# Patient Record
Sex: FEMALE | Race: WHITE | NOT HISPANIC OR LATINO | URBAN - METROPOLITAN AREA
[De-identification: names, ages, dates, MRNs, and addresses within clinical notes are randomized per-mention and may not be internally consistent; named-entity substitution may affect disease eponyms.]

---

## 2022-10-07 ENCOUNTER — INPATIENT (INPATIENT)
Facility: HOSPITAL | Age: 77
LOS: 6 days | Discharge: HOME | DRG: 181 | End: 2022-10-14
Attending: INTERNAL MEDICINE | Admitting: INTERNAL MEDICINE
Payer: MEDICARE

## 2022-10-07 ENCOUNTER — RESULT REVIEW (OUTPATIENT)
Age: 77
End: 2022-10-07

## 2022-10-07 VITALS
TEMPERATURE: 98 F | HEIGHT: 63 IN | RESPIRATION RATE: 22 BRPM | OXYGEN SATURATION: 95 % | HEART RATE: 107 BPM | DIASTOLIC BLOOD PRESSURE: 84 MMHG | SYSTOLIC BLOOD PRESSURE: 162 MMHG | WEIGHT: 123.9 LBS

## 2022-10-07 DIAGNOSIS — J90 PLEURAL EFFUSION, NOT ELSEWHERE CLASSIFIED: ICD-10-CM

## 2022-10-07 LAB
ALBUMIN FLD-MCNC: 3 G/DL — SIGNIFICANT CHANGE UP
ALBUMIN SERPL ELPH-MCNC: 4.5 G/DL — SIGNIFICANT CHANGE UP (ref 3.3–5)
ALP SERPL-CCNC: 67 U/L — SIGNIFICANT CHANGE UP (ref 40–120)
ALT FLD-CCNC: 8 U/L — LOW (ref 10–45)
ANION GAP SERPL CALC-SCNC: 12 MMOL/L — SIGNIFICANT CHANGE UP (ref 5–17)
ANISOCYTOSIS BLD QL: SLIGHT — SIGNIFICANT CHANGE UP
APTT BLD: 31.2 SEC — SIGNIFICANT CHANGE UP (ref 27.5–35.5)
AST SERPL-CCNC: 11 U/L — SIGNIFICANT CHANGE UP (ref 10–40)
B PERT IGG+IGM PNL SER: ABNORMAL
BASE EXCESS BLDV CALC-SCNC: 1.5 MMOL/L — SIGNIFICANT CHANGE UP (ref -2–3)
BASOPHILS # BLD AUTO: 0 K/UL — SIGNIFICANT CHANGE UP (ref 0–0.2)
BASOPHILS NFR BLD AUTO: 0 % — SIGNIFICANT CHANGE UP (ref 0–2)
BILIRUB SERPL-MCNC: 0.7 MG/DL — SIGNIFICANT CHANGE UP (ref 0.2–1.2)
BUN SERPL-MCNC: 18 MG/DL — SIGNIFICANT CHANGE UP (ref 7–23)
BURR CELLS BLD QL SMEAR: SLIGHT — SIGNIFICANT CHANGE UP
CA-I SERPL-SCNC: 1.22 MMOL/L — SIGNIFICANT CHANGE UP (ref 1.15–1.33)
CALCIUM SERPL-MCNC: 9.6 MG/DL — SIGNIFICANT CHANGE UP (ref 8.4–10.5)
CHLORIDE BLDV-SCNC: 105 MMOL/L — SIGNIFICANT CHANGE UP (ref 96–108)
CHLORIDE SERPL-SCNC: 106 MMOL/L — SIGNIFICANT CHANGE UP (ref 96–108)
CO2 BLDV-SCNC: 29 MMOL/L — HIGH (ref 22–26)
CO2 SERPL-SCNC: 25 MMOL/L — SIGNIFICANT CHANGE UP (ref 22–31)
COLOR FLD: YELLOW — SIGNIFICANT CHANGE UP
CREAT SERPL-MCNC: 0.52 MG/DL — SIGNIFICANT CHANGE UP (ref 0.5–1.3)
DACRYOCYTES BLD QL SMEAR: SLIGHT — SIGNIFICANT CHANGE UP
EGFR: 95 ML/MIN/1.73M2 — SIGNIFICANT CHANGE UP
ELLIPTOCYTES BLD QL SMEAR: SLIGHT — SIGNIFICANT CHANGE UP
EOSINOPHIL # BLD AUTO: 0 K/UL — SIGNIFICANT CHANGE UP (ref 0–0.5)
EOSINOPHIL NFR BLD AUTO: 0 % — SIGNIFICANT CHANGE UP (ref 0–6)
FLUID INTAKE SUBSTANCE CLASS: SIGNIFICANT CHANGE UP
GAS PNL BLDV: 139 MMOL/L — SIGNIFICANT CHANGE UP (ref 136–145)
GAS PNL BLDV: SIGNIFICANT CHANGE UP
GAS PNL BLDV: SIGNIFICANT CHANGE UP
GLUCOSE BLDV-MCNC: 101 MG/DL — HIGH (ref 70–99)
GLUCOSE FLD-MCNC: 125 MG/DL — SIGNIFICANT CHANGE UP
GLUCOSE SERPL-MCNC: 104 MG/DL — HIGH (ref 70–99)
HCO3 BLDV-SCNC: 28 MMOL/L — SIGNIFICANT CHANGE UP (ref 22–29)
HCT VFR BLD CALC: 35.8 % — SIGNIFICANT CHANGE UP (ref 34.5–45)
HCT VFR BLDA CALC: 39 % — SIGNIFICANT CHANGE UP (ref 34.5–46.5)
HGB BLD CALC-MCNC: 13 G/DL — SIGNIFICANT CHANGE UP (ref 11.7–16.1)
HGB BLD-MCNC: 10.7 G/DL — LOW (ref 11.5–15.5)
HYPOCHROMIA BLD QL: SLIGHT — SIGNIFICANT CHANGE UP
INR BLD: 1.2 RATIO — HIGH (ref 0.88–1.16)
LACTATE BLDV-MCNC: 2.8 MMOL/L — HIGH (ref 0.5–2)
LDH SERPL L TO P-CCNC: 266 U/L — SIGNIFICANT CHANGE UP
LYMPHOCYTES # BLD AUTO: 1.11 K/UL — SIGNIFICANT CHANGE UP (ref 1–3.3)
LYMPHOCYTES # BLD AUTO: 13.9 % — SIGNIFICANT CHANGE UP (ref 13–44)
LYMPHOCYTES # FLD: 86 % — SIGNIFICANT CHANGE UP
MANUAL SMEAR VERIFICATION: SIGNIFICANT CHANGE UP
MCHC RBC-ENTMCNC: 18.1 PG — LOW (ref 27–34)
MCHC RBC-ENTMCNC: 29.9 GM/DL — LOW (ref 32–36)
MCV RBC AUTO: 60.6 FL — LOW (ref 80–100)
MICROCYTES BLD QL: SLIGHT — SIGNIFICANT CHANGE UP
MONOCYTES # BLD AUTO: 0.28 K/UL — SIGNIFICANT CHANGE UP (ref 0–0.9)
MONOCYTES NFR BLD AUTO: 3.5 % — SIGNIFICANT CHANGE UP (ref 2–14)
MONOS+MACROS # FLD: 12 % — SIGNIFICANT CHANGE UP
NEUTROPHILS # BLD AUTO: 6.51 K/UL — SIGNIFICANT CHANGE UP (ref 1.8–7.4)
NEUTROPHILS NFR BLD AUTO: 81.7 % — HIGH (ref 43–77)
NEUTROPHILS-BODY FLUID: 2 % — SIGNIFICANT CHANGE UP
PCO2 BLDV: 49 MMHG — HIGH (ref 39–42)
PH BLDV: 7.36 — SIGNIFICANT CHANGE UP (ref 7.32–7.43)
PH FLD: 7.69 — SIGNIFICANT CHANGE UP
PLAT MORPH BLD: NORMAL — SIGNIFICANT CHANGE UP
PLATELET # BLD AUTO: 195 K/UL — SIGNIFICANT CHANGE UP (ref 150–400)
PO2 BLDV: 37 MMHG — SIGNIFICANT CHANGE UP (ref 25–45)
POIKILOCYTOSIS BLD QL AUTO: SIGNIFICANT CHANGE UP
POLYCHROMASIA BLD QL SMEAR: SLIGHT — SIGNIFICANT CHANGE UP
POTASSIUM BLDV-SCNC: 3.7 MMOL/L — SIGNIFICANT CHANGE UP (ref 3.5–5.1)
POTASSIUM SERPL-MCNC: 3.9 MMOL/L — SIGNIFICANT CHANGE UP (ref 3.5–5.3)
POTASSIUM SERPL-SCNC: 3.9 MMOL/L — SIGNIFICANT CHANGE UP (ref 3.5–5.3)
PROT FLD-MCNC: 4.5 G/DL — SIGNIFICANT CHANGE UP
PROT SERPL-MCNC: 7.5 G/DL — SIGNIFICANT CHANGE UP (ref 6–8.3)
PROTHROM AB SERPL-ACNC: 14 SEC — HIGH (ref 10.5–13.4)
RBC # BLD: 5.91 M/UL — HIGH (ref 3.8–5.2)
RBC # FLD: 17.4 % — HIGH (ref 10.3–14.5)
RBC BLD AUTO: ABNORMAL
RCV VOL RI: 4000 /UL — HIGH (ref 0–0)
SAO2 % BLDV: 59.2 % — LOW (ref 67–88)
SCHISTOCYTES BLD QL AUTO: SLIGHT — SIGNIFICANT CHANGE UP
SODIUM SERPL-SCNC: 143 MMOL/L — SIGNIFICANT CHANGE UP (ref 135–145)
TARGETS BLD QL SMEAR: SLIGHT — SIGNIFICANT CHANGE UP
TOTAL NUCLEATED CELL COUNT, BODY FLUID: 1156 /UL — SIGNIFICANT CHANGE UP
TUBE TYPE: SIGNIFICANT CHANGE UP
VARIANT LYMPHS # BLD: 0.9 % — SIGNIFICANT CHANGE UP (ref 0–6)
WBC # BLD: 7.97 K/UL — SIGNIFICANT CHANGE UP (ref 3.8–10.5)
WBC # FLD AUTO: 7.97 K/UL — SIGNIFICANT CHANGE UP (ref 3.8–10.5)

## 2022-10-07 PROCEDURE — 88305 TISSUE EXAM BY PATHOLOGIST: CPT | Mod: 26

## 2022-10-07 PROCEDURE — 71260 CT THORAX DX C+: CPT | Mod: 26

## 2022-10-07 PROCEDURE — 88112 CYTOPATH CELL ENHANCE TECH: CPT | Mod: 26

## 2022-10-07 PROCEDURE — 99222 1ST HOSP IP/OBS MODERATE 55: CPT

## 2022-10-07 PROCEDURE — 99285 EMERGENCY DEPT VISIT HI MDM: CPT

## 2022-10-07 PROCEDURE — 71046 X-RAY EXAM CHEST 2 VIEWS: CPT | Mod: 26

## 2022-10-07 PROCEDURE — 71045 X-RAY EXAM CHEST 1 VIEW: CPT | Mod: 26,59

## 2022-10-07 PROCEDURE — 74177 CT ABD & PELVIS W/CONTRAST: CPT | Mod: 26

## 2022-10-07 PROCEDURE — 93010 ELECTROCARDIOGRAM REPORT: CPT

## 2022-10-07 RX ORDER — HYDROMORPHONE HYDROCHLORIDE 2 MG/ML
0.5 INJECTION INTRAMUSCULAR; INTRAVENOUS; SUBCUTANEOUS EVERY 6 HOURS
Refills: 0 | Status: DISCONTINUED | OUTPATIENT
Start: 2022-10-07 | End: 2022-10-09

## 2022-10-07 RX ORDER — SENNA PLUS 8.6 MG/1
2 TABLET ORAL AT BEDTIME
Refills: 0 | Status: DISCONTINUED | OUTPATIENT
Start: 2022-10-07 | End: 2022-10-14

## 2022-10-07 RX ORDER — HEPARIN SODIUM 5000 [USP'U]/ML
5000 INJECTION INTRAVENOUS; SUBCUTANEOUS EVERY 12 HOURS
Refills: 0 | Status: DISCONTINUED | OUTPATIENT
Start: 2022-10-07 | End: 2022-10-12

## 2022-10-07 RX ADMIN — HYDROMORPHONE HYDROCHLORIDE 0.5 MILLIGRAM(S): 2 INJECTION INTRAMUSCULAR; INTRAVENOUS; SUBCUTANEOUS at 21:36

## 2022-10-07 RX ADMIN — HYDROMORPHONE HYDROCHLORIDE 0.5 MILLIGRAM(S): 2 INJECTION INTRAMUSCULAR; INTRAVENOUS; SUBCUTANEOUS at 21:45

## 2022-10-07 NOTE — ED PROVIDER NOTE - CARE PLAN
1 Principal Discharge DX:	Pleural effusion, left   Principal Discharge DX:	Pleural effusion, left  Secondary Diagnosis:	Hydropneumothorax

## 2022-10-07 NOTE — CONSULT NOTE ADULT - SUBJECTIVE AND OBJECTIVE BOX
CHIEF COMPLAINT:Patient is a 77y old  Female who presents with a chief complaint of sob (07 Oct 2022 19:24)      HPI:  77 F no significant PMH presenting to ED with 1 month of SOB and cough. cough was productive now dry. Pt saw PCP 1 week ago for sx and had a CXR ordered which showed increased haziness throughout most of the left mid and lower thorax with small areas of residual aeration upper thorax, clear right lung. Pt has appointment with pulmonologist in 3 weeks but did not want to wait that long so came to ED today. Denies fever, HA, CP, abdominal pain, dysuria, N/V/D. Has not taken any medications for sx.      PAST MEDICAL & SURGICAL HISTORY:  No pertinent past medical history      No significant past surgical history          MEDICATIONS  (STANDING):  heparin   Injectable 5000 Unit(s) SubCutaneous every 12 hours  senna 2 Tablet(s) Oral at bedtime    MEDICATIONS  (PRN):  HYDROmorphone  Injectable 0.5 milliGRAM(s) IV Push every 6 hours PRN Moderate Pain (4 - 6)      FAMILY HISTORY:      SOCIAL HISTORY:    [x ] Non-smoker  [ ] Smoker  [ ] Alcohol    Allergies    No Known Allergies    Intolerances    	    REVIEW OF SYSTEMS:  CONSTITUTIONAL: No fever, weight loss, or fatigue  EYES: No eye pain, visual disturbances, or discharge  ENT:  No difficulty hearing, tinnitus, vertigo; No sinus or throat pain  NECK: No pain or stiffness  RESPIRATORY: No cough, wheezing, chills or hemoptysis; + Shortness of Breath  CARDIOVASCULAR: No chest pain, palpitations, passing out, dizziness, or leg swelling  GASTROINTESTINAL: No abdominal or epigastric pain. No nausea, vomiting, or hematemesis; No diarrhea or constipation. No melena or hematochezia.  GENITOURINARY: No dysuria, frequency, hematuria, or incontinence  NEUROLOGICAL: No headaches, memory loss, loss of strength, numbness, or tremors  SKIN: No itching, burning, rashes, or lesions   LYMPH Nodes: No enlarged glands  ENDOCRINE: No heat or cold intolerance; No hair loss  MUSCULOSKELETAL: No joint pain or swelling; No muscle, back, or extremity pain  PSYCHIATRIC: No depression, anxiety, mood swings, or difficulty sleeping  HEME/LYMPH: No easy bruising, or bleeding gums  ALLERGY AND IMMUNOLOGIC: No hives or eczema	    [ ] All others negative	  [ x] Unable to obtain    PHYSICAL EXAM:  T(C): 36.6 (10-07-22 @ 17:07), Max: 36.7 (10-07-22 @ 10:18)  HR: 88 (10-07-22 @ 17:07) (88 - 107)  BP: 152/76 (10-07-22 @ 17:07) (152/76 - 171/82)  RR: 22 (10-07-22 @ 17:07) (22 - 22)  SpO2: 96% (10-07-22 @ 17:07) (95% - 96%)  Wt(kg): --  I&O's Summary      Appearance: Normal	  HEENT:   Normal oral mucosa, PERRL, EOMI	  Lymphatic: No lymphadenopathy  Cardiovascular: Normal S1 S2, No JVD, + murmurs, No edema  Respiratory: decrease bs R side  Gastrointestinal:  Soft, Non-tender, + BS	  Skin: No rashes, No ecchymoses, No cyanosis	  Neurologic: Non-focal  Extremities: Normal range of motion, No clubbing, cyanosis or edema  Vascular: Peripheral pulses palpable 2+ bilaterally    TELEMETRY: 	    ECG:  	  RADIOLOGY:  OTHER: 	  	  LABS:	 	    CARDIAC MARKERS:                              10.7   7.97  )-----------( 195      ( 07 Oct 2022 14:12 )             35.8     10-07    143  |  106  |  18  ----------------------------<  104<H>  3.9   |  25  |  0.52    Ca    9.6      07 Oct 2022 14:12    TPro  7.5  /  Alb  4.5  /  TBili  0.7  /  DBili  x   /  AST  11  /  ALT  8<L>  /  AlkPhos  67  10-07    proBNP: Serum Pro-Brain Natriuretic Peptide: 194 pg/mL (10-07 @ 14:12)    Lipid Profile:   HgA1c:   TSH:   PT/INR - ( 07 Oct 2022 14:12 )   PT: 14.0 sec;   INR: 1.20 ratio         PTT - ( 07 Oct 2022 14:12 )  PTT:31.2 sec    PREVIOUS DIAGNOSTIC TESTING:    < from: CT Chest w/ IV Cont (10.07.22 @ 16:41) >  Large left hilar and left upper lobe tumor as described with confluent   multistation mediastinal involvement versus lymphadenopathy and moderate   to large loculated left pleural effusion is highly suspicious for small   cell lung carcinoma. Recommend biopsy to confirm diagnosis    Small pericardial effusion.    Heterogeneous mid to distal pancreatic body 4.7 cm mass, presumably   metastatic    Plan/Recommendations:  - Will place L pigtail catheter at bedside  - Will send cytology and cultures from pleural fluid

## 2022-10-07 NOTE — ED PROVIDER NOTE - OBJECTIVE STATEMENT
77 F no significant PMH presenting to ED with 1 month of SOB and cough. cough was productive now dry. Pt saw PCP 1 week ago for sx and had a CXR ordered which showed increased haziness throughout most of the left mid and lower thorax with small areas of residual aeration upper thorax, clear right lung. Pt has appointment with pulmonologist in 3 weeks but did not want to wait that long so came to ED today. Denies fever, HA, CP, abdominal pain, dysuria, N/V/D. Has not taken any medications for sx.

## 2022-10-07 NOTE — CONSULT NOTE ADULT - SUBJECTIVE AND OBJECTIVE BOX
THORACIC SURGERY CONSULT  ========================    HPI:  77F with h/o appendectomy, ex-smoker presenting with cough x1 month. Patient reports a dry cough for one month, as well as some discomfort in her throat. Denies fevers, chill, night sweats. Denies SOB. Unsure about weight loss. Endorses poor appetite. Went to her PCP a week ago who did a CXR and saw a L pleural effusion. Given patient's cough has persisted, she presented to ED for evaluation. Reports she used to smoke about half a pack per day x 20 years but quit a long time ago.    PAST MEDICAL & SURGICAL HISTORY:  No pertinent past medical history    Appendectomy      MEDICATIONS:  None    ALLERGIES:  NKDA  ___________________________________________  REVIEW OF SYSTEMS:  All ROS negative except as per HPI.    ___________________________________________  VITALS:  Vital Signs Last 24 Hrs  T(C): 36.4 (07 Oct 2022 13:02), Max: 36.7 (07 Oct 2022 10:18)  T(F): 97.6 (07 Oct 2022 13:02), Max: 98.1 (07 Oct 2022 10:18)  HR: 92 (07 Oct 2022 13:02) (92 - 107)  BP: 171/82 (07 Oct 2022 13:02) (162/84 - 171/82)  BP(mean): --  RR: 22 (07 Oct 2022 13:02) (22 - 22)  SpO2: 96% (07 Oct 2022 13:02) (95% - 96%)    Parameters below as of 07 Oct 2022 13:02  Patient On (Oxygen Delivery Method): room air      PHYSICAL EXAM:  General: AAOx3, no acute distress.  Respiratory: breathing comfortably, no increased WOB, on room air  Lungs: no chest wall tenderness   Abdomen: soft, nontender, nondistended, no rebound, no guarding  Extremities: Moves all four.   Lymph: no cervical or supraclavicular lymphadenopathy  ____________________________________________  LABS:  CBC Full  -  ( 07 Oct 2022 14:12 )  WBC Count : 7.97 K/uL  RBC Count : 5.91 M/uL  Hemoglobin : 10.7 g/dL  Hematocrit : 35.8 %  Platelet Count - Automated : 195 K/uL  Mean Cell Volume : 60.6 fl  Mean Cell Hemoglobin : 18.1 pg  Mean Cell Hemoglobin Concentration : 29.9 gm/dL  Auto Neutrophil # : 6.51 K/uL  Auto Lymphocyte # : 1.11 K/uL  Auto Monocyte # : 0.28 K/uL  Auto Eosinophil # : 0.00 K/uL  Auto Basophil # : 0.00 K/uL  Auto Neutrophil % : 81.7 %  Auto Lymphocyte % : 13.9 %  Auto Monocyte % : 3.5 %  Auto Eosinophil % : 0.0 %  Auto Basophil % : 0.0 %    10-07    143  |  106  |  18  ----------------------------<  104<H>  3.9   |  25  |  0.52    Ca    9.6      07 Oct 2022 14:12    TPro  7.5  /  Alb  4.5  /  TBili  0.7  /  DBili  x   /  AST  11  /  ALT  8<L>  /  AlkPhos  67  10-07    LIVER FUNCTIONS - ( 07 Oct 2022 14:12 )  Alb: 4.5 g/dL / Pro: 7.5 g/dL / ALK PHOS: 67 U/L / ALT: 8 U/L / AST: 11 U/L / GGT: x           PT/INR - ( 07 Oct 2022 14:12 )   PT: 14.0 sec;   INR: 1.20 ratio      PTT - ( 07 Oct 2022 14:12 )  PTT:31.2 sec    ____________________________________________  RADIOLOGY & ADDITIONAL STUDIES:  < from: CT Chest w/ IV Cont (10.07.22 @ 16:41) >  ACC: [id number] EXAM:  CT CHEST IC                        ACC: [id number] EXAM:  CT ABDOMEN AND PELVIS IC                          PROCEDURE DATE:  10/07/2022      INTERPRETATION:  CLINICAL INFORMATION: Left pleural effusion. Oncologic   workup    COMPARISON: No prior examinations are available for comparison at the   time of this interpretation.    CONTRAST/COMPLICATIONS:  IV Contrast: Omnipaque 350  90 cc administered  Oral Contrast: NONE  Complications: None reported at time of study completion    PROCEDURE:  CT of the Chest, Abdomen and Pelvis was performed.  Sagittal and coronal reformats were performed.    FINDINGS: Some images are degraded by artifacts from the patient's arms   within the scanning field of view. Motion artifacts degrade some of the   imaging.    CHEST:  LUNGS AND LARGE AIRWAYS: Air-fluid level within left mainstem bronchus.  Complete opacification of the left upper lobe and lingular bronchi.  Large approximately 9 cm left hilar tumor extends into the left upper   lobe and is also confluent with the left mediastinum, AP window,   precarinal and subcarinal catrachita bulky lymphadenopathy versus direct   extension of tumor.  Tumor encases and narrows the left pulmonary arteries with encasement of   the left mainstembronchus and is inseparable from the aortic arch   through middescending aorta as well as the lower esophagus.  Postobstructive lingular atelectasis  Mild right basilar subsegmental atelectasis. Calcified granulomata.  PLEURA: Moderate to large loculated left pleural effusion with adjacent   atelectasis. Tumor abuts pleura anteriorly  VESSELS: Atherosclerotic changes of the aorta and coronary arteries.   Dilated ascending aorta measures 3.8 cm. Tumor vascular encasement as   described above  HEART: Heart size is normal. Small pericardial effusion  MEDIASTINUM AND ADE: Bulky multistation mediastinal lymphadenopathy   confluent with the left hilar and left upper lobe tumor. Additional   enlarged 1.7 x 1.2 cm lymph node interposed between the left common   carotid and left subclavian arteries.  CHEST WALL AND LOWER NECK: No significant acute abnormality.    ABDOMEN AND PELVIS:  LIVER: Within normal limits.  BILE DUCTS: Normal caliber.  GALLBLADDER: Nondistended  SPLEEN: Top normal splenic length  PANCREAS: Mid to distal body heterogeneous approximately 4.7 cm mass  ADRENALS: Within normal limits.  KIDNEYS/URETERS: Symmetric renal enhancement without hydronephrosis.   Subcentimeter right upper pole hypodensity too small for definitive   characterization    BLADDER: Minimally distended.  REPRODUCTIVE ORGANS: Atrophic    BOWEL: No bowel obstruction. Appendix is not visualized. No evidence of   inflammation in the pericecal region. Mild colonic diverticulosis  PERITONEUM: No ascites.  VESSELS: Atherosclerotic changes.  RETROPERITONEUM/LYMPH NODES: No lymphadenopathy.  ABDOMINAL WALL: Tiny fat-containing umbilical hernia.  BONES: Multilevel degenerative changes throughout the spine including   age-indeterminate mild L5 compression deformity.    IMPRESSION:    Large left hilar and left upper lobe tumor as described with confluent   multistation mediastinal involvement versus lymphadenopathy and moderate   to large loculated left pleural effusion is highly suspicious for small   cell lung carcinoma. Recommend biopsy to confirm diagnosis    Small pericardial effusion.    Heterogeneous mid to distal pancreatic body 4.7 cm mass, presumably   metastatic    < end of copied text >

## 2022-10-07 NOTE — ED PROVIDER NOTE - NS ED ROS FT
Gen: Denies fevers  CV: Denies chest pain  Skin: denies color changes  Resp: +cough, SOB  Endo: Denies increased urination  GI: Denies nausea, vomiting  Msk: Denies extremity pain  : Denies dysuria  Neuro: Denies LOC  Psych: Denies mood changes  all other ROS negative unless indicated in HPI

## 2022-10-07 NOTE — ED PROVIDER NOTE - CLINICAL SUMMARY MEDICAL DECISION MAKING FREE TEXT BOX
77 F no significant PMH presenting to ED with 1 month of SOB and cough. cough was productive now dry. Pt saw PCP 1 week ago for sx and had a CXR ordered which showed increased haziness throughout most of the left mid and lower thorax with small areas of residual aeration upper thorax, clear right lung. Decreased breath sounds Lt lung on exam. Vitals stable. Sx likely related to pleural effusion. Will repeat CXR, get labs, coags, EKG. likely need drain. Will reassess.

## 2022-10-07 NOTE — H&P ADULT - NSHPREVIEWOFSYSTEMS_GEN_ALL_CORE
REVIEW OF SYSTEMS:  GEN: no fever,    no chills  RESP: SOB, /cough  CVS: no chest pain,   no palpitations  GI: no abdominal pain,   no nausea,   no vomiting,   no constipation,   no diarrhea  : no dysuria,   no frequency  NEURO: no headache,   no dizziness  PSYCH: no depression,   not anxious  Derm : no rash

## 2022-10-07 NOTE — H&P ADULT - HISTORY OF PRESENT ILLNESS
77 F     no significant PMH     presenting to ED with 1 month of SOB and cough.     Pt saw PCP 1 week ago for sx and had a CXR ordered which showed increased haziness throughout most of the left mid and lower thorax with small areas of residual aeration upper thorax, clear right lung. Pt has appointment with pulmonologist in 3 weeks but did not want to wait that long so came to ED today.     Denies fever, cp/ , abdominal pain, dysuria, N/V/D.

## 2022-10-07 NOTE — PROCEDURE NOTE - ADDITIONAL PROCEDURE DETAILS
1000ml serous pleural fluid obtained, chest tube clamped 1000ml serous pleural fluid obtained, chest tube clamped  Cytology and fluid studies sent

## 2022-10-07 NOTE — H&P ADULT - ASSESSMENT
77F     with h/o appendectomy, ex-smoker      presenting with cough x1 month   and mild  sob        found to have a large ELYSE and hilar lung tumor with associated large left pleural effusion.    ct c/a/p, pending   seen by thoracic team    L pigtail catheter at bedside  HTN,  pn toprol,  card/  echo    dvt  ppx   - 77F     with h/o appendectomy, ex-smoker      presenting with cough x1 month   and mild  sob        found to have a large ELYSE and hilar lung tumor with associated large left pleural effusion.    ct c/a/p,  left hilar/ lung mas. .  L  pl  effusion,  mediastinal l adenopathy,  pancreatic  mass small pericardial  effusion,    suspect metastatic lung cancer.  oncology d r ohri   seen by thoracic team    L pigtail catheter at bedside  HTN,  pn toprol,  card/  echo    dvt  ppx   -     ra< from: CT Abdomen and Pelvis w/ IV Cont (10.07.22 @ 16:41) >  IMPRESSION:  Large left hilar and left upper lobe tumor as described with confluent   multistation mediastinal involvement versus lymphadenopathy and moderate   to large loculated left pleural effusion is highly suspicious for small   cell lung carcinoma. Recommend biopsy to confirm diagnosis  Small pericardial effusion.  Heterogeneous mid to distal pancreatic body 4.7 cm mass, presumably   metastati  --- End of Report ---

## 2022-10-07 NOTE — H&P ADULT - NSHPPHYSICALEXAM_GEN_ALL_CORE
PHYSICAL EXAMINATION:  Vital Signs Last 24 Hrs  T(C): 36.4 (07 Oct 2022 13:02), Max: 36.7 (07 Oct 2022 10:18)  T(F): 97.6 (07 Oct 2022 13:02), Max: 98.1 (07 Oct 2022 10:18)  HR: 92 (07 Oct 2022 13:02) (92 - 107)  BP: 171/82 (07 Oct 2022 13:02) (162/84 - 171/82)  BP(mean): --  RR: 22 (07 Oct 2022 13:02) (22 - 22)  SpO2: 96% (07 Oct 2022 13:02) (95% - 96%)    Parameters below as of 07 Oct 2022 13:02  Patient On (Oxygen Delivery Method): room air      CAPILLARY BLOOD GLUCOSE            GENERAL: NAD, well-groomed,  HEAD:  atraumatic, normocephalic  EYES: sclera anicteric  ENMT: mucous membranes moist  NECK: supple, No JVD  CHEST/LUNG:   absent b.soinfs/  base  HEART: normal S1, S2  ABDOMEN: BS+, soft, ND, NT   EXTREMITIES:    no    edema    b/l LEs  NEURO: awake, ,     moves all extremities  SKIN: no     rash

## 2022-10-07 NOTE — ED PROVIDER NOTE - PROGRESS NOTE DETAILS
Leoncio Joseph MD, PGY1  Radiology recommending the CT chest be done with IV contrast as well. Leoncio Joseph MD, PGY1  Xray showing large left sided pleural effusion. Plan to admit to medicine for IR drain. Endorsed to Dr. Melissa Martel. She recommended getting a CT chest and CT abdomen and pelvis to search for source. Discussed with patient. They are in agreement with plan. Answered all questions. Will admit. Leoncio Joseph MD, PGY1  Radiology called, Xray showing hydropneumothorax in addition to Lt pleural effusion. Will call surgery and IR. Leoncio Joseph MD, PGY1  Respoke with surgery, they will see patient. Discussed with admitting Dr. Melissa Martel and updated her on the patient. Pt sating well, no distress, no SOB.

## 2022-10-07 NOTE — H&P ADULT - NSHPLABSRESULTS_GEN_ALL_CORE
LABS:                        10.7   7.97  )-----------( 195      ( 07 Oct 2022 14:12 )             35.8     10-07    143  |  106  |  18  ----------------------------<  104<H>  3.9   |  25  |  0.52    Ca    9.6      07 Oct 2022 14:12    TPro  7.5  /  Alb  4.5  /  TBili  0.7  /  DBili  x   /  AST  11  /  ALT  8<L>  /  AlkPhos  67  10-07    PT/INR - ( 07 Oct 2022 14:12 )   PT: 14.0 sec;   INR: 1.20 ratio         PTT - ( 07 Oct 2022 14:12 )  PTT:31.2 sec            10-07 @ 14:12  3.7  37

## 2022-10-07 NOTE — ED PROVIDER NOTE - CHIEF COMPLAINT
----- Message from Letty Muñoz PA-C sent at 8/8/2022  4:10 PM CDT -----  He did have a short run of V-tach as well as a short run of SVT.  No evidence of atrial fibrillation.  He should follow-up with Dr. Norton, his cardiologist.  
Left message for patient to call back. 8/08/22  
Left message for patient to return our call 08/10/2022.  
Patients wife notified as below. She expressed understanding and agrees.   
The patient is a 77y Female complaining of shortness of breath.

## 2022-10-07 NOTE — ED ADULT NURSE NOTE - OBJECTIVE STATEMENT
Received 77 yr old female patient A&Ox4, complaining of shortness of breath. Patient has even respirations, o2 sat 96%. Patient states that a radiology report showed fluid on her left lung. Patient was made comfortable to area, 20g placed in right A/C. RN will follow up with orders. No chest pain, no nausea, no vomiting, no fever, no chills. Will continue to monitor.

## 2022-10-07 NOTE — ED PROVIDER NOTE - ATTENDING CONTRIBUTION TO CARE
77 F no significant PMH presenting to ED with 1 month of SOB and cough. cough was productive now dry with sandoval for the past month now, outpt cxr with pleural effusion noted, no cp, fevers, orthopnea or pnd, cxr, labs, ekg, sats on ra fine, possible dc but pending results. 77 F no significant PMH presenting to ED with 1 month of SOB and cough. cough was productive now dry with sandoval for the past month now, outpt cxr with pleural effusion noted, no cp, fevers, orthopnea or pnd, cxr, labs, ekg, sats on ra fine but a new finding to r/o cause, possible admission for pigtail CT likely.

## 2022-10-07 NOTE — ED PROVIDER NOTE - PHYSICAL EXAMINATION
Gen: NAD  HEENT: EOMI, no nasal discharge, mucous membranes moist  CV: RRR, +S1/S2, no M/R/G, 2+ radial pulses b/l  Resp: Decreased breath sounds Lt lower and mid lung. Rt lung CTA.   GI: Abdomen soft non-distended, NTTP  MSK: No open wounds, no LE edema  Neuro: A&Ox4, following commands, moving all four extremities spontaneously  Psych: appropriate mood

## 2022-10-08 DIAGNOSIS — J90 PLEURAL EFFUSION, NOT ELSEWHERE CLASSIFIED: ICD-10-CM

## 2022-10-08 LAB
ANION GAP SERPL CALC-SCNC: 11 MMOL/L — SIGNIFICANT CHANGE UP (ref 5–17)
BUN SERPL-MCNC: 14 MG/DL — SIGNIFICANT CHANGE UP (ref 7–23)
CALCIUM SERPL-MCNC: 9.5 MG/DL — SIGNIFICANT CHANGE UP (ref 8.4–10.5)
CHLORIDE SERPL-SCNC: 101 MMOL/L — SIGNIFICANT CHANGE UP (ref 96–108)
CO2 SERPL-SCNC: 26 MMOL/L — SIGNIFICANT CHANGE UP (ref 22–31)
CREAT SERPL-MCNC: 0.45 MG/DL — LOW (ref 0.5–1.3)
EGFR: 98 ML/MIN/1.73M2 — SIGNIFICANT CHANGE UP
GLUCOSE SERPL-MCNC: 119 MG/DL — HIGH (ref 70–99)
GRAM STN FLD: SIGNIFICANT CHANGE UP
HCT VFR BLD CALC: 35.7 % — SIGNIFICANT CHANGE UP (ref 34.5–45)
HGB BLD-MCNC: 11 G/DL — LOW (ref 11.5–15.5)
MCHC RBC-ENTMCNC: 18.3 PG — LOW (ref 27–34)
MCHC RBC-ENTMCNC: 30.8 GM/DL — LOW (ref 32–36)
MCV RBC AUTO: 59.4 FL — LOW (ref 80–100)
NRBC # BLD: 0 /100 WBCS — SIGNIFICANT CHANGE UP (ref 0–0)
PLATELET # BLD AUTO: 194 K/UL — SIGNIFICANT CHANGE UP (ref 150–400)
POTASSIUM SERPL-MCNC: 3.9 MMOL/L — SIGNIFICANT CHANGE UP (ref 3.5–5.3)
POTASSIUM SERPL-SCNC: 3.9 MMOL/L — SIGNIFICANT CHANGE UP (ref 3.5–5.3)
RBC # BLD: 6.01 M/UL — HIGH (ref 3.8–5.2)
RBC # FLD: 17.2 % — HIGH (ref 10.3–14.5)
SARS-COV-2 RNA SPEC QL NAA+PROBE: SIGNIFICANT CHANGE UP
SODIUM SERPL-SCNC: 138 MMOL/L — SIGNIFICANT CHANGE UP (ref 135–145)
SPECIMEN SOURCE: SIGNIFICANT CHANGE UP
WBC # BLD: 8.84 K/UL — SIGNIFICANT CHANGE UP (ref 3.8–10.5)
WBC # FLD AUTO: 8.84 K/UL — SIGNIFICANT CHANGE UP (ref 3.8–10.5)

## 2022-10-08 PROCEDURE — 71045 X-RAY EXAM CHEST 1 VIEW: CPT | Mod: 26

## 2022-10-08 PROCEDURE — 99231 SBSQ HOSP IP/OBS SF/LOW 25: CPT

## 2022-10-08 PROCEDURE — 99232 SBSQ HOSP IP/OBS MODERATE 35: CPT | Mod: 57

## 2022-10-08 RX ORDER — METOPROLOL TARTRATE 50 MG
25 TABLET ORAL DAILY
Refills: 0 | Status: DISCONTINUED | OUTPATIENT
Start: 2022-10-08 | End: 2022-10-14

## 2022-10-08 RX ORDER — AMLODIPINE BESYLATE 2.5 MG/1
5 TABLET ORAL DAILY
Refills: 0 | Status: DISCONTINUED | OUTPATIENT
Start: 2022-10-08 | End: 2022-10-14

## 2022-10-08 RX ADMIN — Medication 25 MILLIGRAM(S): at 09:56

## 2022-10-08 RX ADMIN — SENNA PLUS 2 TABLET(S): 8.6 TABLET ORAL at 21:47

## 2022-10-08 RX ADMIN — HEPARIN SODIUM 5000 UNIT(S): 5000 INJECTION INTRAVENOUS; SUBCUTANEOUS at 05:53

## 2022-10-08 RX ADMIN — HEPARIN SODIUM 5000 UNIT(S): 5000 INJECTION INTRAVENOUS; SUBCUTANEOUS at 17:44

## 2022-10-08 NOTE — PROGRESS NOTE ADULT - ASSESSMENT
77F     with h/o appendectomy, ex-smoker      presenting with cough x1 month   and mild  sob        found to have a large ELYSE and hilar lung tumor with associated large left pleural effusion.    ct c/a/p,  left hilar/ lung mas.   L  pl  effusion,  mediastinal l adenopathy,  pancreatic  mass small pericardial  effusion,    suspect metastatic lung cancer.  oncology d r ohri     thoracic team  and Left chest tube  on 10/ 7,  follow  cytology  HTN, on toprol     dvt  ppx        ra< from: CT Abdomen and Pelvis w/ IV Cont (10.07.22 @ 16:41) >  IMPRESSION:  Large left hilar and left upper lobe tumor as described with confluent   multistation mediastinal involvement versus lymphadenopathy and moderate   to large loculated left pleural effusion is highly suspicious for small   cell lung carcinoma. Recommend biopsy to confirm diagnosis  Small pericardial effusion.  Heterogeneous mid to distal pancreatic body 4.7 cm mass, presumably   metastati  --- End of Report ---

## 2022-10-08 NOTE — PATIENT PROFILE ADULT - FALL HARM RISK - HARM RISK INTERVENTIONS

## 2022-10-08 NOTE — PROGRESS NOTE ADULT - PROBLEM SELECTOR PLAN 1
keep Lt PTC   Pleurvac  lws   follow up cyto and pl cx  OOB to chair   \  recommend MRI head to r/o mass

## 2022-10-08 NOTE — PATIENT PROFILE ADULT - FALL HARM RISK - FACTORS NURSING JUDGEMENT
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  She was seen in the clinic yesterday with complaints of abdominal pain poor appetite poor oral intake and was clearly volume depleted.  There were no distinct masses that were palpated in the abdominal cavity.  And is a consequence of pain and dehydration she was admitted.  Over the course of the evening she was started on IV fluids.  She continues to complain of abdominal pain today.  2019 continues to complain of abdominal pain the Ca1 25 is trivially elevated and certainly not in keeping with an epithelial ovarian malignancy but it is the sem[place].  Agree with surgical approach on Tuesday.  We will obtain a cardiology consult for clearance.  2019: Patient feeling well this AM.  2019: Patient had congestion this morning.  Feeling that she is wheezing a bit.  Sydnee 10, 2019: Feels okay.  Expressing some anxiety about surgery tomorrow.  No S OB or BAUER.  2019: Somewhat apprehensive about surgery today has a cough that is nonproductive.  2019: Status post ISMAEL/BSO and resection of a rectus mass plan.  Clinically lymphoma and not an epithelial ovarian malignancy.  Lethargic but arousable.  Answers questions appropriately..       Physical Examination   VS/Measurements     Vitals  between:   11-JUN-2019 09:26:15   TO   12-JUN-2019 09:26:15                   LAST RESULT MINIMUM MAXIMUM  Temperature 35.9 35.2 36.1  Heart Rate 85 77 86  Respiratory Rate 16 14 19  NISBP           126 126 160  NIDBP           64 64 86  NIMBP           85 85 101  A Line Systolic 176 167 176  A Line Diastolic 95 92 95  A Line Mean 122 117 122  SpO2                    99 95 100    Intake and Output   I&O 24 hr   I & O between:  11-JUN-2019 09:26 TO 12-JUN-2019 09:26  Med Dosing Weight:  81.2  kg   05-JUN-2019  24 Hour Intake:   3558.83  ( 43.83 mL/kg )  24 Hour Output:   2250.00           24 Hour Urine/Stool Output:   0.0  24 Hour Balance:   1308.83           24 Hour Urine Output:   1750.00  ( 0.90 mL/kg/hr )    General:  Alert and oriented.    HENT:  Oral mucosa is moist.    Neck:  Supple, No lymphadenopathy.    Respiratory:  Rales in both bases.    Cardiovascular:  No murmur, No gallop.    Gastrointestinal:  Soft, Non-tender, Normal bowel sounds.    Lymphatics:  No lymphadenopathy neck, axilla, groin.    Musculoskeletal:  Normal range of motion.    Integumentary:  Warm.    Neurologic:  Alert.    Cognition and Speech:  Oriented.      Review / Management   Laboratory results:     Labs between:  11-JUN-2019 09:26 to 12-JUN-2019 09:26  CBC:                 WBC  HgB  Hct  Plt  MCV  RDW   12-JUN-2019 (H) 12.8  (L) 10.5  (L) 33.3  150  98.2  14.4   DIFF:                 Seg  Neutroph//ABS  Lymph//ABS  Mono//ABS  EOS/ABS  12-JUN-2019 NOT APPLICABLE  89 // (H) 11.3  3 // (L) 0.4  7 // 0.9 0 // (L) 0.0  BMP:                 Na  Cl  BUN  Glu   12-JUN-2019 143  (H) 108  17  (H) 105                              K  CO2  Cr  Ca                              4.5  24  (H) 1.14  9.0   CMP:                 AST  ALT  AlkPhos  Bili  Albumin   12-JUN-2019 (H) 43  18  (H) 139  0.3  (L) 2.8   Other Chem:             Mg  Phos  Triglycerides  GGTP  DirectBili                           1.9  (H) 6.2                        .    Documentation  reviewed     Impression and Plan   Dx and Plan:  Diagnosis     Acute kidney injury POA  Mild volume overload  Right ovarian mass possibly marginal zone lymphoma versus an epithelial ovarian malignancy  Marginal zone lymphoma in relapse  History of DCIS  Strong family history of breast cancer but she is not a BRCA1 or 2 carrier.  However investigation for check2 and PAL B2 have not been performed  Agammaglobulinemia as a consequence of Rituxan  Plan await final pathology.     .   Yes

## 2022-10-08 NOTE — PATIENT PROFILE ADULT - DOMESTIC TRAVEL HIGH RISK QUESTION
No Intermediate Repair Preamble Text (Leave Blank If You Do Not Want): Undermining was performed with blunt dissection.

## 2022-10-08 NOTE — PROGRESS NOTE ADULT - ASSESSMENT
77F with h/o appendectomy, ex-smoker presenting with cough x1 month found to have a large ELYSE and hilar lung tumor with associated large left pleural effusion. Currently hemodynamically stable and on room air.     L pigtail catheter at bedside   cytology and cultures from pleural flui  10/8   lt ptc   draining  lws

## 2022-10-08 NOTE — PROGRESS NOTE ADULT - SUBJECTIVE AND OBJECTIVE BOX
[facility]  REVIEW OF SYSTEMS:  GEN: no fever,    no chills  RESP: no SOB,   no cough  CVS: no chest pain,   no palpitations  GI: no abdominal pain,   no nausea,   no vomiting,   no constipation,   no diarrhea  : no dysuria,   no frequency  NEURO: no headache,   no dizziness  PSYCH: no depression,   not anxious  Derm : no rash    MEDICATIONS  (STANDING):  heparin   Injectable 5000 Unit(s) SubCutaneous every 12 hours  senna 2 Tablet(s) Oral at bedtime    MEDICATIONS  (PRN):  HYDROmorphone  Injectable 0.5 milliGRAM(s) IV Push every 6 hours PRN Moderate Pain (4 - 6)      Vital Signs Last 24 Hrs  T(C): 37.1 (08 Oct 2022 04:54), Max: 37.1 (08 Oct 2022 04:15)  T(F): 98.7 (08 Oct 2022 04:54), Max: 98.7 (08 Oct 2022 04:15)  HR: 95 (08 Oct 2022 04:54) (88 - 107)  BP: 163/78 (08 Oct 2022 04:54) (152/76 - 172/80)  BP(mean): --  RR: 17 (08 Oct 2022 04:54) (17 - 22)  SpO2: 95% (08 Oct 2022 04:54) (94% - 96%)    Parameters below as of 08 Oct 2022 04:54  Patient On (Oxygen Delivery Method): room air      CAPILLARY BLOOD GLUCOSE        I&O's Summary      PHYSICAL EXAM:  HEAD:  Atraumatic, Normocephalic  NECK: Supple, No   JVD  CHEST/LUNG:   no     rales,     no,    rhonchi  HEART: Regular rate and rhythm;         murmur  ABDOMEN: Soft, Nontender, ;   EXTREMITIES:    no    edema  NEUROLOGY:  alert    LABS:                        10.7   7.97  )-----------( 195      ( 07 Oct 2022 14:12 )             35.8     10-07    143  |  106  |  18  ----------------------------<  104<H>  3.9   |  25  |  0.52    Ca    9.6      07 Oct 2022 14:12    TPro  7.5  /  Alb  4.5  /  TBili  0.7  /  DBili  x   /  AST  11  /  ALT  8<L>  /  AlkPhos  67  10-07    PT/INR - ( 07 Oct 2022 14:12 )   PT: 14.0 sec;   INR: 1.20 ratio         PTT - ( 07 Oct 2022 14:12 )  PTT:31.2 sec            10-07 @ 14:12  3.7  37              Consultant(s) Notes Reviewed:      Care Discussed with Consultants/Other Providers:

## 2022-10-08 NOTE — PROGRESS NOTE ADULT - SUBJECTIVE AND OBJECTIVE BOX
CARDIOLOGY     PROGRESS  NOTE   ________________________________________________    CHIEF COMPLAINT:Patient is a 77y old  Female who presents with a chief complaint of lt pl eff (08 Oct 2022 11:00)  no complain.  	  REVIEW OF SYSTEMS:  CONSTITUTIONAL: No fever, weight loss, or fatigue  EYES: No eye pain, visual disturbances, or discharge  ENT:  No difficulty hearing, tinnitus, vertigo; No sinus or throat pain  NECK: No pain or stiffness  RESPIRATORY: No cough, wheezing, chills or hemoptysis; +Shortness of Breath  CARDIOVASCULAR: No chest pain, palpitations, passing out, dizziness, or leg swelling  GASTROINTESTINAL: No abdominal or epigastric pain. No nausea, vomiting, or hematemesis; No diarrhea or constipation. No melena or hematochezia.  GENITOURINARY: No dysuria, frequency, hematuria, or incontinence  NEUROLOGICAL: No headaches, memory loss, loss of strength, numbness, or tremors  SKIN: No itching, burning, rashes, or lesions   LYMPH Nodes: No enlarged glands  ENDOCRINE: No heat or cold intolerance; No hair loss  MUSCULOSKELETAL: No joint pain or swelling; No muscle, back, or extremity pain  PSYCHIATRIC: No depression, anxiety, mood swings, or difficulty sleeping  HEME/LYMPH: No easy bruising, or bleeding gums  ALLERGY AND IMMUNOLOGIC: No hives or eczema	    [ ] All others negative	  [ ] Unable to obtain    PHYSICAL EXAM:  T(C): 36.6 (10-08-22 @ 09:49), Max: 37.1 (10-08-22 @ 04:15)  HR: 100 (10-08-22 @ 09:49) (88 - 100)  BP: 186/67 (10-08-22 @ 09:49) (152/76 - 186/67)  RR: 17 (10-08-22 @ 09:49) (17 - 22)  SpO2: 95% (10-08-22 @ 09:49) (94% - 96%)  Wt(kg): --  I&O's Summary    07 Oct 2022 07:01  -  08 Oct 2022 07:00  --------------------------------------------------------  IN: 200 mL / OUT: 1300 mL / NET: -1100 mL    08 Oct 2022 07:01  -  08 Oct 2022 11:24  --------------------------------------------------------  IN: 0 mL / OUT: 40 mL / NET: -40 mL        Appearance: Normal	  HEENT:   Normal oral mucosa, PERRL, EOMI	  Lymphatic: No lymphadenopathy  Cardiovascular: Normal S1 S2, No JVD, +murmurs, No edema  Respiratory: rhonchi/ chest tube  Gastrointestinal:  Soft, Non-tender, + BS	  Skin: No rashes, No ecchymoses, No cyanosis	  Neurologic: Non-focal  Extremities: Normal range of motion, No clubbing, cyanosis or edema  Vascular: Peripheral pulses palpable 2+ bilaterally    MEDICATIONS  (STANDING):  heparin   Injectable 5000 Unit(s) SubCutaneous every 12 hours  metoprolol succinate ER 25 milliGRAM(s) Oral daily  senna 2 Tablet(s) Oral at bedtime      TELEMETRY: 	    ECG:  	  RADIOLOGY:  OTHER: 	  	  LABS:	 	    CARDIAC MARKERS:                                11.0   8.84  )-----------( 194      ( 08 Oct 2022 09:58 )             35.7     10-08    138  |  101  |  14  ----------------------------<  119<H>  3.9   |  26  |  0.45<L>    Ca    9.5      08 Oct 2022 09:58    TPro  7.5  /  Alb  4.5  /  TBili  0.7  /  DBili  x   /  AST  11  /  ALT  8<L>  /  AlkPhos  67  10-07    proBNP: Serum Pro-Brain Natriuretic Peptide: 194 pg/mL (10-07 @ 14:12)    Lipid Profile:   HgA1c:   TSH:   PT/INR - ( 07 Oct 2022 14:12 )   PT: 14.0 sec;   INR: 1.20 ratio         PTT - ( 07 Oct 2022 14:12 )  PTT:31.2 sec    < from: CT Chest w/ IV Cont (10.07.22 @ 16:41) >  Large left hilar and left upper lobe tumor as described with confluent   multistation mediastinal involvement versus lymphadenopathy and moderate   to large loculated left pleural effusion is highly suspicious for small   cell lung carcinoma. Recommend biopsy to confirm diagnosis    Small pericardial effusion.    Heterogeneous mid to distal pancreatic body 4.7 cm mass, presumably   metastatic      Assessment and plan  ---------------------------  77 F no significant PMH presenting to ED with 1 month of SOB and cough. cough was productive now dry. Pt saw PCP 1 week ago for sx and had a CXR ordered which showed increased haziness throughout most of the left mid and lower thorax with small areas of residual aeration upper thorax, clear right lung. Pt has appointment with pulmonologist in 3 weeks but did not want to wait that long so came to ED today. Denies fever, HA, CP, abdominal pain, dysuria, N/V/D. Has not taken any medications for sx.  pt with sob and large pleural effusion ?sec to metastatic ca  s/p chest tube/ pulmonary/ thoracic eval and follow up  dvt prophylaxis  biopsy  onc eval/palliative care  htn will adjust meds, hold bp meds for now  increasing beta blocker  will adjust bp med  echo

## 2022-10-09 PROCEDURE — 99232 SBSQ HOSP IP/OBS MODERATE 35: CPT | Mod: 57

## 2022-10-09 PROCEDURE — 99231 SBSQ HOSP IP/OBS SF/LOW 25: CPT

## 2022-10-09 PROCEDURE — 71045 X-RAY EXAM CHEST 1 VIEW: CPT | Mod: 26

## 2022-10-09 RX ORDER — ACETAMINOPHEN 500 MG
650 TABLET ORAL EVERY 6 HOURS
Refills: 0 | Status: DISCONTINUED | OUTPATIENT
Start: 2022-10-09 | End: 2022-10-14

## 2022-10-09 RX ADMIN — HEPARIN SODIUM 5000 UNIT(S): 5000 INJECTION INTRAVENOUS; SUBCUTANEOUS at 17:57

## 2022-10-09 RX ADMIN — Medication 650 MILLIGRAM(S): at 16:19

## 2022-10-09 RX ADMIN — Medication 650 MILLIGRAM(S): at 19:57

## 2022-10-09 RX ADMIN — HYDROMORPHONE HYDROCHLORIDE 0.5 MILLIGRAM(S): 2 INJECTION INTRAMUSCULAR; INTRAVENOUS; SUBCUTANEOUS at 02:00

## 2022-10-09 RX ADMIN — AMLODIPINE BESYLATE 5 MILLIGRAM(S): 2.5 TABLET ORAL at 06:41

## 2022-10-09 RX ADMIN — Medication 25 MILLIGRAM(S): at 06:42

## 2022-10-09 RX ADMIN — Medication 650 MILLIGRAM(S): at 20:57

## 2022-10-09 RX ADMIN — HEPARIN SODIUM 5000 UNIT(S): 5000 INJECTION INTRAVENOUS; SUBCUTANEOUS at 06:42

## 2022-10-09 RX ADMIN — Medication 650 MILLIGRAM(S): at 13:32

## 2022-10-09 RX ADMIN — HYDROMORPHONE HYDROCHLORIDE 0.5 MILLIGRAM(S): 2 INJECTION INTRAMUSCULAR; INTRAVENOUS; SUBCUTANEOUS at 01:33

## 2022-10-09 NOTE — PROGRESS NOTE ADULT - SUBJECTIVE AND OBJECTIVE BOX
VITAL SIGNS        Vital Signs Last 24 Hrs  T(C): 36.7 (10-09-22 @ 08:53), Max: 36.7 (10-08-22 @ 21:18)  T(F): 98 (10-09-22 @ 08:53), Max: 98.1 (10-08-22 @ 21:18)  HR: 83 (10-09-22 @ 08:53) (83 - 92)  BP: 136/60 (10-09-22 @ 08:53) (129/68 - 159/70)  RR: 18 (10-09-22 @ 08:53) (17 - 18)  SpO2: 94% (10-09-22 @ 08:53) (94% - 98%)                   Daily     Daily         CAPILLARY BLOOD GLUCOSE              Drains:    rt pl chest tube                      PHYSICAL EXAM  s"  Neurology: alert and oriented x 3, moves all extremities with no defecits  CV :  RRR    Lungs:   CTA B/L  Abdomen: soft, nontender, nondistended, positive bowel sounds, last bowel movement   Extremities:                                            VITAL SIGNS        Vital Signs Last 24 Hrs  T(C): 36.7 (10-09-22 @ 08:53), Max: 36.7 (10-08-22 @ 21:18)  T(F): 98 (10-09-22 @ 08:53), Max: 98.1 (10-08-22 @ 21:18)  HR: 83 (10-09-22 @ 08:53) (83 - 92)  BP: 136/60 (10-09-22 @ 08:53) (129/68 - 159/70)  RR: 18 (10-09-22 @ 08:53) (17 - 18)  SpO2: 94% (10-09-22 @ 08:53) (94% - 98%)                   Daily     Daily         CAPILLARY BLOOD GLUCOSE              Drains:    rt pl chest tube                      PHYSICAL EXAM  s" no sob  no chest pain"  Neurology: alert and oriented x 3, moves all extremities with no defecits  CV :  RRR    Lungs:   lt side diminshed  Abdomen: soft, nontender, nondistended, positive bowel sounds,   Extremities:     no edema

## 2022-10-09 NOTE — PROGRESS NOTE ADULT - PROBLEM SELECTOR PLAN 1
keep Lt PTC   Pleurvac  lws   follow up cyto and pl cx  OOB to chair   \  recommend MRI head to r/o mass keep Lt PTC   Pleurvac  lws   follow up cyto and pl cx  OOB to chair   \  for all meals to assist in drainage  recommend MRI head to r/o mass

## 2022-10-09 NOTE — PROGRESS NOTE ADULT - ASSESSMENT
77F with h/o appendectomy, ex-smoker presenting with cough x1 month found to have a large ELYSE and hilar lung tumor with associated large left pleural effusion. Currently hemodynamically stable and on room air.     L pigtail catheter at bedside   cytology and cultures from pleural flui  10/8   lt ptc   draining  lws 77F with h/o appendectomy, ex-smoker presenting with cough x1 month found to have a large ELYSE and hilar lung tumor with associated large left pleural effusion. Currently hemodynamically stable and on room air.     L pigtail catheter at bedside   cytology and cultures from pleural flui  10/8   lt ptc   draining  lws  ambulated

## 2022-10-09 NOTE — PHYSICAL THERAPY INITIAL EVALUATION ADULT - GENERAL OBSERVATIONS, REHAB EVAL
Pt presents with cough, found to have hilar lung tumor and large pleural effusion, now s/p L pigtail catheter insertion.

## 2022-10-09 NOTE — PHYSICAL THERAPY INITIAL EVALUATION ADULT - PERTINENT HX OF CURRENT PROBLEM, REHAB EVAL
77F with h/o appendectomy, ex-smoker presenting with cough x1 month found to have a large ELYSE and hilar lung tumor with associated large left pleural effusion. Currently hemodynamically stable and on room air.

## 2022-10-09 NOTE — PROGRESS NOTE ADULT - SUBJECTIVE AND OBJECTIVE BOX
CARDIOLOGY     PROGRESS  NOTE   ________________________________________________    CHIEF COMPLAINT:Patient is a 77y old  Female who presents with a chief complaint of lt pl effusion (09 Oct 2022 11:19)  no complain.  	  REVIEW OF SYSTEMS:  CONSTITUTIONAL: No fever, weight loss, or fatigue  EYES: No eye pain, visual disturbances, or discharge  ENT:  No difficulty hearing, tinnitus, vertigo; No sinus or throat pain  NECK: No pain or stiffness  RESPIRATORY: No cough, wheezing, chills or hemoptysis; No Shortness of Breath  CARDIOVASCULAR: No chest pain, palpitations, passing out, dizziness, or leg swelling  GASTROINTESTINAL: No abdominal or epigastric pain. No nausea, vomiting, or hematemesis; No diarrhea or constipation. No melena or hematochezia.  GENITOURINARY: No dysuria, frequency, hematuria, or incontinence  NEUROLOGICAL: No headaches, memory loss, loss of strength, numbness, or tremors  SKIN: No itching, burning, rashes, or lesions   LYMPH Nodes: No enlarged glands  ENDOCRINE: No heat or cold intolerance; No hair loss  MUSCULOSKELETAL: No joint pain or swelling; No muscle, back, or extremity pain  PSYCHIATRIC: No depression, anxiety, mood swings, or difficulty sleeping  HEME/LYMPH: No easy bruising, or bleeding gums  ALLERGY AND IMMUNOLOGIC: No hives or eczema	    [x ] All others negative	  [ ] Unable to obtain    PHYSICAL EXAM:  T(C): 36.7 (10-09-22 @ 08:53), Max: 36.7 (10-08-22 @ 21:18)  HR: 83 (10-09-22 @ 08:53) (83 - 92)  BP: 136/60 (10-09-22 @ 08:53) (129/68 - 159/70)  RR: 18 (10-09-22 @ 08:53) (17 - 18)  SpO2: 94% (10-09-22 @ 08:53) (94% - 98%)  Wt(kg): --  I&O's Summary    08 Oct 2022 07:01  -  09 Oct 2022 07:00  --------------------------------------------------------  IN: 180 mL / OUT: 170 mL / NET: 10 mL        Appearance: Normal	  HEENT:   Normal oral mucosa, PERRL, EOMI	  Lymphatic: No lymphadenopathy  Cardiovascular: Normal S1 S2, No JVD, + murmurs, No edema  Respiratory: decrease r sided bs/ chest tube  Psychiatry: A & O x 3, Mood & affect appropriate  Gastrointestinal:  Soft, Non-tender, + BS	  Skin: No rashes, No ecchymoses, No cyanosis	  Neurologic: Non-focal  Extremities: Normal range of motion, No clubbing, cyanosis or edema  Vascular: Peripheral pulses palpable 2+ bilaterally    MEDICATIONS  (STANDING):  amLODIPine   Tablet 5 milliGRAM(s) Oral daily  heparin   Injectable 5000 Unit(s) SubCutaneous every 12 hours  metoprolol succinate ER 25 milliGRAM(s) Oral daily  senna 2 Tablet(s) Oral at bedtime      TELEMETRY: 	    ECG:  	  RADIOLOGY:  OTHER: 	  	  LABS:	 	    CARDIAC MARKERS:                                11.0   8.84  )-----------( 194      ( 08 Oct 2022 09:58 )             35.7     10-08    138  |  101  |  14  ----------------------------<  119<H>  3.9   |  26  |  0.45<L>    Ca    9.5      08 Oct 2022 09:58    TPro  7.5  /  Alb  4.5  /  TBili  0.7  /  DBili  x   /  AST  11  /  ALT  8<L>  /  AlkPhos  67  10-07    proBNP: Serum Pro-Brain Natriuretic Peptide: 194 pg/mL (10-07 @ 14:12)    Lipid Profile:   HgA1c:   TSH:   PT/INR - ( 07 Oct 2022 14:12 )   PT: 14.0 sec;   INR: 1.20 ratio         PTT - ( 07 Oct 2022 14:12 )  PTT:31.2 sec    ·  Problem: Pleural effusion, left.   ·  Plan: keep Lt PTC   Pleurvac  lws   follow up cyto and pl cx  OOB to chair    for all meals to assist in drainage  recommend MRI head to r/o mass.  IMPRESSION:  Large left hilar and left upper lobe tumor as described with confluent   multistation mediastinal involvement versus lymphadenopathy and moderate   to large loculated left pleural effusion is highly suspicious for small   cell lung carcinoma. Recommend biopsy to confirm diagnosis  Small pericardial effusion.  Heterogeneous mid to distal pancreatic body 4.7 cm mass, presumably   metastati    Assessment and plan  ---------------------------  77 F no significant PMH presenting to ED with 1 month of SOB and cough. cough was productive now dry. Pt saw PCP 1 week ago for sx and had a CXR ordered which showed increased haziness throughout most of the left mid and lower thorax with small areas of residual aeration upper thorax, clear right lung. Pt has appointment with pulmonologist in 3 weeks but did not want to wait that long so came to ED today. Denies fever, HA, CP, abdominal pain, dysuria, N/V/D. Has not taken any medications for sx.  pt with sob and large pleural effusion ?sec to metastatic ca  s/p chest tube/ pulmonary/ thoracic eval and follow up  dvt prophylaxis  biopsy  htn will adjust meds, hold bp meds for now  increasing beta blocker  brain MRI r/o mets  will adjust bp med  echo  thoracis surgery appreciated

## 2022-10-09 NOTE — PROGRESS NOTE ADULT - SUBJECTIVE AND OBJECTIVE BOX
afberile    REVIEW OF SYSTEMS:  GEN: no fever,    no chills  RESP: no SOB,   no cough  CVS: no chest pain,   no palpitations  GI: no abdominal pain,   no nausea,   no vomiting,   no constipation,   no diarrhea  : no dysuria,   no frequency  NEURO: no headache,   no dizziness  PSYCH: no depression,   not anxious  Derm : no rash    MEDICATIONS  (STANDING):  amLODIPine   Tablet 5 milliGRAM(s) Oral daily  heparin   Injectable 5000 Unit(s) SubCutaneous every 12 hours  metoprolol succinate ER 25 milliGRAM(s) Oral daily  senna 2 Tablet(s) Oral at bedtime    MEDICATIONS  (PRN):  acetaminophen     Tablet .. 650 milliGRAM(s) Oral every 6 hours PRN Mild Pain (1 - 3), Moderate Pain (4 - 6)  HYDROmorphone  Injectable 0.5 milliGRAM(s) IV Push every 6 hours PRN Moderate Pain (4 - 6)      Vital Signs Last 24 Hrs  T(C): 36.6 (09 Oct 2022 00:02), Max: 36.7 (08 Oct 2022 21:18)  T(F): 97.9 (09 Oct 2022 00:02), Max: 98.1 (08 Oct 2022 21:18)  HR: 88 (09 Oct 2022 00:02) (83 - 100)  BP: 150/73 (09 Oct 2022 00:02) (129/68 - 186/67)  BP(mean): --  RR: 17 (09 Oct 2022 00:02) (17 - 17)  SpO2: 98% (09 Oct 2022 00:02) (95% - 98%)    Parameters below as of 09 Oct 2022 00:02  Patient On (Oxygen Delivery Method): room air      CAPILLARY BLOOD GLUCOSE        I&O's Summary    08 Oct 2022 07:01  -  09 Oct 2022 07:00  --------------------------------------------------------  IN: 180 mL / OUT: 170 mL / NET: 10 mL        PHYSICAL EXAM:  HEAD:  Atraumatic, Normocephalic  NECK: Supple, No   JVD  CHEST/LUNG:   no     rales,     no,    rhonchi  HEART: Regular rate and rhythm;         murmur  ABDOMEN: Soft, Nontender, ;   EXTREMITIES:   no     edema  NEUROLOGY:  alert    LABS:                        11.0   8.84  )-----------( 194      ( 08 Oct 2022 09:58 )             35.7     10-08    138  |  101  |  14  ----------------------------<  119<H>  3.9   |  26  |  0.45<L>    Ca    9.5      08 Oct 2022 09:58    TPro  7.5  /  Alb  4.5  /  TBili  0.7  /  DBili  x   /  AST  11  /  ALT  8<L>  /  AlkPhos  67  10-07    PT/INR - ( 07 Oct 2022 14:12 )   PT: 14.0 sec;   INR: 1.20 ratio         PTT - ( 07 Oct 2022 14:12 )  PTT:31.2 sec            10-07 @ 14:12  3.7  37              Consultant(s) Notes Reviewed:      Care Discussed with Consultants/Other Providers:

## 2022-10-09 NOTE — PHYSICAL THERAPY INITIAL EVALUATION ADULT - REFERRING PHYSICIAN, REHAB EVAL
Pt presents with cough, found to have hilar lung tumor and large pleural effusion, now s/p L pigtail catheter insertion. Pt received semi-supine in bed in NAD, VSS, +L pigtail catheter, A&Ox4, agreeable to PT.

## 2022-10-09 NOTE — CHART NOTE - NSCHARTNOTEFT_GEN_A_CORE
Advanced Care Planning:  I have had goals of care discussion, advanced directive and code status with patient/family    and  in  coordinating   patient care.     all questions answered and expressed understanding of the plan.   pt is  full code

## 2022-10-09 NOTE — PROGRESS NOTE ADULT - ASSESSMENT
77F     with h/o appendectomy, ex-smoker      presenting with cough x1 month   and mild  sob        found to have a large ELYSE and hilar lung tumor with associated large left pleural effusion.    ct c/a/p,  left hilar/ lung mas.   L  pl  effusion,  mediastinal l adenopathy,  pancreatic  mass small pericardial  effusion,    suspect metastatic lung cancer.  oncology d r ohri     thoracic team  and Left chest tube  on 10/ 7,  follow  cytology  HTN, on toprol   cxr., no  pneumothorax     await  biopsy  on dvt  ppx       ra< from: CT Abdomen and Pelvis w/ IV Cont (10.07.22 @ 16:41) >  IMPRESSION:  Large left hilar and left upper lobe tumor as described with confluent   multistation mediastinal involvement versus lymphadenopathy and moderate   to large loculated left pleural effusion is highly suspicious for small   cell lung carcinoma. Recommend biopsy to confirm diagnosis  Small pericardial effusion.  Heterogeneous mid to distal pancreatic body 4.7 cm mass, presumably   metastati  --- End of Report ---         77F     with h/o appendectomy, ex-smoker      presenting with cough x1 month   and mild  sob        found to have a large ELYSE and hilar lung tumor with associated large left pleural effusion.    ct c/a/p,  left hilar/ lung mas.   L  pl  effusion,  mediastinal l adenopathy,  pancreatic  mass small pericardial  effusion,    suspect metastatic lung cancer.  oncology dr jose     thoracic team  and Left chest tube  on 10/ 7,  follow  cytology  HTN, on toprol   cxr., no  pneumothorax     await  biopsy  on dvt  ppx     son  chaim/         ra< from: CT Abdomen and Pelvis w/ IV Cont (10.07.22 @ 16:41) >  IMPRESSION:  Large left hilar and left upper lobe tumor as described with confluent   multistation mediastinal involvement versus lymphadenopathy and moderate   to large loculated left pleural effusion is highly suspicious for small   cell lung carcinoma. Recommend biopsy to confirm diagnosis  Small pericardial effusion.  Heterogeneous mid to distal pancreatic body 4.7 cm mass, presumably   metastati  --- End of Report ---

## 2022-10-10 LAB
ANION GAP SERPL CALC-SCNC: 11 MMOL/L — SIGNIFICANT CHANGE UP (ref 5–17)
BLD GP AB SCN SERPL QL: NEGATIVE — SIGNIFICANT CHANGE UP
BUN SERPL-MCNC: 18 MG/DL — SIGNIFICANT CHANGE UP (ref 7–23)
CALCIUM SERPL-MCNC: 9.1 MG/DL — SIGNIFICANT CHANGE UP (ref 8.4–10.5)
CHLORIDE SERPL-SCNC: 103 MMOL/L — SIGNIFICANT CHANGE UP (ref 96–108)
CO2 SERPL-SCNC: 26 MMOL/L — SIGNIFICANT CHANGE UP (ref 22–31)
CREAT SERPL-MCNC: 0.54 MG/DL — SIGNIFICANT CHANGE UP (ref 0.5–1.3)
EGFR: 94 ML/MIN/1.73M2 — SIGNIFICANT CHANGE UP
GLUCOSE SERPL-MCNC: 97 MG/DL — SIGNIFICANT CHANGE UP (ref 70–99)
HCT VFR BLD CALC: 33.9 % — LOW (ref 34.5–45)
HGB BLD-MCNC: 10.4 G/DL — LOW (ref 11.5–15.5)
MCHC RBC-ENTMCNC: 18.4 PG — LOW (ref 27–34)
MCHC RBC-ENTMCNC: 30.7 GM/DL — LOW (ref 32–36)
MCV RBC AUTO: 59.9 FL — LOW (ref 80–100)
NRBC # BLD: 0 /100 WBCS — SIGNIFICANT CHANGE UP (ref 0–0)
PLATELET # BLD AUTO: 164 K/UL — SIGNIFICANT CHANGE UP (ref 150–400)
POTASSIUM SERPL-MCNC: 3.9 MMOL/L — SIGNIFICANT CHANGE UP (ref 3.5–5.3)
POTASSIUM SERPL-SCNC: 3.9 MMOL/L — SIGNIFICANT CHANGE UP (ref 3.5–5.3)
RBC # BLD: 5.66 M/UL — HIGH (ref 3.8–5.2)
RBC # FLD: 16.3 % — HIGH (ref 10.3–14.5)
RH IG SCN BLD-IMP: POSITIVE — SIGNIFICANT CHANGE UP
SODIUM SERPL-SCNC: 140 MMOL/L — SIGNIFICANT CHANGE UP (ref 135–145)
WBC # BLD: 6.65 K/UL — SIGNIFICANT CHANGE UP (ref 3.8–10.5)
WBC # FLD AUTO: 6.65 K/UL — SIGNIFICANT CHANGE UP (ref 3.8–10.5)

## 2022-10-10 PROCEDURE — 99231 SBSQ HOSP IP/OBS SF/LOW 25: CPT

## 2022-10-10 PROCEDURE — 71045 X-RAY EXAM CHEST 1 VIEW: CPT | Mod: 26

## 2022-10-10 PROCEDURE — 99232 SBSQ HOSP IP/OBS MODERATE 35: CPT | Mod: 57

## 2022-10-10 PROCEDURE — 93306 TTE W/DOPPLER COMPLETE: CPT | Mod: 26

## 2022-10-10 RX ORDER — ALPRAZOLAM 0.25 MG
0.25 TABLET ORAL EVERY 12 HOURS
Refills: 0 | Status: DISCONTINUED | OUTPATIENT
Start: 2022-10-10 | End: 2022-10-14

## 2022-10-10 RX ORDER — ALPRAZOLAM 0.25 MG
0.25 TABLET ORAL ONCE
Refills: 0 | Status: DISCONTINUED | OUTPATIENT
Start: 2022-10-10 | End: 2022-10-10

## 2022-10-10 RX ADMIN — Medication 25 MILLIGRAM(S): at 05:39

## 2022-10-10 RX ADMIN — Medication 650 MILLIGRAM(S): at 06:40

## 2022-10-10 RX ADMIN — Medication 650 MILLIGRAM(S): at 14:37

## 2022-10-10 RX ADMIN — Medication 650 MILLIGRAM(S): at 22:14

## 2022-10-10 RX ADMIN — Medication 650 MILLIGRAM(S): at 05:40

## 2022-10-10 RX ADMIN — HEPARIN SODIUM 5000 UNIT(S): 5000 INJECTION INTRAVENOUS; SUBCUTANEOUS at 17:02

## 2022-10-10 RX ADMIN — Medication 650 MILLIGRAM(S): at 21:14

## 2022-10-10 RX ADMIN — Medication 650 MILLIGRAM(S): at 15:15

## 2022-10-10 RX ADMIN — AMLODIPINE BESYLATE 5 MILLIGRAM(S): 2.5 TABLET ORAL at 05:39

## 2022-10-10 RX ADMIN — HEPARIN SODIUM 5000 UNIT(S): 5000 INJECTION INTRAVENOUS; SUBCUTANEOUS at 05:39

## 2022-10-10 NOTE — CONSULT NOTE ADULT - ASSESSMENT
77 F no significant PMH presenting to ED with 1 month of SOB and cough. cough was productive now dry. Pt saw PCP 1 week ago for sx and had a CXR ordered which showed increased haziness throughout most of the left mid and lower thorax with small areas of residual aeration upper thorax, clear right lung. Pt has appointment with pulmonologist in 3 weeks but did not want to wait that long so came to ED today. Denies fever, HA, CP, abdominal pain, dysuria, N/V/D. Has not taken any medications for sx.  pt with sob and large pleural effusion ?sec to metastatic ca  agree with chest tube  dvt prophylaxis  biopsy  onc eval/palliative care  htn will adjust meds, hold bp meds for now  
77F with h/o appendectomy, ex-smoker presenting with cough x1 month found to have a large ELYSE and hilar lung tumor with associated large left pleural effusion. Currently hemodynamically stable and on room air.    Plan/Recommendations:  - Will place L pigtail catheter at bedside  - Will send cytology and cultures from pleural fluid    D/w Dr. Nigel Coker, PGY3  Thoracic Surgery [id number] 
Interventional Radiology    Evaluate for Procedure: request for possible lung mass biopsy    HPI: 76 yo female ex-smoker p/w cough found to have a large left hilarity and left upper lobe tumor with confluent multi station mediastinal involvement and large located left pleural effusion s/p chest tube placement. IR consulted for possible biopsy.     Allergies:   Medications (Abx/Cardiac/Anticoagulation/Blood Products)  amLODIPine   Tablet: 5 milliGRAM(s) Oral (10-09 @ 06:41)  heparin   Injectable: 5000 Unit(s) SubCutaneous (10-09 @ 17:57)  metoprolol succinate ER: 25 milliGRAM(s) Oral (10-09 @ 06:42)    Data:    T(C): 36.8  HR: 85  BP: 116/70  RR: 17  SpO2: 95%    -WBC 8.84 / HgB 11.0 / Hct 35.7 / Plt 194  -Na 138 / Cl 101 / BUN 14 / Glucose 119  -K 3.9 / CO2 26 / Cr 0.45  -ALT -- / Alk Phos -- / T.Bili --  -INR 1.20 / PTT 31.2      Radiology:     Assessment/Plan:   76 yo female ex-smoker p/w cough found to have a large left hilarity and left upper lobe tumor with confluent multi station mediastinal involvement and large located left pleural effusion s/p chest tube placement. IR consulted for possible biopsy.   — pleural fluid from left chest tube sent for further analysis which may provide a diagnosis ; recommend following up results prior to scheduling a biopsy  — if further tissue is required, consider pulmonary consult for EBUS given mediastinal targets which would likely upstage patient as opposed to targeting the left upper lobe mass directly    d/w Dr. Tejada    --  Jose R Campos DO/PRANAV, PGY-4  Vascular and Interventional Radiology   Available on [employer] Teams    - Non-emergent consults: Place IR consult order in [place]  - Emergent issues (pager): [facility] [phone]; [facility] [phone]; [phone]  - Scheduling questions: [facility] [phone]; [facility] [phone]  - Clinic/outpatient booking: [facility] [phone]; [facility] [phone]
77 F  no significant PMH admitted 10/7/22 with 1 month of cough. SOB if any was mild. No phlegm, hemoptysis. Had seen ENT for it  CXR ordered by PCP showed increased haziness throughout most of the left mid and lower thorax with small areas of residual aeration upper thorax, clear right lung.   Ex smoker. No weight loss etc    IMPRESSION: CT's    Large left hilar and left upper lobe tumor as described with confluent   multistation mediastinal involvement versus lymphadenopathy and moderate   to large loculated left pleural effusion is highly suspicious for small   cell lung carcinoma. Recommend biopsy to confirm diagnosis    Small pericardial effusion.    Heterogeneous mid to distal pancreatic body 4.7 cm mass, presumably   metastatic    Patient has left chest tube, cytology etc is pending.  IR has seen patient and Thoracic surgery is following.   Addition bx as needed will be planned.  Clinically has extensive stage SCLC or metastatic NSCLC.  Management to be decided with after pathology is back.   MRI brain to be done.  Discussed with patient and her son at bedside

## 2022-10-10 NOTE — PROGRESS NOTE ADULT - ASSESSMENT
77F     with h/o appendectomy, ex-smoker      presenting with cough x1 month   and mild  sob        found to have a large ELYSE and hilar lung tumor with associated large left pleural effusion.    ct c/a/p,  left hilar/ lung mas.   L  pl  effusion,  mediastinal l adenopathy,  pancreatic  mass small pericardial  effusion,    suspect metastatic lung cancer.  oncology dr jose     thoracic team  and Left chest tube  on 10/ 7,  follow  cytology  HTN, on toprol   cxr., no  pneumothorax     await  biopsy/  IR abdial    spoke with  family  on dvt  ppx     son  chaim/         ra< from: CT Abdomen and Pelvis w/ IV Cont (10.07.22 @ 16:41) >  IMPRESSION:  Large left hilar and left upper lobe tumor as described with confluent   multistation mediastinal involvement versus lymphadenopathy and moderate   to large loculated left pleural effusion is highly suspicious for small   cell lung carcinoma. Recommend biopsy to confirm diagnosis  Small pericardial effusion.  Heterogeneous mid to distal pancreatic body 4.7 cm mass, presumably   metastati  --- End of Report ---         77F     with h/o appendectomy, ex-smoker      presenting with cough x1 month   and mild  sob        found to have a large ELYSE and hilar lung tumor with associated large left pleural effusion.    ct c/a/p,  left hilar/ lung mas.   L  pl  effusion,  mediastinal l adenopathy,  pancreatic  mass small pericardial  effusion,    suspect metastatic lung cancer.  oncology dr jose     thoracic team  and Left chest tube  on 10/ 7,  follow  cytology  HTN, on toprol   cxr., no  pneumothorax     await  biopsy/  IR eval  noted,  no  intervention    spoke with  family  on dvt  ppx   needs  EBUS/ bx/ discussed with  jorge albert     son  chaim/  [phone]       ra< from: CT Abdomen and Pelvis w/ IV Cont (10.07.22 @ 16:41) >  IMPRESSION:  Large left hilar and left upper lobe tumor as described with confluent   multistation mediastinal involvement versus lymphadenopathy and moderate   to large loculated left pleural effusion is highly suspicious for small   cell lung carcinoma. Recommend biopsy to confirm diagnosis  Small pericardial effusion.  Heterogeneous mid to distal pancreatic body 4.7 cm mass, presumably   metastati  --- End of Report ---         77F     with h/o appendectomy, ex-smoker      presenting with cough x1 month   and mild  sob        found to have a large ELYSE and hilar lung tumor with associated large left pleural effusion.    ct c/a/p,  left hilar/ lung mas.   L  pl  effusion,  mediastinal l adenopathy,  pancreatic  mass small pericardial  effusion,    suspect metastatic lung cancer.  oncology dr jose     thoracic team  and Left chest tube  on 10/ 7,  follow  cytology  HTN, on toprol   cxr., no  pneumothorax     await  biopsy/  IR eval  noted,  no  intervention    spoke with  family/  son  on dvt  ppx   needs  EBUS/ bx/ discussed with  jorge albert     son  chaim/         ra< from: CT Abdomen and Pelvis w/ IV Cont (10.07.22 @ 16:41) >  IMPRESSION:  Large left hilar and left upper lobe tumor as described with confluent   multistation mediastinal involvement versus lymphadenopathy and moderate   to large loculated left pleural effusion is highly suspicious for small   cell lung carcinoma. Recommend biopsy to confirm diagnosis  Small pericardial effusion.  Heterogeneous mid to distal pancreatic body 4.7 cm mass, presumably   metastati  --- End of Report ---

## 2022-10-10 NOTE — PROGRESS NOTE ADULT - PROBLEM SELECTOR PLAN 1
keep Lt PTC   Pleurvac    h20   follow up cyto and pl cx  OOB to chair   \  for all meals to assist in drainage  recommend MRI head to r/o mass keep Lt PTC   Pleurvac    h20   follow up cyto and pl cx  OOB to chair   \  for all meals to assist in drainage  recommend MRI head to r/o mass     onc recs:  bx of mass

## 2022-10-10 NOTE — PROGRESS NOTE ADULT - SUBJECTIVE AND OBJECTIVE BOX
VITAL SIGNS      Vital Signs Last 24 Hrs  T(C): 36.7 (10-10-22 @ 04:29), Max: 37 (10-09-22 @ 21:17)  T(F): 98 (10-10-22 @ 04:29), Max: 98.6 (10-09-22 @ 21:17)  HR: 81 (10-10-22 @ 04:29) (78 - 97)  BP: 125/65 (10-10-22 @ 04:29) (107/70 - 136/60)  RR: 18 (10-10-22 @ 04:29) (17 - 18)  SpO2: 96% (10-10-22 @ 04:29) (94% - 96%)                   Daily     Daily         CAPILLARY BLOOD GLUCOSE              Drains:                    L Pleural  to water seal                    PHYSICAL EXAM  s"  Neurology: alert and oriented x 3, moves all extremities with no defecits  CV :  RRR  Lungs:   Abdomen: soft, nontender, nondistended, positive bowel sounds,   Extremities:                                            VITAL SIGNS      Vital Signs Last 24 Hrs  T(C): 36.7 (10-10-22 @ 04:29), Max: 37 (10-09-22 @ 21:17)  T(F): 98 (10-10-22 @ 04:29), Max: 98.6 (10-09-22 @ 21:17)  HR: 81 (10-10-22 @ 04:29) (78 - 97)  BP: 125/65 (10-10-22 @ 04:29) (107/70 - 136/60)  RR: 18 (10-10-22 @ 04:29) (17 - 18)  SpO2: 96% (10-10-22 @ 04:29) (94% - 96%)                   Daily     Daily         CAPILLARY BLOOD GLUCOSE              Drains:                    L Pleural  to water seal                    PHYSICAL EXAM  s"NO SOB  NO CHEST PAIN"  Neurology: alert and oriented x 3, moves all extremities with no defecits  CV :  RRR  Lungs:    LT SIDE DIMINISEHED throughout  Abdomen: soft, nontender, nondistended, positive bowel sounds,   Extremities:     no edema

## 2022-10-10 NOTE — CONSULT NOTE ADULT - SUBJECTIVE AND OBJECTIVE BOX
HPI:  77 F     no significant PMH     presenting to ED with 1 month of SOB and cough.     Pt saw PCP 1 week ago for sx and had a CXR ordered which showed increased haziness throughout most of the left mid and lower thorax with small areas of residual aeration upper thorax, clear right lung. Pt has appointment with pulmonologist in 3 weeks but did not want to wait that long so came to ED today.     Denies fever, cp/ , abdominal pain, dysuria, N/V/D.  (07 Oct 2022 19:24)    PAST MEDICAL & SURGICAL HISTORY:  No pertinent past medical history      No significant past surgical history          No Known Allergies      FAMILY HISTORY:    Social History:    Medications:  acetaminophen     Tablet .. 650 milliGRAM(s) Oral every 6 hours PRN Mild Pain (1 - 3), Moderate Pain (4 - 6)  ALPRAZolam 0.25 milliGRAM(s) Oral once  amLODIPine   Tablet 5 milliGRAM(s) Oral daily  heparin   Injectable 5000 Unit(s) SubCutaneous every 12 hours  HYDROmorphone  Injectable 0.5 milliGRAM(s) IV Push every 6 hours PRN Moderate Pain (4 - 6)  metoprolol succinate ER 25 milliGRAM(s) Oral daily  senna 2 Tablet(s) Oral at bedtime    Labs:                        10.4   6.65  )-----------( 164      ( 10 Oct 2022 08:00 )             33.9     10-10    140  |  103  |  18  ----------------------------<  97  3.9   |  26  |  0.54    Ca    9.1      10 Oct 2022 08:00      Radiology:     < from: CT Chest w/ IV Cont (10.07.22 @ 16:41) >  CHEST:  LUNGS AND LARGE AIRWAYS: Air-fluid level within left mainstem bronchus.  Complete opacification of the left upper lobe and lingular bronchi.  Large approximately 9 cm left hilar tumor extends into the left upper   lobe and is also confluent with the left mediastinum, AP window,   precarinal and subcarinal catrachita bulky lymphadenopathy versus direct   extension of tumor.  Tumor encases and narrows the left pulmonary arteries with encasement of   the left mainstembronchus and is inseparable from the aortic arch   through middescending aorta as well as the lower esophagus.  Postobstructive lingular atelectasis  Mild right basilar subsegmental atelectasis. Calcified granulomata.  PLEURA: Moderate to large loculated left pleural effusion with adjacent   atelectasis. Tumor abuts pleura anteriorly  VESSELS: Atherosclerotic changes of the aorta and coronary arteries.   Dilated ascending aorta measures 3.8 cm. Tumor vascular encasement as   described above  HEART: Heart size is normal. Small pericardial effusion  MEDIASTINUM AND ADE: Bulky multistation mediastinal lymphadenopathy   confluent with the left hilar and left upper lobe tumor. Additional   enlarged 1.7 x 1.2 cm lymph node interposed between the left common   carotid and left subclavian arteries.  CHEST WALL AND LOWER NECK: No significant acute abnormality.    ABDOMEN AND PELVIS:  LIVER: Within normal limits.  BILE DUCTS: Normal caliber.  GALLBLADDER: Nondistended  SPLEEN: Top normal splenic length  PANCREAS: Mid to distal body heterogeneous approximately 4.7 cm mass  ADRENALS: Within normal limits.  KIDNEYS/URETERS: Symmetric renal enhancement without hydronephrosis.   Subcentimeter right upper pole hypodensity too small for definitive   characterization    BLADDER: Minimally distended.  REPRODUCTIVE ORGANS: Atrophic    BOWEL: No bowel obstruction. Appendix is not visualized. No evidence of   inflammation in the pericecal region. Mild colonic diverticulosis  PERITONEUM: No ascites.  VESSELS: Atherosclerotic changes.  RETROPERITONEUM/LYMPH NODES: No lymphadenopathy.  ABDOMINAL WALL: Tiny fat-containing umbilical hernia.  BONES: Multilevel degenerative changes throughout the spine including   age-indeterminate mild L5 compression deformity.    < end of copied text >    Assessment/Plan:   76 yo female ex-smoker p/w cough found to have a large left hilarity and left upper lobe tumor with confluent multi station mediastinal involvement and large located left pleural effusion s/p chest tube placement. IR consulted for possible biopsy.   — pleural fluid from left chest tube sent for further analysis which may provide a diagnosis ; recommend following up results prior to scheduling a biopsy  — if further tissue is required, consider pulmonary consult for EBUS given mediastinal targets which would likely upstage patient as opposed to targeting the left upper lobe mass directly      ROS:  No pain, no fever  No lumps in neck or pain  No Sob or chest pain  No palpitations   No abdominal pain. No change in bowel habit. No blood in stools  No weakness in extremities  No leg swelling  Rest of comprehensive ROS was negative    Vital Signs Last 24 Hrs  T(C): 36.6 (10 Oct 2022 08:20), Max: 37 (09 Oct 2022 21:17)  T(F): 97.8 (10 Oct 2022 08:20), Max: 98.6 (09 Oct 2022 21:17)  HR: 79 (10 Oct 2022 08:20) (78 - 97)  BP: 123/69 (10 Oct 2022 08:20) (107/70 - 128/76)  BP(mean): --  RR: 18 (10 Oct 2022 08:20) (17 - 18)  SpO2: 95% (10 Oct 2022 08:20) (94% - 96%)    Parameters below as of 10 Oct 2022 08:20  Patient On (Oxygen Delivery Method): room air        Physical Exam:  Patient comfortable  AXOX3  Neck supple, no LN's  Chest: bilateral breath sounds, no wheeze or rales  CVS: regular heart rate without murmur  Abdomen: soft, BS+, no massess or organomegaly  CNS: no gross deficit  Musculoskeletal: Normal range of motion  Skin: no rash       HPI:  77 F  no significant PMH admitted 10/7/22 with 1 month of cough. SOB if any was mild. No phlegm, hemoptysis. Had seen ENT for it  CXR ordered by PCP showed increased haziness throughout most of the left mid and lower thorax with small areas of residual aeration upper thorax, clear right lung.   Ex smoker. No weight loss etc  PAST MEDICAL & SURGICAL HISTORY:  No pertinent past medical history      No significant past surgical history          No Known Allergies      FAMILY HISTORY:    Social History: Used to smoke for many years, now does not smoke    Medications:  acetaminophen     Tablet .. 650 milliGRAM(s) Oral every 6 hours PRN Mild Pain (1 - 3), Moderate Pain (4 - 6)  ALPRAZolam 0.25 milliGRAM(s) Oral once  amLODIPine   Tablet 5 milliGRAM(s) Oral daily  heparin   Injectable 5000 Unit(s) SubCutaneous every 12 hours  HYDROmorphone  Injectable 0.5 milliGRAM(s) IV Push every 6 hours PRN Moderate Pain (4 - 6)  metoprolol succinate ER 25 milliGRAM(s) Oral daily  senna 2 Tablet(s) Oral at bedtime    Labs:                        10.4   6.65  )-----------( 164      ( 10 Oct 2022 08:00 )             33.9     10-10    140  |  103  |  18  ----------------------------<  97  3.9   |  26  |  0.54    Ca    9.1      10 Oct 2022 08:00      Radiology:     < from: CT Chest w/ IV Cont (10.07.22 @ 16:41) >  CHEST:  LUNGS AND LARGE AIRWAYS: Air-fluid level within left mainstem bronchus.  Complete opacification of the left upper lobe and lingular bronchi.  Large approximately 9 cm left hilar tumor extends into the left upper   lobe and is also confluent with the left mediastinum, AP window,   precarinal and subcarinal catrachita bulky lymphadenopathy versus direct   extension of tumor.  Tumor encases and narrows the left pulmonary arteries with encasement of   the left mainstembronchus and is inseparable from the aortic arch   through middescending aorta as well as the lower esophagus.  Postobstructive lingular atelectasis  Mild right basilar subsegmental atelectasis. Calcified granulomata.  PLEURA: Moderate to large loculated left pleural effusion with adjacent   atelectasis. Tumor abuts pleura anteriorly  VESSELS: Atherosclerotic changes of the aorta and coronary arteries.   Dilated ascending aorta measures 3.8 cm. Tumor vascular encasement as   described above  HEART: Heart size is normal. Small pericardial effusion  MEDIASTINUM AND ADE: Bulky multistation mediastinal lymphadenopathy   confluent with the left hilar and left upper lobe tumor. Additional   enlarged 1.7 x 1.2 cm lymph node interposed between the left common   carotid and left subclavian arteries.  CHEST WALL AND LOWER NECK: No significant acute abnormality.    ABDOMEN AND PELVIS:  LIVER: Within normal limits.  BILE DUCTS: Normal caliber.  GALLBLADDER: Nondistended  SPLEEN: Top normal splenic length  PANCREAS: Mid to distal body heterogeneous approximately 4.7 cm mass  ADRENALS: Within normal limits.  KIDNEYS/URETERS: Symmetric renal enhancement without hydronephrosis.   Subcentimeter right upper pole hypodensity too small for definitive   characterization    BLADDER: Minimally distended.  REPRODUCTIVE ORGANS: Atrophic    BOWEL: No bowel obstruction. Appendix is not visualized. No evidence of   inflammation in the pericecal region. Mild colonic diverticulosis  PERITONEUM: No ascites.  VESSELS: Atherosclerotic changes.  RETROPERITONEUM/LYMPH NODES: No lymphadenopathy.  ABDOMINAL WALL: Tiny fat-containing umbilical hernia.  BONES: Multilevel degenerative changes throughout the spine including   age-indeterminate mild L5 compression deformity.    < end of copied text >    Assessment/Plan:   78 yo female ex-smoker p/w cough found to have a large left hilarity and left upper lobe tumor with confluent multi station mediastinal involvement and large located left pleural effusion s/p chest tube placement. IR consulted for possible biopsy.   — pleural fluid from left chest tube sent for further analysis which may provide a diagnosis ; recommend following up results prior to scheduling a biopsy  — if further tissue is required, consider pulmonary consult for EBUS given mediastinal targets which would likely upstage patient as opposed to targeting the left upper lobe mass directly      ROS:  No pain, no fever  No lumps in neck or pain  No Sob or chest pain. Has chest tube on left side  No palpitations   No abdominal pain. No change in bowel habit. No blood in stools  No weakness in extremities  No leg swelling  Rest of comprehensive ROS was negative    Vital Signs Last 24 Hrs  T(C): 36.6 (10 Oct 2022 08:20), Max: 37 (09 Oct 2022 21:17)  T(F): 97.8 (10 Oct 2022 08:20), Max: 98.6 (09 Oct 2022 21:17)  HR: 79 (10 Oct 2022 08:20) (78 - 97)  BP: 123/69 (10 Oct 2022 08:20) (107/70 - 128/76)  BP(mean): --  RR: 18 (10 Oct 2022 08:20) (17 - 18)  SpO2: 95% (10 Oct 2022 08:20) (94% - 96%)    Parameters below as of 10 Oct 2022 08:20  Patient On (Oxygen Delivery Method): room air        Physical Exam:  Patient comfortable  AXOX3  Neck supple, no LN's  Chest: Has chest tube on left side, left Right side BS ok  CVS: S1S2,   Abdomen: soft, BS+, no massess or organomegaly  CNS: no gross deficit  Musculoskeletal: Normal range of motion  Skin: no rash

## 2022-10-10 NOTE — PROGRESS NOTE ADULT - SUBJECTIVE AND OBJECTIVE BOX
[facility]    REVIEW OF SYSTEMS:  GEN: no fever,    no chills  RESP: no SOB,   no cough  CVS: no chest pain,   no palpitations  GI: no abdominal pain,   no nausea,   no vomiting,   no constipation,   no diarrhea  : no dysuria,   no frequency  NEURO: no headache,   no dizziness  PSYCH: no depression,   not anxious  Derm : no rash    MEDICATIONS  (STANDING):  ALPRAZolam 0.25 milliGRAM(s) Oral once  amLODIPine   Tablet 5 milliGRAM(s) Oral daily  heparin   Injectable 5000 Unit(s) SubCutaneous every 12 hours  metoprolol succinate ER 25 milliGRAM(s) Oral daily  senna 2 Tablet(s) Oral at bedtime    MEDICATIONS  (PRN):  acetaminophen     Tablet .. 650 milliGRAM(s) Oral every 6 hours PRN Mild Pain (1 - 3), Moderate Pain (4 - 6)  HYDROmorphone  Injectable 0.5 milliGRAM(s) IV Push every 6 hours PRN Moderate Pain (4 - 6)      Vital Signs Last 24 Hrs  T(C): 36.6 (10 Oct 2022 08:20), Max: 37 (09 Oct 2022 21:17)  T(F): 97.8 (10 Oct 2022 08:20), Max: 98.6 (09 Oct 2022 21:17)  HR: 79 (10 Oct 2022 08:20) (78 - 97)  BP: 123/69 (10 Oct 2022 08:20) (107/70 - 128/76)  BP(mean): --  RR: 18 (10 Oct 2022 08:20) (17 - 18)  SpO2: 95% (10 Oct 2022 08:20) (94% - 96%)    Parameters below as of 10 Oct 2022 08:20  Patient On (Oxygen Delivery Method): room air      CAPILLARY BLOOD GLUCOSE        I&O's Summary    09 Oct 2022 07:01  -  10 Oct 2022 07:00  --------------------------------------------------------  IN: 175 mL / OUT: 500 mL / NET: -325 mL        PHYSICAL EXAM:  HEAD:  Atraumatic, Normocephalic  NECK: Supple, No   JVD  CHEST/LUNG:   no     rales,     no,    rhonchi  HEART: Regular rate and rhythm;         murmur  ABDOMEN: Soft, Nontender, ;   EXTREMITIES:        edema  NEUROLOGY:  alert    LABS:                        10.4   6.65  )-----------( 164      ( 10 Oct 2022 08:00 )             33.9     10-10    140  |  103  |  18  ----------------------------<  97  3.9   |  26  |  0.54    Ca    9.1      10 Oct 2022 08:00                              Consultant(s) Notes Reviewed:      Care Discussed with Consultants/Other Providers:

## 2022-10-10 NOTE — PROGRESS NOTE ADULT - SUBJECTIVE AND OBJECTIVE BOX
CARDIOLOGY     PROGRESS  NOTE   ________________________________________________    CHIEF COMPLAINT:Patient is a 77y old  Female who presents with a chief complaint of lt pl eff   and lt Lung mass (10 Oct 2022 07:27)  no complain.  	  REVIEW OF SYSTEMS:  CONSTITUTIONAL: No fever, weight loss, or fatigue  EYES: No eye pain, visual disturbances, or discharge  ENT:  No difficulty hearing, tinnitus, vertigo; No sinus or throat pain  NECK: No pain or stiffness  RESPIRATORY: No cough, wheezing, chills or hemoptysis; no Shortness of Breath  CARDIOVASCULAR: No chest pain, palpitations, passing out, dizziness, or leg swelling  GASTROINTESTINAL: No abdominal or epigastric pain. No nausea, vomiting, or hematemesis; No diarrhea or constipation. No melena or hematochezia.  GENITOURINARY: No dysuria, frequency, hematuria, or incontinence  NEUROLOGICAL: No headaches, memory loss, loss of strength, numbness, or tremors  SKIN: No itching, burning, rashes, or lesions   LYMPH Nodes: No enlarged glands  ENDOCRINE: No heat or cold intolerance; No hair loss  MUSCULOSKELETAL: No joint pain or swelling; No muscle, back, or extremity pain  PSYCHIATRIC: No depression, anxiety, mood swings, or difficulty sleeping  HEME/LYMPH: No easy bruising, or bleeding gums  ALLERGY AND IMMUNOLOGIC: No hives or eczema	    [ ] All others negative	  [ ] Unable to obtain    PHYSICAL EXAM:  T(C): 36.7 (10-10-22 @ 04:29), Max: 37 (10-09-22 @ 21:17)  HR: 81 (10-10-22 @ 04:29) (78 - 97)  BP: 125/65 (10-10-22 @ 04:29) (107/70 - 136/60)  RR: 18 (10-10-22 @ 04:29) (17 - 18)  SpO2: 96% (10-10-22 @ 04:29) (94% - 96%)  Wt(kg): --  I&O's Summary    09 Oct 2022 07:01  -  10 Oct 2022 07:00  --------------------------------------------------------  IN: 175 mL / OUT: 500 mL / NET: -325 mL        Appearance: Normal	  HEENT:   Normal oral mucosa, PERRL, EOMI	  Lymphatic: No lymphadenopathy  Cardiovascular: Normal S1 S2, No JVD, + murmurs, No edema  Respiratory: Lungs clear to auscultation	  Psychiatry: A & O x 3, Mood & affect appropriate  Gastrointestinal:  Soft, Non-tender, + BS	  Skin: No rashes, No ecchymoses, No cyanosis	  Neurologic: Non-focal  Extremities: Normal range of motion, No clubbing, cyanosis or edema  Vascular: Peripheral pulses palpable 2+ bilaterally    MEDICATIONS  (STANDING):  amLODIPine   Tablet 5 milliGRAM(s) Oral daily  heparin   Injectable 5000 Unit(s) SubCutaneous every 12 hours  metoprolol succinate ER 25 milliGRAM(s) Oral daily  senna 2 Tablet(s) Oral at bedtime      TELEMETRY: 	    ECG:  	  RADIOLOGY:  OTHER: 	  	  LABS:	 	    CARDIAC MARKERS:                                11.0   8.84  )-----------( 194      ( 08 Oct 2022 09:58 )             35.7     10-08    138  |  101  |  14  ----------------------------<  119<H>  3.9   |  26  |  0.45<L>    Ca    9.5      08 Oct 2022 09:58      proBNP: Serum Pro-Brain Natriuretic Peptide: 194 pg/mL (10-07 @ 14:12)    Lipid Profile:   HgA1c:   TSH:   ·  Problem: Pleural effusion, left.   ·  Plan: keep Lt PTC   Pleurvac    h20   follow up cyto and pl cx  OOB to chair    for all meals to assist in drainage  recommend MRI head to r/o mass.      Assessment and plan  ---------------------------  77 F no significant PMH presenting to ED with 1 month of SOB and cough. cough was productive now dry. Pt saw PCP 1 week ago for sx and had a CXR ordered which showed increased haziness throughout most of the left mid and lower thorax with small areas of residual aeration upper thorax, clear right lung. Pt has appointment with pulmonologist in 3 weeks but did not want to wait that long so came to ED today. Denies fever, HA, CP, abdominal pain, dysuria, N/V/D. Has not taken any medications for sx.  pt with sob and large pleural effusion ?sec to metastatic ca  s/p chest tube/ pulmonary/ thoracic eval and follow up  dvt prophylaxis  biopsy  htn will adjust meds, hold bp meds for now  increasing beta blocker  brain MRI r/o mets  will adjust bp med  echo  thoracis surgery appreciated  awaiting MRI

## 2022-10-10 NOTE — PROGRESS NOTE ADULT - ASSESSMENT
77F with h/o appendectomy, ex-smoker presenting with cough x1 month found to have a large ELYSE and hilar lung tumor with associated large left pleural effusion. Currently hemodynamically stable and on room air.     L pigtail catheter at bedside   cytology and cultures from pleural flui  10/8   lt ptc   draining  lws  ambulated  10/9 77F with h/o appendectomy, ex-smoker presenting with cough x1 month found to have a large ELYSE and hilar lung tumor with associated large left pleural effusion. Currently hemodynamically stable and on room air.     L pigtail catheter at bedside   cytology and cultures from pleural flui  10/8   lt ptc   draining  lws  ambulated  10/9   lt pl tube draining  on water seal

## 2022-10-11 PROBLEM — Z00.00 ENCOUNTER FOR PREVENTIVE HEALTH EXAMINATION: Status: ACTIVE | Noted: 2022-10-11

## 2022-10-11 PROCEDURE — 99231 SBSQ HOSP IP/OBS SF/LOW 25: CPT

## 2022-10-11 PROCEDURE — 71045 X-RAY EXAM CHEST 1 VIEW: CPT | Mod: 26

## 2022-10-11 PROCEDURE — 99232 SBSQ HOSP IP/OBS MODERATE 35: CPT | Mod: 57

## 2022-10-11 RX ADMIN — AMLODIPINE BESYLATE 5 MILLIGRAM(S): 2.5 TABLET ORAL at 05:18

## 2022-10-11 RX ADMIN — Medication 25 MILLIGRAM(S): at 05:18

## 2022-10-11 RX ADMIN — Medication 650 MILLIGRAM(S): at 19:44

## 2022-10-11 RX ADMIN — Medication 650 MILLIGRAM(S): at 20:15

## 2022-10-11 RX ADMIN — HEPARIN SODIUM 5000 UNIT(S): 5000 INJECTION INTRAVENOUS; SUBCUTANEOUS at 05:17

## 2022-10-11 RX ADMIN — HEPARIN SODIUM 5000 UNIT(S): 5000 INJECTION INTRAVENOUS; SUBCUTANEOUS at 17:17

## 2022-10-11 NOTE — CHART NOTE - NSCHARTNOTEFT_GEN_A_CORE
Patient high risk for EBUS. Will plan for image guided ELYSE mass biopsy on Thurs 10/13 pending pleural fluid cytology results.     -above d/w primary team  -keep pt NPO after midnight tomorrow   -STAT am labs  -COVID PCR within 5 days  -maintain active T&S  -Hold SQ hep x 24hr

## 2022-10-11 NOTE — PROGRESS NOTE ADULT - SUBJECTIVE AND OBJECTIVE BOX
saulo    REVIEW OF SYSTEMS:  GEN: no fever,    no chills  RESP: no SOB,   no cough  CVS: no chest pain,   no palpitations  GI: no abdominal pain,   no nausea,   no vomiting,   no constipation,   no diarrhea  : no dysuria,   no frequency  NEURO: no headache,   no dizziness  PSYCH: no depression,   not anxious  Derm : no rash    MEDICATIONS  (STANDING):  amLODIPine   Tablet 5 milliGRAM(s) Oral daily  heparin   Injectable 5000 Unit(s) SubCutaneous every 12 hours  metoprolol succinate ER 25 milliGRAM(s) Oral daily  senna 2 Tablet(s) Oral at bedtime    MEDICATIONS  (PRN):  acetaminophen     Tablet .. 650 milliGRAM(s) Oral every 6 hours PRN Mild Pain (1 - 3), Moderate Pain (4 - 6)  ALPRAZolam 0.25 milliGRAM(s) Oral every 12 hours PRN anxiety  HYDROmorphone  Injectable 0.5 milliGRAM(s) IV Push every 6 hours PRN Moderate Pain (4 - 6)      Vital Signs Last 24 Hrs  T(C): 36.7 (11 Oct 2022 08:20), Max: 36.8 (10 Oct 2022 13:15)  T(F): 98.1 (11 Oct 2022 08:20), Max: 98.2 (10 Oct 2022 13:15)  HR: 81 (11 Oct 2022 08:20) (74 - 89)  BP: 119/67 (11 Oct 2022 08:20) (105/57 - 136/67)  BP(mean): --  RR: 18 (11 Oct 2022 08:20) (16 - 18)  SpO2: 95% (11 Oct 2022 08:20) (94% - 97%)    Parameters below as of 11 Oct 2022 08:20  Patient On (Oxygen Delivery Method): room air      CAPILLARY BLOOD GLUCOSE        I&O's Summary    10 Oct 2022 07:01  -  11 Oct 2022 07:00  --------------------------------------------------------  IN: 1020 mL / OUT: 680 mL / NET: 340 mL    11 Oct 2022 07:01  -  11 Oct 2022 12:37  --------------------------------------------------------  IN: 250 mL / OUT: 50 mL / NET: 200 mL        PHYSICAL EXAM:  HEAD:  Atraumatic, Normocephalic  NECK: Supple, No   JVD  CHEST/LUNG:   no     rales,     no,    rhonchi  HEART: Regular rate and rhythm;         murmur  ABDOMEN: Soft, Nontender, ;   EXTREMITIES:        edema  NEUROLOGY:  alert    LABS:                        10.4   6.65  )-----------( 164      ( 10 Oct 2022 08:00 )             33.9     10-10    140  |  103  |  18  ----------------------------<  97  3.9   |  26  |  0.54    Ca    9.1      10 Oct 2022 08:00                              Consultant(s) Notes Reviewed:      Care Discussed with Consultants/Other Providers:

## 2022-10-11 NOTE — PROGRESS NOTE ADULT - ASSESSMENT
77F with h/o appendectomy, ex-smoker presenting with cough x1 month found to have a large ELYSE and hilar lung tumor with associated large left pleural effusion. Currently hemodynamically stable and on room air.     L pigtail catheter at bedside   cytology and cultures from pleural flui  10/8   lt ptc   draining  lws  ambulated  10/9   lt pl tube draining  on water seal  10/11 L PTC - H2O with 90 output overnight

## 2022-10-11 NOTE — CHART NOTE - NSCHARTNOTEFT_GEN_A_CORE
Called by RN as patient refusing MRI of head to r/o mets, despite prior order for Xanex to be given before MRI.  Patient seen and examined at bedside. Patient adamantly refusing to take Xanex, states does not want to have MRI and prefers to go home.   Consider MRI with anesthesia, however, patient declining to have anesthesia for procedure as well.   Dr. Martel aware.   MRI order discontinued.   Patient to be discharged when cleared by thoracic surgery team.     Aurora Sanabria, PA-C  [id number]

## 2022-10-11 NOTE — PROGRESS NOTE ADULT - SUBJECTIVE AND OBJECTIVE BOX
Patient is a 77y old  Female who presents with a chief complaint of sob (11 Oct 2022 08:53)      SUBJECTIVE: " Hi"     Vital Signs Last 24 Hrs  T(C): 36.4 (10-11-22 @ 04:51), Max: 36.8 (10-10-22 @ 13:15)  T(F): 97.5 (10-11-22 @ 04:51), Max: 98.2 (10-10-22 @ 13:15)  HR: 89 (10-11-22 @ 04:51) (74 - 89)  BP: 120/65 (10-11-22 @ 04:51) (105/57 - 136/67)  RR: 16 (10-11-22 @ 04:51) (16 - 18)  SpO2: 97% (10-11-22 @ 04:51) (94% - 97%)                10-10-22 @ 07:01  -  10-11-22 @ 07:00  --------------------------------------------------------  IN: 1020 mL / OUT: 680 mL / NET: 340 mL        Daily     Daily                           10.4   6.65  )-----------( 164      ( 10 Oct 2022 08:00 )             33.9     10-10    140  |  103  |  18  ----------------------------<  97  3.9   |  26  |  0.54    Ca    9.1      10 Oct 2022 08:00            PHYSICAL EXAM  Neurology: A&Ox3, NAD, no gross deficits  CV : RRR+S1S2  Lungs: Respirations non-labored, B/L BS  Abdomen: Soft, NT/ND, +BSx4Q  Extremities: B/L LE edema, negative calf tenderness, +PP           CHEST TUBES:  Left CT     [  ]LWS  [ X ]H2O seal  Right CT   [  ]LWS  [  ]H2O seal        MEDICATIONS  acetaminophen     Tablet .. 650 milliGRAM(s) Oral every 6 hours PRN  ALPRAZolam 0.25 milliGRAM(s) Oral every 12 hours PRN  amLODIPine   Tablet 5 milliGRAM(s) Oral daily  heparin   Injectable 5000 Unit(s) SubCutaneous every 12 hours  HYDROmorphone  Injectable 0.5 milliGRAM(s) IV Push every 6 hours PRN  metoprolol succinate ER 25 milliGRAM(s) Oral daily  senna 2 Tablet(s) Oral at bedtime

## 2022-10-11 NOTE — PROGRESS NOTE ADULT - SUBJECTIVE AND OBJECTIVE BOX
77 F  no significant PMH admitted 10/7/22 with 1 month of cough. SOB if any was mild. No phlegm, hemoptysis. Had seen ENT for it  CXR ordered by PCP showed increased haziness throughout most of the left mid and lower thorax with small areas of residual aeration upper thorax, clear right lung.   Ex smoker. No weight loss etc  PAST MEDICAL & SURGICAL HISTORY:  No pertinent past medical history      No significant past surgical history        Allergies    No Known Allergies    Intolerances      Social History:    Medications:  acetaminophen     Tablet .. 650 milliGRAM(s) Oral every 6 hours PRN Mild Pain (1 - 3), Moderate Pain (4 - 6)  ALPRAZolam 0.25 milliGRAM(s) Oral every 12 hours PRN anxiety  amLODIPine   Tablet 5 milliGRAM(s) Oral daily  heparin   Injectable 5000 Unit(s) SubCutaneous every 12 hours  HYDROmorphone  Injectable 0.5 milliGRAM(s) IV Push every 6 hours PRN Moderate Pain (4 - 6)  metoprolol succinate ER 25 milliGRAM(s) Oral daily  senna 2 Tablet(s) Oral at bedtime    Labs:  CBC Full  -  ( 10 Oct 2022 08:00 )  WBC Count : 6.65 K/uL  RBC Count : 5.66 M/uL  Hemoglobin : 10.4 g/dL  Hematocrit : 33.9 %  Platelet Count - Automated : 164 K/uL  Mean Cell Volume : 59.9 fl  Mean Cell Hemoglobin : 18.4 pg  Mean Cell Hemoglobin Concentration : 30.7 gm/dL  Auto Neutrophil # : x  Auto Lymphocyte # : x  Auto Monocyte # : x  Auto Eosinophil # : x  Auto Basophil # : x  Auto Neutrophil % : x  Auto Lymphocyte % : x  Auto Monocyte % : x  Auto Eosinophil % : x  Auto Basophil % : x    10-10    140  |  103  |  18  ----------------------------<  97  3.9   |  26  |  0.54    Ca    9.1      10 Oct 2022 08:00        Radiology:             ROS:  Patient comfortable without distress  No SOB or chest pain  No palpitation  No abdominal pain, diarrhaea or constipation  No weakness of extremities  No skin changes or swelling of legs  Rest of the comprehensive ROS was negative  Vital Signs Last 24 Hrs  T(C): 36.4 (11 Oct 2022 12:52), Max: 36.8 (10 Oct 2022 17:14)  T(F): 97.6 (11 Oct 2022 12:52), Max: 98.2 (10 Oct 2022 17:14)  HR: 83 (11 Oct 2022 12:52) (74 - 89)  BP: 129/57 (11 Oct 2022 12:52) (105/57 - 136/67)  BP(mean): --  RR: 18 (11 Oct 2022 12:52) (16 - 18)  SpO2: 97% (11 Oct 2022 12:52) (94% - 97%)    Parameters below as of 11 Oct 2022 12:52  Patient On (Oxygen Delivery Method): room air        Physical exam:  Patient alert and oriented  No distress  CVS: S1, S2   Chest: bilateral breath sound without rales, left sided chest tube  Abdomen: soft, not tender, no organomegaly or masses  CNS: No focal neuro deficit  Musculoskeletal:  Normal range of motion  Skin: No rash    Assessment and Plan:

## 2022-10-11 NOTE — PROGRESS NOTE ADULT - ASSESSMENT
77F     with h/o appendectomy, ex-smoker      presenting with cough x1 month   and mild  sob        found to have a large ELYSE and hilar lung tumor with associated large left pleural effusion.    ct c/a/p,  left hilar/ lung mas.   L  pl  effusion,  mediastinal l adenopathy,  pancreatic  mass small pericardial  effusion,    suspect metastatic lung cancer.  oncology dr jose     thoracic team  and Left chest tube  on 10/ 7,  follow  cytology  HTN, on toprol   cxr., no  pneumothorax    needs  biopsy/  IR eval  noted,  no  intervention    spoke with  family/  son  on dvt  ppx   needs  EBUS/ bx/ discussed with  jorge albert.  will  f/p  today   mri  head, pending     son  chaim/  [phone]       ra< from: CT Abdomen and Pelvis w/ IV Cont (10.07.22 @ 16:41) >  IMPRESSION:  Large left hilar and left upper lobe tumor as described with confluent   multistation mediastinal involvement versus lymphadenopathy and moderate   to large loculated left pleural effusion is highly suspicious for small   cell lung carcinoma. Recommend biopsy to confirm diagnosis  Small pericardial effusion.  Heterogeneous mid to distal pancreatic body 4.7 cm mass, presumably   metastati  --- End of Report ---

## 2022-10-11 NOTE — PROGRESS NOTE ADULT - SUBJECTIVE AND OBJECTIVE BOX
CARDIOLOGY     PROGRESS  NOTE   ________________________________________________    CHIEF COMPLAINT:Patient is a 77y old  Female who presents with a chief complaint of sob (10 Oct 2022 10:11)  no complain  	  REVIEW OF SYSTEMS:  CONSTITUTIONAL: No fever, weight loss, or fatigue  EYES: No eye pain, visual disturbances, or discharge  ENT:  No difficulty hearing, tinnitus, vertigo; No sinus or throat pain  NECK: No pain or stiffness  RESPIRATORY: No cough, wheezing, chills or hemoptysis; No Shortness of Breath  CARDIOVASCULAR: No chest pain, palpitations, passing out, dizziness, or leg swelling  GASTROINTESTINAL: No abdominal or epigastric pain. No nausea, vomiting, or hematemesis; No diarrhea or constipation. No melena or hematochezia.  GENITOURINARY: No dysuria, frequency, hematuria, or incontinence  NEUROLOGICAL: No headaches, memory loss, loss of strength, numbness, or tremors  SKIN: No itching, burning, rashes, or lesions   LYMPH Nodes: No enlarged glands  ENDOCRINE: No heat or cold intolerance; No hair loss  MUSCULOSKELETAL: No joint pain or swelling; No muscle, back, or extremity pain  PSYCHIATRIC: No depression, anxiety, mood swings, or difficulty sleeping  HEME/LYMPH: No easy bruising, or bleeding gums  ALLERGY AND IMMUNOLOGIC: No hives or eczema	    [ ] All others negative	  [x ] Unable to obtain    PHYSICAL EXAM:  T(C): 36.4 (10-11-22 @ 04:51), Max: 36.8 (10-10-22 @ 13:15)  HR: 89 (10-11-22 @ 04:51) (74 - 89)  BP: 120/65 (10-11-22 @ 04:51) (105/57 - 136/67)  RR: 16 (10-11-22 @ 04:51) (16 - 18)  SpO2: 97% (10-11-22 @ 04:51) (94% - 97%)  Wt(kg): --  I&O's Summary    10 Oct 2022 07:01  -  11 Oct 2022 07:00  --------------------------------------------------------  IN: 1020 mL / OUT: 680 mL / NET: 340 mL        Appearance: Normal	  HEENT:   Normal oral mucosa, PERRL, EOMI	  Lymphatic: No lymphadenopathy  Cardiovascular: Normal S1 S2, No JVD, + murmurs, No edema  Respiratory: Lungs clear to auscultation/ chest tube  Psychiatry: A & O x 3, Mood & affect appropriate  Gastrointestinal:  Soft, Non-tender, + BS	  Skin: No rashes, No ecchymoses, No cyanosis	  Neurologic: Non-focal  Extremities: Normal range of motion, No clubbing, cyanosis or edema  Vascular: Peripheral pulses palpable 2+ bilaterally    MEDICATIONS  (STANDING):  amLODIPine   Tablet 5 milliGRAM(s) Oral daily  heparin   Injectable 5000 Unit(s) SubCutaneous every 12 hours  metoprolol succinate ER 25 milliGRAM(s) Oral daily  senna 2 Tablet(s) Oral at bedtime      TELEMETRY: 	    ECG:  	  RADIOLOGY:  OTHER: 	  	  LABS:	 	    CARDIAC MARKERS:                                10.4   6.65  )-----------( 164      ( 10 Oct 2022 08:00 )             33.9     10-10    140  |  103  |  18  ----------------------------<  97  3.9   |  26  |  0.54    Ca    9.1      10 Oct 2022 08:00      proBNP: Serum Pro-Brain Natriuretic Peptide: 194 pg/mL (10-07 @ 14:12)    Lipid Profile:   HgA1c:   TSH:     < from: Transthoracic Echocardiogram (10.10.22 @ 08:10) >  Conclusions:  Normal left ventricular systolic function. No segmental  wall motion abnormalities.  Small pericardial effusion.        Assessment and plan  ---------------------------  77 F no significant PMH presenting to ED with 1 month of SOB and cough. cough was productive now dry. Pt saw PCP 1 week ago for sx and had a CXR ordered which showed increased haziness throughout most of the left mid and lower thorax with small areas of residual aeration upper thorax, clear right lung. Pt has appointment with pulmonologist in 3 weeks but did not want to wait that long so came to ED today. Denies fever, HA, CP, abdominal pain, dysuria, N/V/D. Has not taken any medications for sx.  pt with sob and large pleural effusion ?sec to metastatic ca  s/p chest tube/ pulmonary/ thoracic eval and follow up  dvt prophylaxis  biopsy  htn will adjust meds, hold bp meds for now  increasing beta blocker  brain MRI r/o mets  will adjust bp med  echo noted small pericardial effusion  thoracic  surgery appreciated  awaiting MRI

## 2022-10-11 NOTE — PROGRESS NOTE ADULT - PROBLEM SELECTOR PLAN 1
keep Lt PTC  Pleurvac    h20  follow up cyto and pl cx  OOB to chair   \  for all meals to assist in drainage  recommend MRI head to r/o mass   onc recs:  bx of mass  IR rec pulm consult for EBUS

## 2022-10-11 NOTE — PROGRESS NOTE ADULT - ASSESSMENT
77 F  no significant PMH admitted 10/7/22 with 1 month of cough. SOB if any was mild. No phlegm, hemoptysis. Had seen ENT for it  CXR ordered by PCP showed increased haziness throughout most of the left mid and lower thorax with small areas of residual aeration upper thorax, clear right lung.   Ex smoker. No weight loss etc  IMPRESSION: CT's    Large left hilar and left upper lobe tumor as described with confluent   multistation mediastinal involvement versus lymphadenopathy and moderate   to large loculated left pleural effusion is highly suspicious for small   cell lung carcinoma. Recommend biopsy to confirm diagnosis    Small pericardial effusion.    Heterogeneous mid to distal pancreatic body 4.7 cm mass, presumably   metastatic  Patient has left chest tube, cytology etc is pending.  IR has seen patient and Thoracic surgery is following.   Addition bx as needed will be planned.  Clinically has extensive stage SCLC or metastatic NSCLC.  Management to be decided with after pathology is back.   MRI brain to be done.  Discussed with patient and her son at bedside       77 F  no significant PMH admitted 10/7/22 with 1 month of cough. SOB if any was mild. No phlegm, hemoptysis. Had seen ENT for it  CXR ordered by PCP showed increased haziness throughout most of the left mid and lower thorax with small areas of residual aeration upper thorax, clear right lung.   Ex smoker. No weight loss etc  IMPRESSION: CT's    Large left hilar and left upper lobe tumor as described with confluent   multistation mediastinal involvement versus lymphadenopathy and moderate   to large loculated left pleural effusion is highly suspicious for small   cell lung carcinoma. Recommend biopsy to confirm diagnosis    Small pericardial effusion.    Heterogeneous mid to distal pancreatic body 4.7 cm mass, presumably   metastatic  Patient has left chest tube, cytology etc is pending.  IR has seen patient and Thoracic surgery is following.   Additional  bx as needed will be planned.  Clinically has extensive stage SCLC or metastatic NSCLC.  Management to be decided with after pathology is back.   MRI brain to be done.  Discussed with patient and her son at bedside

## 2022-10-12 LAB
ANION GAP SERPL CALC-SCNC: 11 MMOL/L — SIGNIFICANT CHANGE UP (ref 5–17)
BUN SERPL-MCNC: 15 MG/DL — SIGNIFICANT CHANGE UP (ref 7–23)
CALCIUM SERPL-MCNC: 9.2 MG/DL — SIGNIFICANT CHANGE UP (ref 8.4–10.5)
CHLORIDE SERPL-SCNC: 103 MMOL/L — SIGNIFICANT CHANGE UP (ref 96–108)
CO2 SERPL-SCNC: 26 MMOL/L — SIGNIFICANT CHANGE UP (ref 22–31)
CREAT SERPL-MCNC: 0.53 MG/DL — SIGNIFICANT CHANGE UP (ref 0.5–1.3)
EGFR: 95 ML/MIN/1.73M2 — SIGNIFICANT CHANGE UP
GLUCOSE SERPL-MCNC: 122 MG/DL — HIGH (ref 70–99)
HCT VFR BLD CALC: 31.2 % — LOW (ref 34.5–45)
HGB BLD-MCNC: 9.9 G/DL — LOW (ref 11.5–15.5)
MCHC RBC-ENTMCNC: 18.4 PG — LOW (ref 27–34)
MCHC RBC-ENTMCNC: 31.7 GM/DL — LOW (ref 32–36)
MCV RBC AUTO: 58.1 FL — LOW (ref 80–100)
NON-GYNECOLOGICAL CYTOLOGY STUDY: SIGNIFICANT CHANGE UP
NRBC # BLD: 0 /100 WBCS — SIGNIFICANT CHANGE UP (ref 0–0)
PLATELET # BLD AUTO: 176 K/UL — SIGNIFICANT CHANGE UP (ref 150–400)
POTASSIUM SERPL-MCNC: 4 MMOL/L — SIGNIFICANT CHANGE UP (ref 3.5–5.3)
POTASSIUM SERPL-SCNC: 4 MMOL/L — SIGNIFICANT CHANGE UP (ref 3.5–5.3)
RBC # BLD: 5.37 M/UL — HIGH (ref 3.8–5.2)
RBC # FLD: 16 % — HIGH (ref 10.3–14.5)
SARS-COV-2 RNA SPEC QL NAA+PROBE: SIGNIFICANT CHANGE UP
SODIUM SERPL-SCNC: 140 MMOL/L — SIGNIFICANT CHANGE UP (ref 135–145)
WBC # BLD: 6.17 K/UL — SIGNIFICANT CHANGE UP (ref 3.8–10.5)
WBC # FLD AUTO: 6.17 K/UL — SIGNIFICANT CHANGE UP (ref 3.8–10.5)

## 2022-10-12 PROCEDURE — 99233 SBSQ HOSP IP/OBS HIGH 50: CPT | Mod: 57

## 2022-10-12 PROCEDURE — 99231 SBSQ HOSP IP/OBS SF/LOW 25: CPT

## 2022-10-12 PROCEDURE — 71045 X-RAY EXAM CHEST 1 VIEW: CPT | Mod: 26

## 2022-10-12 RX ADMIN — Medication 650 MILLIGRAM(S): at 20:45

## 2022-10-12 RX ADMIN — SENNA PLUS 2 TABLET(S): 8.6 TABLET ORAL at 21:15

## 2022-10-12 RX ADMIN — Medication 650 MILLIGRAM(S): at 21:45

## 2022-10-12 RX ADMIN — HEPARIN SODIUM 5000 UNIT(S): 5000 INJECTION INTRAVENOUS; SUBCUTANEOUS at 06:26

## 2022-10-12 NOTE — PROGRESS NOTE ADULT - SUBJECTIVE AND OBJECTIVE BOX
CARDIOLOGY     PROGRESS  NOTE   ________________________________________________    CHIEF COMPLAINT:Patient is a 77y old  Female who presents with a chief complaint of sob (11 Oct 2022 13:53)  no complain.  	  REVIEW OF SYSTEMS:  CONSTITUTIONAL: No fever, weight loss, or fatigue  EYES: No eye pain, visual disturbances, or discharge  ENT:  No difficulty hearing, tinnitus, vertigo; No sinus or throat pain  NECK: No pain or stiffness  RESPIRATORY: No cough, wheezing, chills or hemoptysis; No Shortness of Breath  CARDIOVASCULAR: No chest pain, palpitations, passing out, dizziness, or leg swelling  GASTROINTESTINAL: No abdominal or epigastric pain. No nausea, vomiting, or hematemesis; No diarrhea or constipation. No melena or hematochezia.  GENITOURINARY: No dysuria, frequency, hematuria, or incontinence  NEUROLOGICAL: No headaches, memory loss, loss of strength, numbness, or tremors  SKIN: No itching, burning, rashes, or lesions   LYMPH Nodes: No enlarged glands  ENDOCRINE: No heat or cold intolerance; No hair loss  MUSCULOSKELETAL: No joint pain or swelling; No muscle, back, or extremity pain  PSYCHIATRIC: No depression, anxiety, mood swings, or difficulty sleeping  HEME/LYMPH: No easy bruising, or bleeding gums  ALLERGY AND IMMUNOLOGIC: No hives or eczema	    [x ] All others negative	  [ ] Unable to obtain    PHYSICAL EXAM:  T(C): 36.9 (10-12-22 @ 08:41), Max: 37.2 (10-11-22 @ 16:16)  HR: 99 (10-12-22 @ 08:41) (77 - 99)  BP: 112/63 (10-12-22 @ 08:41) (112/63 - 129/57)  RR: 18 (10-12-22 @ 08:41) (18 - 18)  SpO2: 95% (10-12-22 @ 08:41) (95% - 97%)  Wt(kg): --  I&O's Summary    11 Oct 2022 07:01  -  12 Oct 2022 07:00  --------------------------------------------------------  IN: 610 mL / OUT: 72 mL / NET: 538 mL        Appearance: Normal	  HEENT:   Normal oral mucosa, PERRL, EOMI	  Lymphatic: No lymphadenopathy  Cardiovascular: Normal S1 S2, No JVD, + murmurs, No edema  Respiratory: Lungs clear to auscultation	  Gastrointestinal:  Soft, Non-tender, + BS	  Skin: No rashes, No ecchymoses, No cyanosis	  Neurologic: Non-focal  Extremities: Normal range of motion, No clubbing, cyanosis or edema  Vascular: Peripheral pulses palpable 2+ bilaterally    MEDICATIONS  (STANDING):  amLODIPine   Tablet 5 milliGRAM(s) Oral daily  heparin   Injectable 5000 Unit(s) SubCutaneous every 12 hours  metoprolol succinate ER 25 milliGRAM(s) Oral daily  senna 2 Tablet(s) Oral at bedtime      TELEMETRY: 	    ECG:  	  RADIOLOGY:  OTHER: 	  	  LABS:	 	    CARDIAC MARKERS:                                9.9    6.17  )-----------( 176      ( 12 Oct 2022 07:41 )             31.2     10-12    140  |  103  |  15  ----------------------------<  122<H>  4.0   |  26  |  0.53    Ca    9.2      12 Oct 2022 07:35      proBNP: Serum Pro-Brain Natriuretic Peptide: 194 pg/mL (10-07 @ 14:12)    Lipid Profile:   HgA1c:   TSH:     Called by RN as patient refusing MRI of head to r/o mets, despite prior order for Xanex to be given before MRI.  Patient seen and examined at bedside. Patient adamantly refusing to take Xanex, states does not want to have MRI and prefers to go home.   Consider MRI with anesthesia, however, patient declining to have anesthesia for procedure as well.   Dr. Mratel aware.   MRI order discontinued.     Assessment and plan  ---------------------------  77 F no significant PMH presenting to ED with 1 month of SOB and cough. cough was productive now dry. Pt saw PCP 1 week ago for sx and had a CXR ordered which showed increased haziness throughout most of the left mid and lower thorax with small areas of residual aeration upper thorax, clear right lung. Pt has appointment with pulmonologist in 3 weeks but did not want to wait that long so came to ED today. Denies fever, HA, CP, abdominal pain, dysuria, N/V/D. Has not taken any medications for sx.  pt with sob and large pleural effusion ?sec to metastatic ca  s/p chest tube/ pulmonary/ thoracic eval and follow up  dvt prophylaxis  biopsy  htn will adjust meds, hold bp meds for now  increasing beta blocker  brain MRI r/o mets  will adjust bp med  echo noted small pericardial effusion  thoracic  surgery appreciated  awaiting MRI noted

## 2022-10-12 NOTE — PROGRESS NOTE ADULT - SUBJECTIVE AND OBJECTIVE BOX
Patient is a 77y old  Female who presents with a chief complaint of sob (12 Oct 2022 09:23)      SUBJECTIVE: " Hi, I have pain near the chest tube site. When can I go home"    Vital Signs Last 24 Hrs  T(C): 36.9 (10-12-22 @ 08:41), Max: 37.2 (10-11-22 @ 16:16)  T(F): 98.5 (10-12-22 @ 08:41), Max: 98.9 (10-11-22 @ 16:16)  HR: 99 (10-12-22 @ 08:41) (77 - 99)  BP: 112/63 (10-12-22 @ 08:41) (112/63 - 129/57)  RR: 18 (10-12-22 @ 08:41) (18 - 18)  SpO2: 95% (10-12-22 @ 08:41) (95% - 97%)                10-11-22 @ 07:01  -  10-12-22 @ 07:00  --------------------------------------------------------  IN: 610 mL / OUT: 72 mL / NET: 538 mL        Daily     Daily                           9.9    6.17  )-----------( 176      ( 12 Oct 2022 07:41 )             31.2     10-12    140  |  103  |  15  ----------------------------<  122<H>  4.0   |  26  |  0.53    Ca    9.2      12 Oct 2022 07:35            PHYSICAL EXAM  Neurology: A&Ox3, NAD, no gross deficits  CV : RRR+S1S2  Lungs: Respirations non-labored, B/L BS  Abdomen: Soft, NT/ND, +BSx4Q  Extremities: B/L LE edema, negative calf tenderness, +PP           CHEST TUBES:  Left CT     [  ]LWS  [ X ]H2O seal  Right CT   [  ]LWS  [  ]H2O seal              MEDICATIONS  acetaminophen     Tablet .. 650 milliGRAM(s) Oral every 6 hours PRN  ALPRAZolam 0.25 milliGRAM(s) Oral every 12 hours PRN  amLODIPine   Tablet 5 milliGRAM(s) Oral daily  heparin   Injectable 5000 Unit(s) SubCutaneous every 12 hours  HYDROmorphone  Injectable 0.5 milliGRAM(s) IV Push every 6 hours PRN  metoprolol succinate ER 25 milliGRAM(s) Oral daily  senna 2 Tablet(s) Oral at bedtime

## 2022-10-12 NOTE — PROGRESS NOTE ADULT - PROBLEM SELECTOR PLAN 1
keep Lt PTC  Pleurvac    h20  follow up cyto and pl cx  OOB to chair   \  for all meals to assist in drainage  recommend MRI head to r/o mass - refused   onc recs:  bx of mass  IR planning image guided ELYSE Mass biopsy on Thursday 10/13 keep Lt PTC  Pleurvac    h20  follow up cyto and pl cx  OOB to chair   \  for all meals to assist in drainage  recommend MRI head to r/o mass - refused   onc recs:  bx of mass  IR planning image guided ELYSE Mass biopsy on Thursday 10/13  WIll Remove chest tube after biopsy incase of PTX

## 2022-10-12 NOTE — PROGRESS NOTE ADULT - ASSESSMENT
77F     with h/o appendectomy, ex-smoker      presenting with cough x1 month   and mild  sob        found to have a large ELYSE and hilar lung tumor with associated large left pleural effusion.    ct c/a/p,  left hilar/ lung mas.   L  pl  effusion,  mediastinal l adenopathy,  pancreatic  mass small pericardial  effusion,    suspect metastatic lung cancer.  oncology dr jose     thoracic team  and Left chest tube  on 10/ 7,  follow  cytology  HTN, on toprol   cxr., no  pneumothorax    needs  biopsy/  IR eval  noted,  no  intervention, initially  on dvt  ppx   needs  EBUS/ bx/ discussed with  jorge albert.  will  f/p  today   mri  head,/  refused  by pt    per  dr boothe,  await    IR bx of lung mass   spoke  with son,  aware  that pt  refuses  tests  at  times     son  chaim/         ra< from: CT Abdomen and Pelvis w/ IV Cont (10.07.22 @ 16:41) >  IMPRESSION:  Large left hilar and left upper lobe tumor as described with confluent   multistation mediastinal involvement versus lymphadenopathy and moderate   to large loculated left pleural effusion is highly suspicious for small   cell lung carcinoma. Recommend biopsy to confirm diagnosis  Small pericardial effusion.  Heterogeneous mid to distal pancreatic body 4.7 cm mass, presumably   metastati  --- End of Report ---

## 2022-10-12 NOTE — PROGRESS NOTE ADULT - SUBJECTIVE AND OBJECTIVE BOX
[facility]  REVIEW OF SYSTEMS:  GEN: no fever,    no chills  RESP: no SOB,   no cough  CVS: no chest pain,   no palpitations  GI: no abdominal pain,   no nausea,   no vomiting,   no constipation,   no diarrhea  : no dysuria,   no frequency  NEURO: no headache,   no dizziness  PSYCH: no depression,   not anxious  Derm : no rash    MEDICATIONS  (STANDING):  amLODIPine   Tablet 5 milliGRAM(s) Oral daily  heparin   Injectable 5000 Unit(s) SubCutaneous every 12 hours  metoprolol succinate ER 25 milliGRAM(s) Oral daily  senna 2 Tablet(s) Oral at bedtime    MEDICATIONS  (PRN):  acetaminophen     Tablet .. 650 milliGRAM(s) Oral every 6 hours PRN Mild Pain (1 - 3), Moderate Pain (4 - 6)  ALPRAZolam 0.25 milliGRAM(s) Oral every 12 hours PRN anxiety  HYDROmorphone  Injectable 0.5 milliGRAM(s) IV Push every 6 hours PRN Moderate Pain (4 - 6)      Vital Signs Last 24 Hrs  T(C): 36.9 (12 Oct 2022 08:41), Max: 37.2 (11 Oct 2022 16:16)  T(F): 98.5 (12 Oct 2022 08:41), Max: 98.9 (11 Oct 2022 16:16)  HR: 99 (12 Oct 2022 08:41) (77 - 99)  BP: 112/63 (12 Oct 2022 08:41) (112/63 - 129/57)  BP(mean): --  RR: 18 (12 Oct 2022 08:41) (18 - 18)  SpO2: 95% (12 Oct 2022 08:41) (95% - 97%)    Parameters below as of 12 Oct 2022 08:41  Patient On (Oxygen Delivery Method): room air      CAPILLARY BLOOD GLUCOSE        I&O's Summary    11 Oct 2022 07:01  -  12 Oct 2022 07:00  --------------------------------------------------------  IN: 610 mL / OUT: 72 mL / NET: 538 mL        PHYSICAL EXAM:  HEAD:  Atraumatic, Normocephalic  NECK: Supple, No   JVD  CHEST/LUNG:   no     rales,     no,    rhonchi  HEART: Regular rate and rhythm;         murmur  ABDOMEN: Soft, Nontender, ;   EXTREMITIES: no       edema  NEUROLOGY:  alert    LABS:                        9.9    6.17  )-----------( 176      ( 12 Oct 2022 07:41 )             31.2     10-12    140  |  103  |  15  ----------------------------<  122<H>  4.0   |  26  |  0.53    Ca    9.2      12 Oct 2022 07:35                              Consultant(s) Notes Reviewed:      Care Discussed with Consultants/Other Providers:

## 2022-10-12 NOTE — PROGRESS NOTE ADULT - ASSESSMENT
77F with h/o appendectomy, ex-smoker presenting with cough x1 month found to have a large ELYSE and hilar lung tumor with associated large left pleural effusion. Currently hemodynamically stable and on room air.    L pigtail catheter at bedside   cytology and cultures from pleural flui  10/8   lt ptc   draining  lws  ambulated  10/9   lt pl tube draining  on water seal  10/11 L PTC - H2O with 90 output overnight  10/12 Patient Refused MRI of Brain to r/o mets. Refusing Xanax for anxiety. L PTC - H2O seal. Still pending Cytology.

## 2022-10-12 NOTE — PROGRESS NOTE ADULT - SUBJECTIVE AND OBJECTIVE BOX
77 F  no significant PMH admitted 10/7/22 with 1 month of cough. SOB if any was mild. No phlegm, hemoptysis. Had seen ENT for it  CXR ordered by PCP showed increased haziness throughout most of the left mid and lower thorax with small areas of residual aeration upper thorax, clear right lung.   Ex smoker. No weight loss etc    PAST MEDICAL & SURGICAL HISTORY:  No pertinent past medical history      No significant past surgical history        Allergies    No Known Allergies    Intolerances      Social History:    Medications:  acetaminophen     Tablet .. 650 milliGRAM(s) Oral every 6 hours PRN Mild Pain (1 - 3), Moderate Pain (4 - 6)  ALPRAZolam 0.25 milliGRAM(s) Oral every 12 hours PRN anxiety  amLODIPine   Tablet 5 milliGRAM(s) Oral daily  heparin   Injectable 5000 Unit(s) SubCutaneous every 12 hours  HYDROmorphone  Injectable 0.5 milliGRAM(s) IV Push every 6 hours PRN Moderate Pain (4 - 6)  metoprolol succinate ER 25 milliGRAM(s) Oral daily  senna 2 Tablet(s) Oral at bedtime    Labs:  CBC Full  -  ( 12 Oct 2022 07:41 )  WBC Count : 6.17 K/uL  RBC Count : 5.37 M/uL  Hemoglobin : 9.9 g/dL  Hematocrit : 31.2 %  Platelet Count - Automated : 176 K/uL  Mean Cell Volume : 58.1 fl  Mean Cell Hemoglobin : 18.4 pg  Mean Cell Hemoglobin Concentration : 31.7 gm/dL  Auto Neutrophil # : x  Auto Lymphocyte # : x  Auto Monocyte # : x  Auto Eosinophil # : x  Auto Basophil # : x  Auto Neutrophil % : x  Auto Lymphocyte % : x  Auto Monocyte % : x  Auto Eosinophil % : x  Auto Basophil % : x    10-12    140  |  103  |  15  ----------------------------<  122<H>  4.0   |  26  |  0.53    Ca    9.2      12 Oct 2022 07:35        Radiology:             ROS:  Patient comfortable without distress  No SOB or chest pain  No palpitation  No abdominal pain, diarrhaea or constipation  No weakness of extremities  No skin changes or swelling of legs  Rest of the comprehensive ROS was negative  Vital Signs Last 24 Hrs  T(C): 36.9 (12 Oct 2022 08:41), Max: 37.2 (11 Oct 2022 16:16)  T(F): 98.5 (12 Oct 2022 08:41), Max: 98.9 (11 Oct 2022 16:16)  HR: 99 (12 Oct 2022 08:41) (77 - 99)  BP: 112/63 (12 Oct 2022 08:41) (112/63 - 129/57)  BP(mean): --  RR: 18 (12 Oct 2022 08:41) (18 - 18)  SpO2: 95% (12 Oct 2022 08:41) (95% - 97%)    Parameters below as of 12 Oct 2022 08:41  Patient On (Oxygen Delivery Method): room air        Physical exam:  Patient alert and oriented  No distress  CVS: S1, S2   Chest: bilateral breath sound without rales. Chest tube on left side  Abdomen: soft, not tender, no organomegaly or masses  CNS: No focal neuro deficit  Musculoskeletal:  Normal range of motion  Skin: No rash    Assessment and Plan:

## 2022-10-12 NOTE — PROGRESS NOTE ADULT - ASSESSMENT
77 F  no significant PMH admitted 10/7/22 with 1 month of cough. SOB if any was mild. No phlegm, hemoptysis. Had seen ENT for it  CXR ordered by PCP showed increased haziness throughout most of the left mid and lower thorax with small areas of residual aeration upper thorax, clear right lung.   Ex smoker. No weight loss etc  IMPRESSION: CT's    Large left hilar and left upper lobe tumor as described with confluent   multistation mediastinal involvement versus lymphadenopathy and moderate   to large loculated left pleural effusion is highly suspicious for small   cell lung carcinoma. Recommend biopsy to confirm diagnosis    Small pericardial effusion.    Heterogeneous mid to distal pancreatic body 4.7 cm mass, presumably   metastatic  Patient has left chest tube, cytology etc is pending.  IR has seen patient considering image guided ELYSE mass bx pending cytology and Thoracic surgery is following.     Clinically has extensive stage SCLC or metastatic NSCLC.  Management to be decided with after pathology is back.   MRI brain has been refused by patient

## 2022-10-13 ENCOUNTER — RESULT REVIEW (OUTPATIENT)
Age: 77
End: 2022-10-13

## 2022-10-13 LAB
ANION GAP SERPL CALC-SCNC: 9 MMOL/L — SIGNIFICANT CHANGE UP (ref 5–17)
APTT BLD: 27.7 SEC — SIGNIFICANT CHANGE UP (ref 27.5–35.5)
BLD GP AB SCN SERPL QL: NEGATIVE — SIGNIFICANT CHANGE UP
BUN SERPL-MCNC: 17 MG/DL — SIGNIFICANT CHANGE UP (ref 7–23)
CALCIUM SERPL-MCNC: 8.8 MG/DL — SIGNIFICANT CHANGE UP (ref 8.4–10.5)
CHLORIDE SERPL-SCNC: 102 MMOL/L — SIGNIFICANT CHANGE UP (ref 96–108)
CO2 SERPL-SCNC: 27 MMOL/L — SIGNIFICANT CHANGE UP (ref 22–31)
CREAT SERPL-MCNC: 0.51 MG/DL — SIGNIFICANT CHANGE UP (ref 0.5–1.3)
CULTURE RESULTS: SIGNIFICANT CHANGE UP
EGFR: 95 ML/MIN/1.73M2 — SIGNIFICANT CHANGE UP
GLUCOSE SERPL-MCNC: 113 MG/DL — HIGH (ref 70–99)
HCT VFR BLD CALC: 30.5 % — LOW (ref 34.5–45)
HGB BLD-MCNC: 9.4 G/DL — LOW (ref 11.5–15.5)
INR BLD: 1.14 RATIO — SIGNIFICANT CHANGE UP (ref 0.88–1.16)
MCHC RBC-ENTMCNC: 18.1 PG — LOW (ref 27–34)
MCHC RBC-ENTMCNC: 30.8 GM/DL — LOW (ref 32–36)
MCV RBC AUTO: 58.8 FL — LOW (ref 80–100)
NRBC # BLD: 0 /100 WBCS — SIGNIFICANT CHANGE UP (ref 0–0)
PLATELET # BLD AUTO: 170 K/UL — SIGNIFICANT CHANGE UP (ref 150–400)
POTASSIUM SERPL-MCNC: 3.9 MMOL/L — SIGNIFICANT CHANGE UP (ref 3.5–5.3)
POTASSIUM SERPL-SCNC: 3.9 MMOL/L — SIGNIFICANT CHANGE UP (ref 3.5–5.3)
PROTHROM AB SERPL-ACNC: 13.1 SEC — SIGNIFICANT CHANGE UP (ref 10.5–13.4)
RBC # BLD: 5.19 M/UL — SIGNIFICANT CHANGE UP (ref 3.8–5.2)
RBC # FLD: 15.7 % — HIGH (ref 10.3–14.5)
RH IG SCN BLD-IMP: POSITIVE — SIGNIFICANT CHANGE UP
SODIUM SERPL-SCNC: 138 MMOL/L — SIGNIFICANT CHANGE UP (ref 135–145)
SPECIMEN SOURCE: SIGNIFICANT CHANGE UP
WBC # BLD: 6.05 K/UL — SIGNIFICANT CHANGE UP (ref 3.8–10.5)
WBC # FLD AUTO: 6.05 K/UL — SIGNIFICANT CHANGE UP (ref 3.8–10.5)

## 2022-10-13 PROCEDURE — 88341 IMHCHEM/IMCYTCHM EA ADD ANTB: CPT | Mod: 26,59

## 2022-10-13 PROCEDURE — 88305 TISSUE EXAM BY PATHOLOGIST: CPT | Mod: 26

## 2022-10-13 PROCEDURE — 71045 X-RAY EXAM CHEST 1 VIEW: CPT | Mod: 26

## 2022-10-13 PROCEDURE — 99233 SBSQ HOSP IP/OBS HIGH 50: CPT | Mod: 57

## 2022-10-13 PROCEDURE — 32408 CORE NDL BX LNG/MED PERQ: CPT

## 2022-10-13 PROCEDURE — 88360 TUMOR IMMUNOHISTOCHEM/MANUAL: CPT | Mod: 26

## 2022-10-13 PROCEDURE — 71045 X-RAY EXAM CHEST 1 VIEW: CPT | Mod: 26,77

## 2022-10-13 PROCEDURE — 99231 SBSQ HOSP IP/OBS SF/LOW 25: CPT

## 2022-10-13 PROCEDURE — 88173 CYTOPATH EVAL FNA REPORT: CPT | Mod: 26

## 2022-10-13 PROCEDURE — 88342 IMHCHEM/IMCYTCHM 1ST ANTB: CPT | Mod: 26,59

## 2022-10-13 RX ORDER — ACETAMINOPHEN 500 MG
650 TABLET ORAL ONCE
Refills: 0 | Status: DISCONTINUED | OUTPATIENT
Start: 2022-10-13 | End: 2022-10-14

## 2022-10-13 RX ADMIN — SENNA PLUS 2 TABLET(S): 8.6 TABLET ORAL at 21:40

## 2022-10-13 RX ADMIN — Medication 650 MILLIGRAM(S): at 22:40

## 2022-10-13 RX ADMIN — Medication 25 MILLIGRAM(S): at 05:02

## 2022-10-13 RX ADMIN — Medication 650 MILLIGRAM(S): at 21:39

## 2022-10-13 RX ADMIN — AMLODIPINE BESYLATE 5 MILLIGRAM(S): 2.5 TABLET ORAL at 05:02

## 2022-10-13 NOTE — PRE-ANESTHESIA EVALUATION ADULT - NSANTHPMHFT_GEN_ALL_CORE
PT with h/o HTN, ex -smoker, presenting with SOB found to have pleural effusion and left lung mass. Pt was admitted, CT for effusion, improving SOB. Pt subjectively improved, cannot lie flat. As per CT, pt with large left thoracic mass, Left main bronchus severely narrowed, Left upper bronchus obliterated.

## 2022-10-13 NOTE — PROGRESS NOTE ADULT - SUBJECTIVE AND OBJECTIVE BOX
CARDIOLOGY     PROGRESS  NOTE   ________________________________________________    CHIEF COMPLAINT:Patient is a 77y old  Female who presents with a chief complaint of sob (12 Oct 2022 10:54)  no complain.  	  REVIEW OF SYSTEMS:  CONSTITUTIONAL: No fever, weight loss, or fatigue  EYES: No eye pain, visual disturbances, or discharge  ENT:  No difficulty hearing, tinnitus, vertigo; No sinus or throat pain  NECK: No pain or stiffness  RESPIRATORY: No cough, wheezing, chills or hemoptysis; No Shortness of Breath  CARDIOVASCULAR: No chest pain, palpitations, passing out, dizziness, or leg swelling  GASTROINTESTINAL: No abdominal or epigastric pain. No nausea, vomiting, or hematemesis; No diarrhea or constipation. No melena or hematochezia.  GENITOURINARY: No dysuria, frequency, hematuria, or incontinence  NEUROLOGICAL: No headaches, memory loss, loss of strength, numbness, or tremors  SKIN: No itching, burning, rashes, or lesions   LYMPH Nodes: No enlarged glands  ENDOCRINE: No heat or cold intolerance; No hair loss  MUSCULOSKELETAL: No joint pain or swelling; No muscle, back, or extremity pain  PSYCHIATRIC: No depression, anxiety, mood swings, or difficulty sleeping  HEME/LYMPH: No easy bruising, or bleeding gums  ALLERGY AND IMMUNOLOGIC: No hives or eczema	    [ ] All others negative	  [ ] Unable to obtain    PHYSICAL EXAM:  T(C): 36.6 (10-13-22 @ 05:02), Max: 36.9 (10-12-22 @ 13:18)  HR: 79 (10-13-22 @ 05:02) (79 - 100)  BP: 118/74 (10-13-22 @ 05:02) (107/68 - 124/72)  RR: 18 (10-13-22 @ 05:02) (18 - 18)  SpO2: 95% (10-13-22 @ 05:02) (95% - 96%)  Wt(kg): --  I&O's Summary    12 Oct 2022 07:01  -  13 Oct 2022 07:00  --------------------------------------------------------  IN: 790 mL / OUT: 110 mL / NET: 680 mL        Appearance: Normal	  HEENT:   Normal oral mucosa, PERRL, EOMI	  Lymphatic: No lymphadenopathy  Cardiovascular: Normal S1 S2, No JVD, +murmurs, No edema  Respiratory: Lungs clear to auscultation	  Psychiatry: A & O x 3, Mood & affect appropriate  Gastrointestinal:  Soft, Non-tender, + BS	  Skin: No rashes, No ecchymoses, No cyanosis	  Neurologic: Non-focal  Extremities: Normal range of motion, No clubbing, cyanosis or edema  Vascular: Peripheral pulses palpable 2+ bilaterally    MEDICATIONS  (STANDING):  amLODIPine   Tablet 5 milliGRAM(s) Oral daily  metoprolol succinate ER 25 milliGRAM(s) Oral daily  senna 2 Tablet(s) Oral at bedtime      TELEMETRY: 	    ECG:  	  RADIOLOGY:  OTHER: 	  	  LABS:	 	    CARDIAC MARKERS:                                9.4    6.05  )-----------( 170      ( 13 Oct 2022 05:32 )             30.5     10-13    138  |  102  |  17  ----------------------------<  113<H>  3.9   |  27  |  0.51    Ca    8.8      13 Oct 2022 05:32      proBNP: Serum Pro-Brain Natriuretic Peptide: 194 pg/mL (10-07 @ 14:12)    Lipid Profile:   HgA1c:   TSH:   PT/INR - ( 13 Oct 2022 05:32 )   PT: 13.1 sec;   INR: 1.14 ratio         PTT - ( 13 Oct 2022 05:32 )  PTT:27.7 sec    ·  Problem: Pleural effusion, left.   ·  Plan: keep Lt PTC  Pleurvac    h20  follow up cyto and pl cx  OOB to chair    for all meals to assist in drainage  recommend MRI head to r/o mass - refused   onc recs:  bx of mass  IR planning image guided ELYSE Mass biopsy on Thursday 10/13  WIll Remove chest tube after biopsy incase of PTX.    Assessment and plan  ---------------------------  77 F no significant PMH presenting to ED with 1 month of SOB and cough. cough was productive now dry. Pt saw PCP 1 week ago for sx and had a CXR ordered which showed increased haziness throughout most of the left mid and lower thorax with small areas of residual aeration upper thorax, clear right lung. Pt has appointment with pulmonologist in 3 weeks but did not want to wait that long so came to ED today. Denies fever, HA, CP, abdominal pain, dysuria, N/V/D. Has not taken any medications for sx.  pt with sob and large pleural effusion ?sec to metastatic ca  s/p chest tube/ pulmonary/ thoracic eval and follow up  dvt prophylaxis  biopsy  htn will adjust meds, hold bp meds for now  increasing beta blocker  brain MRI r/o mets  will adjust bp med  echo noted small pericardial effusion  thoracic  surgery appreciated  awaiting biopsy  high risk for dvt, needs dvt prophylaxis

## 2022-10-13 NOTE — PROGRESS NOTE ADULT - SUBJECTIVE AND OBJECTIVE BOX
[facility]    REVIEW OF SYSTEMS:  GEN: no fever,    no chills  RESP: no SOB,   no cough  CVS: no chest pain,   no palpitations  GI: no abdominal pain,   no nausea,   no vomiting,   no constipation,   no diarrhea  : no dysuria,   no frequency  NEURO: no headache,   no dizziness  PSYCH: no depression,   not anxious  Derm : no rash    MEDICATIONS  (STANDING):  amLODIPine   Tablet 5 milliGRAM(s) Oral daily  metoprolol succinate ER 25 milliGRAM(s) Oral daily  senna 2 Tablet(s) Oral at bedtime    MEDICATIONS  (PRN):  acetaminophen     Tablet .. 650 milliGRAM(s) Oral every 6 hours PRN Mild Pain (1 - 3), Moderate Pain (4 - 6)  ALPRAZolam 0.25 milliGRAM(s) Oral every 12 hours PRN anxiety  HYDROmorphone  Injectable 0.5 milliGRAM(s) IV Push every 6 hours PRN Moderate Pain (4 - 6)      Vital Signs Last 24 Hrs  T(C): 36.6 (13 Oct 2022 05:02), Max: 36.9 (12 Oct 2022 13:18)  T(F): 97.8 (13 Oct 2022 05:02), Max: 98.4 (12 Oct 2022 13:18)  HR: 79 (13 Oct 2022 05:02) (79 - 100)  BP: 118/74 (13 Oct 2022 05:02) (107/68 - 124/72)  BP(mean): --  RR: 18 (13 Oct 2022 05:02) (18 - 18)  SpO2: 95% (13 Oct 2022 05:02) (95% - 96%)    Parameters below as of 13 Oct 2022 05:02  Patient On (Oxygen Delivery Method): room air      CAPILLARY BLOOD GLUCOSE        I&O's Summary    12 Oct 2022 07:01  -  13 Oct 2022 07:00  --------------------------------------------------------  IN: 790 mL / OUT: 110 mL / NET: 680 mL        PHYSICAL EXAM:  HEAD:  Atraumatic, Normocephalic  NECK: Supple, No   JVD  CHEST/LUNG:   no     rales,     no,    rhonchi  HEART: Regular rate and rhythm;         murmur  ABDOMEN: Soft, Nontender, ;   EXTREMITIES:    no    edema  NEUROLOGY:  alert    LABS:                        9.4    6.05  )-----------( 170      ( 13 Oct 2022 05:32 )             30.5     10-13    138  |  102  |  17  ----------------------------<  113<H>  3.9   |  27  |  0.51    Ca    8.8      13 Oct 2022 05:32      PT/INR - ( 13 Oct 2022 05:32 )   PT: 13.1 sec;   INR: 1.14 ratio         PTT - ( 13 Oct 2022 05:32 )  PTT:27.7 sec                        Consultant(s) Notes Reviewed:      Care Discussed with Consultants/Other Providers:

## 2022-10-13 NOTE — PROGRESS NOTE ADULT - SUBJECTIVE AND OBJECTIVE BOX
77 F  no significant PMH admitted 10/7/22 with 1 month of cough. SOB if any was mild. No phlegm, hemoptysis. Had seen ENT for it  CXR ordered by PCP showed increased haziness throughout most of the left mid and lower thorax with small areas of residual aeration upper thorax, clear right lung.   Ex smoker. No weight loss etc      PAST MEDICAL & SURGICAL HISTORY:  No pertinent past medical history      No significant past surgical history        Allergies    No Known Allergies    Intolerances      Social History:    Medications:  acetaminophen     Tablet .. 650 milliGRAM(s) Oral every 6 hours PRN Mild Pain (1 - 3), Moderate Pain (4 - 6)  ALPRAZolam 0.25 milliGRAM(s) Oral every 12 hours PRN anxiety  amLODIPine   Tablet 5 milliGRAM(s) Oral daily  HYDROmorphone  Injectable 0.5 milliGRAM(s) IV Push every 6 hours PRN Moderate Pain (4 - 6)  metoprolol succinate ER 25 milliGRAM(s) Oral daily  senna 2 Tablet(s) Oral at bedtime    Labs:  CBC Full  -  ( 13 Oct 2022 05:32 )  WBC Count : 6.05 K/uL  RBC Count : 5.19 M/uL  Hemoglobin : 9.4 g/dL  Hematocrit : 30.5 %  Platelet Count - Automated : 170 K/uL  Mean Cell Volume : 58.8 fl  Mean Cell Hemoglobin : 18.1 pg  Mean Cell Hemoglobin Concentration : 30.8 gm/dL  Auto Neutrophil # : x  Auto Lymphocyte # : x  Auto Monocyte # : x  Auto Eosinophil # : x  Auto Basophil # : x  Auto Neutrophil % : x  Auto Lymphocyte % : x  Auto Monocyte % : x  Auto Eosinophil % : x  Auto Basophil % : x    10-13    138  |  102  |  17  ----------------------------<  113<H>  3.9   |  27  |  0.51    Ca    8.8      13 Oct 2022 05:32    Specimen(s) Submitted   1. PLEURAL FLUID, LEFT   2. PLEURAL FLUID, LEFT   Final Diagnosis   1. PLEURAL FLUID, LEFT   NEGATIVE FOR MALIGNANT CELLS.     Radiology:             ROS:  Patient comfortable without distress  No SOB or chest pain  No palpitation  No abdominal pain, diarrhaea or constipation  No weakness of extremities  No skin changes or swelling of legs  Rest of the comprehensive ROS was negative  Vital Signs Last 24 Hrs  T(C): 36.6 (13 Oct 2022 05:02), Max: 36.9 (12 Oct 2022 13:18)  T(F): 97.8 (13 Oct 2022 05:02), Max: 98.4 (12 Oct 2022 13:18)  HR: 79 (13 Oct 2022 05:02) (79 - 100)  BP: 118/74 (13 Oct 2022 05:02) (107/68 - 124/72)  BP(mean): --  RR: 18 (13 Oct 2022 05:02) (18 - 18)  SpO2: 95% (13 Oct 2022 05:02) (95% - 96%)    Parameters below as of 13 Oct 2022 05:02  Patient On (Oxygen Delivery Method): room air        Physical exam:  Patient alert and oriented  No distress  CVS: S1, S2 regular or murmur  Chest: bilateral breath sound without rales, left chest tube  Abdomen: soft, not tender, no organomegaly or masses  CNS: No focal neuro deficit  Musculoskeletal:  Normal range of motion  Skin: No rash    Assessment and Plan: 77 F  no significant PMH admitted 10/7/22 with 1 month of cough. SOB if any was mild. No phlegm, hemoptysis. Had seen ENT for it  CXR ordered by PCP showed increased haziness throughout most of the left mid and lower thorax with small areas of residual aeration upper thorax, clear right lung.   Ex smoker. No weight loss etc      PAST MEDICAL & SURGICAL HISTORY:  No pertinent past medical history      No significant past surgical history        Allergies    No Known Allergies    Intolerances      Social History:    Medications:  acetaminophen     Tablet .. 650 milliGRAM(s) Oral every 6 hours PRN Mild Pain (1 - 3), Moderate Pain (4 - 6)  ALPRAZolam 0.25 milliGRAM(s) Oral every 12 hours PRN anxiety  amLODIPine   Tablet 5 milliGRAM(s) Oral daily  HYDROmorphone  Injectable 0.5 milliGRAM(s) IV Push every 6 hours PRN Moderate Pain (4 - 6)  metoprolol succinate ER 25 milliGRAM(s) Oral daily  senna 2 Tablet(s) Oral at bedtime    Labs:  CBC Full  -  ( 13 Oct 2022 05:32 )  WBC Count : 6.05 K/uL  RBC Count : 5.19 M/uL  Hemoglobin : 9.4 g/dL  Hematocrit : 30.5 %  Platelet Count - Automated : 170 K/uL  Mean Cell Volume : 58.8 fl  Mean Cell Hemoglobin : 18.1 pg  Mean Cell Hemoglobin Concentration : 30.8 gm/dL  Auto Neutrophil # : x  Auto Lymphocyte # : x  Auto Monocyte # : x  Auto Eosinophil # : x  Auto Basophil # : x  Auto Neutrophil % : x  Auto Lymphocyte % : x  Auto Monocyte % : x  Auto Eosinophil % : x  Auto Basophil % : x    10-13    138  |  102  |  17  ----------------------------<  113<H>  3.9   |  27  |  0.51    Ca    8.8      13 Oct 2022 05:32    Specimen(s) Submitted   1. PLEURAL FLUID, LEFT   2. PLEURAL FLUID, LEFT   Final Diagnosis   1. PLEURAL FLUID, LEFT   NEGATIVE FOR MALIGNANT CELLS.     Radiology:             ROS:  Patient comfortable without distress  No SOB or chest pain  No palpitation  No abdominal pain, diarrhaea or constipation  No weakness of extremities  No skin changes or swelling of legs  Rest of the comprehensive ROS was negative  Vital Signs Last 24 Hrs  T(C): 36.6 (13 Oct 2022 05:02), Max: 36.9 (12 Oct 2022 13:18)  T(F): 97.8 (13 Oct 2022 05:02), Max: 98.4 (12 Oct 2022 13:18)  HR: 79 (13 Oct 2022 05:02) (79 - 100)  BP: 118/74 (13 Oct 2022 05:02) (107/68 - 124/72)  BP(mean): --  RR: 18 (13 Oct 2022 05:02) (18 - 18)  SpO2: 95% (13 Oct 2022 05:02) (95% - 96%)    Parameters below as of 13 Oct 2022 05:02  Patient On (Oxygen Delivery Method): room air        Physical exam:  Patient alert and oriented  No distress  CVS: S1, S2  Chest: bilateral breath sound without rales, left chest tube  Abdomen: soft, not tender, no organomegaly or masses  CNS: No focal neuro deficit  Musculoskeletal:  Normal range of motion  Skin: No rash    Assessment and Plan:

## 2022-10-13 NOTE — PROGRESS NOTE ADULT - SUBJECTIVE AND OBJECTIVE BOX
Subjective: Pt states" " Denies any CP, SOB, palpitations. No acute events overnight.    Vital Signs:  Vital Signs Last 24 Hrs  T(C): 36.6 (10-13-22 @ 05:02), Max: 36.9 (10-12-22 @ 13:18)  T(F): 97.8 (10-13-22 @ 05:02), Max: 98.4 (10-12-22 @ 13:18)  HR: 79 (10-13-22 @ 05:02) (79 - 100)  BP: 118/74 (10-13-22 @ 05:02) (107/68 - 124/72)  RR: 18 (10-13-22 @ 05:02) (18 - 18)  SpO2: 95% (10-13-22 @ 05:02) (95% - 96%) on (O2)        Relevant labs, radiology and Medications reviewed                        9.4    6.05  )-----------( 170      ( 13 Oct 2022 05:32 )             30.5     10-13    138  |  102  |  17  ----------------------------<  113<H>  3.9   |  27  |  0.51    Ca    8.8      13 Oct 2022 05:32      PT/INR - ( 13 Oct 2022 05:32 )   PT: 13.1 sec;   INR: 1.14 ratio         PTT - ( 13 Oct 2022 05:32 )  PTT:27.7 sec  MEDICATIONS  (STANDING):  amLODIPine   Tablet 5 milliGRAM(s) Oral daily  metoprolol succinate ER 25 milliGRAM(s) Oral daily  senna 2 Tablet(s) Oral at bedtime    MEDICATIONS  (PRN):  acetaminophen     Tablet .. 650 milliGRAM(s) Oral every 6 hours PRN Mild Pain (1 - 3), Moderate Pain (4 - 6)  ALPRAZolam 0.25 milliGRAM(s) Oral every 12 hours PRN anxiety  HYDROmorphone  Injectable 0.5 milliGRAM(s) IV Push every 6 hours PRN Moderate Pain (4 - 6)      I&O's Summary    12 Oct 2022 07:01  -  13 Oct 2022 07:00  --------------------------------------------------------  IN: 790 mL / OUT: 110 mL / NET: 680 mL        IMAGING reviewed by attending    CXR:    CT Chest:    PAST MEDICAL & SURGICAL HISTORY:  No pertinent past medical history      No significant past surgical history           Physical Exam:  Neurology: A&Ox3, nonfocal, LUCIANO x 4, NAD  Respiratory: B/L BS CTA, diminished at bases, No wheezing, rales, rhonchi  CV: RRR, S1S2

## 2022-10-13 NOTE — PRE-ANESTHESIA EVALUATION ADULT - NSANTHOSAYNRD_GEN_A_CORE
No. PARVEZ screening performed.  STOP BANG Legend: 0-2 = LOW Risk; 3-4 = INTERMEDIATE Risk; 5-8 = HIGH Risk

## 2022-10-13 NOTE — PROGRESS NOTE ADULT - ASSESSMENT
77 F  no significant PMH admitted 10/7/22 with 1 month of cough. SOB if any was mild. No phlegm, hemoptysis. Had seen ENT for it  CXR ordered by PCP showed increased haziness throughout most of the left mid and lower thorax with small areas of residual aeration upper thorax, clear right lung.   Ex smoker. No weight loss etc  IMPRESSION: CT's    Large left hilar and left upper lobe tumor as described with confluent   multistation mediastinal involvement versus lymphadenopathy and moderate   to large loculated left pleural effusion is highly suspicious for small   cell lung carcinoma. Recommend biopsy to confirm diagnosis    Small pericardial effusion.    Heterogeneous mid to distal pancreatic body 4.7 cm mass, presumably   metastatic  Patient has left chest tube, cytology is negative for malignant cells  IR has seen patient, image guided ELYSE mass bx planned for today, 10/13/22    Clinically has extensive stage SCLC or metastatic NSCLC.  Management to be decided with after pathology is back.   MRI brain has been refused by patient

## 2022-10-13 NOTE — PROGRESS NOTE ADULT - PROBLEM SELECTOR PLAN 1
keep Lt PTC  Pleurvac    h20  follow up cyto and pl cx  OOB to chair   \  for all meals to assist in drainage  recommend MRI head to r/o mass - refused   onc recs:  bx of mass  IR planning image guided ELYSE Mass biopsy today with IR  WIll Remove chest tube after biopsy

## 2022-10-13 NOTE — PRE PROCEDURE NOTE - PRE PROCEDURE EVALUATION
Interventional Radiology    HPI: 78 yo female ex-smoker p/w cough found to have a large left hilarity and left upper lobe tumor with confluent multi station mediastinal involvement and large located left pleural effusion s/p chest tube placement. presents to IR for left upper lobe lung mass biopsy.    Allergies: NKDA  Medications (Abx/Cardiac/Anticoagulation/Blood Products)  amLODIPine   Tablet: 5 milliGRAM(s) Oral (10-13 @ 05:02)  heparin   Injectable: 5000 Unit(s) SubCutaneous (10-12 @ 06:26)  metoprolol succinate ER: 25 milliGRAM(s) Oral (10-13 @ 05:02)    Data:    T(C): 36.7  HR: 86  BP: 118/56  RR: 18  SpO2: 94%    Exam  General: No acute distress  Chest: Non labored breathing  Abdomen: Non-distended  Extremities: No swelling, warm    -WBC 6.05 / HgB 9.4 / Hct 30.5 / Plt 170  -Na 138 / Cl 102 / BUN 17 / Glucose 113  -K 3.9 / CO2 27 / Cr 0.51  -ALT -- / Alk Phos -- / T.Bili --  -INR1.14    Imaging: reviewed    Plan: 77y Female presents for left upper lobe lung mass biopsy.  -Risks/Benefits/alternatives explained with the patient and/or healthcare proxy and witnessed informed consent obtained.

## 2022-10-13 NOTE — PROGRESS NOTE ADULT - ASSESSMENT
77F with h/o appendectomy, ex-smoker presenting with cough x1 month found to have a large ELYSE and hilar lung tumor with associated large left pleural effusion. Currently hemodynamically stable and on room air.    L pigtail catheter at bedside   cytology and cultures from pleural flui  10/8   lt ptc   draining  lws  ambulated  10/9   lt pl tube draining  on water seal  10/11 L PTC - H2O with 90 output overnight  10/12 Patient Refused MRI of Brain to r/o mets. Refusing Xanax for anxiety. L PTC - H2O seal. Still pending Cytology.   10/13: for image guided ELYSE mass biopsy with IR today.

## 2022-10-13 NOTE — PROGRESS NOTE ADULT - ASSESSMENT
77F     with h/o appendectomy, ex-smoker      presenting with cough x1 month   and mild  sob        found to have a large ELYSE and hilar lung tumor with associated large left pleural effusion.    ct c/a/p,  left hilar/ lung mas.   L  pl  effusion,  mediastinal l adenopathy,  pancreatic  mass small pericardial  effusion,    suspect metastatic lung cancer.  oncology dr jose     thoracic team  and Left chest tube  on 10/ 7,  follow  cytology  HTN, on toprol   cxr., no  pneumothorax    needs  biopsy/  IR eval  noted,  no  intervention, initially  on dvt  ppx   needs  EBUS/ bx/ discussed with  jorge albert.  will  f/p  today   mri  head,/  refused  by pt    per  dr boothe,  await    IR bx of lung mass   spoke  with son,/  bx on thursday     son  chaim/  [phone]       ra< from: CT Abdomen and Pelvis w/ IV Cont (10.07.22 @ 16:41) >  IMPRESSION:  Large left hilar and left upper lobe tumor as described with confluent   multistation mediastinal involvement versus lymphadenopathy and moderate   to large loculated left pleural effusion is highly suspicious for small   cell lung carcinoma. Recommend biopsy to confirm diagnosis  Small pericardial effusion.  Heterogeneous mid to distal pancreatic body 4.7 cm mass, presumably   metastati  --- End of Report ---

## 2022-10-14 ENCOUNTER — TRANSCRIPTION ENCOUNTER (OUTPATIENT)
Age: 77
End: 2022-10-14

## 2022-10-14 VITALS
OXYGEN SATURATION: 96 % | HEART RATE: 86 BPM | TEMPERATURE: 98 F | RESPIRATION RATE: 18 BRPM | SYSTOLIC BLOOD PRESSURE: 119 MMHG | DIASTOLIC BLOOD PRESSURE: 60 MMHG

## 2022-10-14 LAB
ANION GAP SERPL CALC-SCNC: 11 MMOL/L — SIGNIFICANT CHANGE UP (ref 5–17)
BUN SERPL-MCNC: 17 MG/DL — SIGNIFICANT CHANGE UP (ref 7–23)
CALCIUM SERPL-MCNC: 9.1 MG/DL — SIGNIFICANT CHANGE UP (ref 8.4–10.5)
CHLORIDE SERPL-SCNC: 100 MMOL/L — SIGNIFICANT CHANGE UP (ref 96–108)
CO2 SERPL-SCNC: 26 MMOL/L — SIGNIFICANT CHANGE UP (ref 22–31)
CREAT SERPL-MCNC: 0.57 MG/DL — SIGNIFICANT CHANGE UP (ref 0.5–1.3)
EGFR: 93 ML/MIN/1.73M2 — SIGNIFICANT CHANGE UP
GLUCOSE SERPL-MCNC: 118 MG/DL — HIGH (ref 70–99)
HCT VFR BLD CALC: 33.5 % — LOW (ref 34.5–45)
HGB BLD-MCNC: 10.3 G/DL — LOW (ref 11.5–15.5)
MCHC RBC-ENTMCNC: 18 PG — LOW (ref 27–34)
MCHC RBC-ENTMCNC: 30.7 GM/DL — LOW (ref 32–36)
MCV RBC AUTO: 58.7 FL — LOW (ref 80–100)
NRBC # BLD: 0 /100 WBCS — SIGNIFICANT CHANGE UP (ref 0–0)
PLATELET # BLD AUTO: 175 K/UL — SIGNIFICANT CHANGE UP (ref 150–400)
POTASSIUM SERPL-MCNC: 4.1 MMOL/L — SIGNIFICANT CHANGE UP (ref 3.5–5.3)
POTASSIUM SERPL-SCNC: 4.1 MMOL/L — SIGNIFICANT CHANGE UP (ref 3.5–5.3)
RBC # BLD: 5.71 M/UL — HIGH (ref 3.8–5.2)
RBC # FLD: 15.7 % — HIGH (ref 10.3–14.5)
SODIUM SERPL-SCNC: 137 MMOL/L — SIGNIFICANT CHANGE UP (ref 135–145)
WBC # BLD: 6.62 K/UL — SIGNIFICANT CHANGE UP (ref 3.8–10.5)
WBC # FLD AUTO: 6.62 K/UL — SIGNIFICANT CHANGE UP (ref 3.8–10.5)

## 2022-10-14 PROCEDURE — 99231 SBSQ HOSP IP/OBS SF/LOW 25: CPT

## 2022-10-14 PROCEDURE — 71045 X-RAY EXAM CHEST 1 VIEW: CPT | Mod: 26,76

## 2022-10-14 PROCEDURE — 99233 SBSQ HOSP IP/OBS HIGH 50: CPT | Mod: 57

## 2022-10-14 PROCEDURE — 71045 X-RAY EXAM CHEST 1 VIEW: CPT | Mod: 26,77

## 2022-10-14 RX ORDER — METOPROLOL TARTRATE 50 MG
1 TABLET ORAL
Qty: 0 | Refills: 0 | DISCHARGE
Start: 2022-10-14

## 2022-10-14 RX ORDER — ACETAMINOPHEN 500 MG
2 TABLET ORAL
Qty: 0 | Refills: 0 | DISCHARGE
Start: 2022-10-14

## 2022-10-14 RX ORDER — AMLODIPINE BESYLATE 2.5 MG/1
1 TABLET ORAL
Qty: 0 | Refills: 0 | DISCHARGE
Start: 2022-10-14

## 2022-10-14 RX ORDER — SENNA PLUS 8.6 MG/1
2 TABLET ORAL
Qty: 0 | Refills: 0 | DISCHARGE
Start: 2022-10-14

## 2022-10-14 RX ORDER — SENNA PLUS 8.6 MG/1
2 TABLET ORAL
Qty: 60 | Refills: 0
Start: 2022-10-14 | End: 2022-11-12

## 2022-10-14 RX ORDER — METOPROLOL TARTRATE 50 MG
1 TABLET ORAL
Qty: 30 | Refills: 0
Start: 2022-10-14 | End: 2022-11-12

## 2022-10-14 RX ORDER — AMLODIPINE BESYLATE 2.5 MG/1
1 TABLET ORAL
Qty: 30 | Refills: 0
Start: 2022-10-14 | End: 2022-11-12

## 2022-10-14 RX ADMIN — Medication 650 MILLIGRAM(S): at 13:33

## 2022-10-14 RX ADMIN — Medication 650 MILLIGRAM(S): at 06:56

## 2022-10-14 RX ADMIN — Medication 650 MILLIGRAM(S): at 05:23

## 2022-10-14 RX ADMIN — AMLODIPINE BESYLATE 5 MILLIGRAM(S): 2.5 TABLET ORAL at 05:24

## 2022-10-14 RX ADMIN — Medication 25 MILLIGRAM(S): at 05:24

## 2022-10-14 RX ADMIN — Medication 650 MILLIGRAM(S): at 14:03

## 2022-10-14 NOTE — PROGRESS NOTE ADULT - SUBJECTIVE AND OBJECTIVE BOX
afberile  REVIEW OF SYSTEMS:  GEN: no fever,    no chills  RESP: no SOB,   no cough  CVS: no chest pain,   no palpitations  GI: no abdominal pain,   no nausea,   no vomiting,   no constipation,   no diarrhea  : no dysuria,   no frequency  NEURO: no headache,   no dizziness  PSYCH: no depression,   not anxious  Derm : no rash    MEDICATIONS  (STANDING):  amLODIPine   Tablet 5 milliGRAM(s) Oral daily  metoprolol succinate ER 25 milliGRAM(s) Oral daily  senna 2 Tablet(s) Oral at bedtime    MEDICATIONS  (PRN):  acetaminophen     Tablet .. 650 milliGRAM(s) Oral every 6 hours PRN Mild Pain (1 - 3), Moderate Pain (4 - 6)  acetaminophen     Tablet .. 650 milliGRAM(s) Oral once PRN Mild Pain (1 - 3)  ALPRAZolam 0.25 milliGRAM(s) Oral every 12 hours PRN anxiety  HYDROmorphone  Injectable 0.5 milliGRAM(s) IV Push every 6 hours PRN Moderate Pain (4 - 6)      Vital Signs Last 24 Hrs  T(C): 36.7 (14 Oct 2022 04:43), Max: 36.9 (13 Oct 2022 20:35)  T(F): 98.1 (14 Oct 2022 04:43), Max: 98.5 (13 Oct 2022 20:35)  HR: 92 (14 Oct 2022 04:43) (62 - 101)  BP: 124/70 (14 Oct 2022 04:43) (108/68 - 165/56)  BP(mean): 87 (13 Oct 2022 14:05) (64 - 87)  RR: 18 (14 Oct 2022 04:43) (14 - 22)  SpO2: 93% (14 Oct 2022 04:43) (93% - 99%)    Parameters below as of 14 Oct 2022 04:43  Patient On (Oxygen Delivery Method): room air      CAPILLARY BLOOD GLUCOSE        I&O's Summary    13 Oct 2022 07:01  -  14 Oct 2022 07:00  --------------------------------------------------------  IN: 420 mL / OUT: 65 mL / NET: 355 mL        PHYSICAL EXAM:  HEAD:  Atraumatic, Normocephalic  NECK: Supple, No   JVD  CHEST/LUNG:   no     rales,     no,    rhonchi  HEART: Regular rate and rhythm;         murmur  ABDOMEN: Soft, Nontender, ;   EXTREMITIES:   no     edema  NEUROLOGY:  alert    LABS:                        10.3   6.62  )-----------( 175      ( 14 Oct 2022 07:23 )             33.5     10-14    137  |  100  |  17  ----------------------------<  118<H>  4.1   |  26  |  0.57    Ca    9.1      14 Oct 2022 07:23      PT/INR - ( 13 Oct 2022 05:32 )   PT: 13.1 sec;   INR: 1.14 ratio         PTT - ( 13 Oct 2022 05:32 )  PTT:27.7 sec                        Consultant(s) Notes Reviewed:      Care Discussed with Consultants/Other Providers:

## 2022-10-14 NOTE — PROGRESS NOTE ADULT - NS ATTEND OPT1A GEN_ALL_CORE
Medical decision making

## 2022-10-14 NOTE — DISCHARGE NOTE NURSING/CASE MANAGEMENT/SOCIAL WORK - PATIENT PORTAL LINK FT
You can access the FollowMyHealth Patient Portal offered by [facility] by registering at the following website: [email or web address]. By joining [facility]’s FollowMyHealth portal, you will also be able to view your health information using other applications (apps) compatible with our system.

## 2022-10-14 NOTE — PROGRESS NOTE ADULT - PROBLEM SELECTOR PROBLEM 1
Pleural effusion, left

## 2022-10-14 NOTE — DISCHARGE NOTE PROVIDER - PROVIDER TOKENS
PROVIDER:[TOKEN:[[id number]]] PROVIDER:[TOKEN:[[id number]]],PROVIDER:[TOKEN:[[id number]],FOLLOWUP:[1 week]] PROVIDER:[TOKEN:[[id number]]],PROVIDER:[TOKEN:[[id number]],FOLLOWUP:[1 week]],PROVIDER:[TOKEN:[[id number]],FOLLOWUP:[2 weeks]]

## 2022-10-14 NOTE — DISCHARGE NOTE PROVIDER - NSDCACTIVITY_GEN_ALL_CORE
Do not drive or operate machinery/No heavy lifting/straining Do not drive or operate machinery/Walking - Indoors allowed/No heavy lifting/straining/Walking - Outdoors allowed

## 2022-10-14 NOTE — DISCHARGE NOTE PROVIDER - NSDCFUADDAPPT_GEN_ALL_CORE_FT
APPTS ARE READY TO BE MADE: [X ] YES    Best Family or Patient Contact (if needed):    Additional Information about above appointments (if needed):    1:   2:   3:     Other comments or requests:    APPTS ARE READY TO BE MADE: [X ] YES    Best Family or Patient Contact (if needed):    Additional Information about above appointments (if needed):    1: Follow up with PCP in one week   2: Follow up with oncology Dr. Butler in one to two weeks   3: Follow up with Thoracic surgery Dr. Manning in two weeks     Other comments or requests:    APPTS ARE READY TO BE MADE: [X ] YES    Best Family or Patient Contact (if needed):    Additional Information about above appointments (if needed):    1: Follow up with PCP in one week   2: Follow up with oncology Dr. Butler in one to two weeks   3: Follow up with Thoracic surgery Dr. Manning in two weeks     Other comments or requests:   Patient was previously scheduled with John Solano on (10/24 11 AM) at [address] [place] [address].  Patient was previously scheduled with Char Vides on (10/26 12:20 PM) at [address] [place] [address], [facility] [address].  Patient was provided with follow up request details for Shari Santo and was advised to call to schedule follow up within specified time frame.

## 2022-10-14 NOTE — DISCHARGE NOTE PROVIDER - HOSPITAL COURSE
7F     with h/o appendectomy, ex-smoker      presenting with cough x1 month   and mild  sob        found to have a large ELYSE and hilar lung tumor with associated large left pleural effusion.    ct c/a/p,  left hilar/ lung mas.   L  pl  effusion,  mediastinal l adenopathy,  pancreatic  mass small pericardial  effusion,    suspect metastatic lung cancer.  oncology dr jose     thoracic team  and Left chest tube  on 10/ 7,  follow  cytology  HTN, on toprol   cxr., no  pneumothorax    needs  biopsy/  IR eval  noted,  no  intervention, initially   mri  head,/  refused  by pt/  not claustrobic    per  dr boothe,   bx  arrange d by IR    s/p    IR bx of lung mass. path pending    start   d/c planning, when cleared by thoracic/  removal of chest tube    f/p with d r damon     garry rucker/  [phone]     77F with h/o appendectomy, ex-smoker      presenting with cough x1 month   and mild  sob        found to have a large ELYSE and hilar lung tumor with associated large left pleural effusion.    ct c/a/p,  left hilar/ lung mas.   L  pl  effusion,  mediastinal l adenopathy,  pancreatic  mass small pericardial  effusion,    suspect metastatic lung cancer.  oncology dr jose     thoracic team  and Left chest tube  on 10/ 7,  follow  cytology  HTN, on toprol   cxr., no  pneumothorax    needs  biopsy/  IR eval  noted,  no  intervention, initially   mri  head,/  refused  by pt/  not claustrobic    per  dr boothe,   bx  arrange d by IR    s/p    IR bx of lung mass. path pending    start   d/c planning, when cleared by thoracic/  removal of chest tube    f/p with d r damon     garry rucker/  [phone]

## 2022-10-14 NOTE — DISCHARGE NOTE PROVIDER - CARE PROVIDER_API CALL
Char Butler  HEMATOLOGY  [address] [facility], [address]  [place] [address]  Phone: [phone]  Fax: [phone]  Follow Up Time:    Char Butler  HEMATOLOGY  [address] [facility], [address]  [place] [address]  Phone: [phone]  Fax: [phone]  Follow Up Time:     John Constantino  CARDIOVASCULAR DISEASE  [address] [place] [id number]  Phone: [phone]  Fax: [phone]  Follow Up Time: 1 week   Char Butler  HEMATOLOGY  [address] [facility], [address]  [place] [address]  Phone: [phone]  Fax: [phone]  Follow Up Time:     John Constantino  CARDIOVASCULAR DISEASE  [address] [place] [id number]  Phone: [phone]  Fax: [phone]  Follow Up Time: 1 week    Shari Manning)  Surgery; Thoracic Surgery  [address] [place] [address], [place] [address]  Phone: [phone]  Fax: [phone]  Follow Up Time: 2 weeks

## 2022-10-14 NOTE — PROGRESS NOTE ADULT - REASON FOR ADMISSION
sob
lt pl eff   and lt Lung mass
sob
lt pl eff
sob
lt pl effusion
sob

## 2022-10-14 NOTE — PROGRESS NOTE ADULT - ASSESSMENT
77F with h/o appendectomy, ex-smoker presenting with cough x1 month found to have a large ELYSE and hilar lung tumor with associated large left pleural effusion. Currently hemodynamically stable and on room air.    L pigtail catheter at bedside   cytology and cultures from pleural flui  10/8   lt ptc   draining  lws  ambulated  10/9   lt pl tube draining  on water seal  10/11 L PTC - H2O with 90 output overnight  10/12 Patient Refused MRI of Brain to r/o mets. Refusing Xanax for anxiety. L PTC - H2O seal. Still pending Cytology.   10/13: for image guided ELYSE mass biopsy with IR today.  10/14 L PTC placed on Water seal, RPT CXR at [facility], if OKay will pull chest tube

## 2022-10-14 NOTE — DISCHARGE NOTE PROVIDER - NSDCMRMEDTOKEN_GEN_ALL_CORE_FT
acetaminophen 325 mg oral tablet: 2 tab(s) orally every 6 hours, As needed, Mild Pain (1 - 3), Moderate Pain (4 - 6)  amLODIPine 5 mg oral tablet: 1 tab(s) orally once a day  metoprolol succinate 25 mg oral tablet, extended release: 1 tab(s) orally once a day  senna leaf extract oral tablet: 2 tab(s) orally once a day (at bedtime)

## 2022-10-14 NOTE — DISCHARGE NOTE NURSING/CASE MANAGEMENT/SOCIAL WORK - NSDCFUADDAPPT_GEN_ALL_CORE_FT
APPTS ARE READY TO BE MADE: [X ] YES    Best Family or Patient Contact (if needed):    Additional Information about above appointments (if needed):    1: Follow up with PCP in one week   2: Follow up with oncology Dr. Butler in one to two weeks   3: Follow up with Thoracic surgery Dr. Manning in two weeks     Other comments or requests:

## 2022-10-14 NOTE — DISCHARGE NOTE PROVIDER - CARE PROVIDERS DIRECT ADDRESSES
,DirectAddress_Unknown ,DirectAddress_Unknown,DirectAddress_Unknown ,DirectAddress_Unknown,DirectAddress_Unknown,[email or web address]

## 2022-10-14 NOTE — PROGRESS NOTE ADULT - NS ATTEND AMEND GEN_ALL_CORE FT
patient for IR biopsy - if cyto negative. especially given appearance of airway to avoid intubation
biopsy done yesterday - no air leak. will water seal and repeat cxr if okay remove ct   and cleared for d/c
patient for IR biopsy tentatively today. will leave tube in place until after procedure to avoid need for replacement
Patient seen and examined agree with above note as modified, where appropriate, by me. Will need CT guided biopsy vs ebus pending cytology results.
awaiting pleural fluid cytology - recollected yesterday -   may provide diagnosis for stage IV lung ca.   if high output may need pleurx. otherwise remove pigtail and outpatient oncology
family at bedside, drained  > 1L - patient reports chronic cough but not sig sob.   will keep pigtail in place
water seal tube today, cyto not collected per computer -   will resend.

## 2022-10-14 NOTE — PROGRESS NOTE ADULT - SUBJECTIVE AND OBJECTIVE BOX
Interventional Radiology Follow-Up Note.     Patient seen and examined @ bedside around 9AM.    This is a 77y Female s/p ELYSE lung biopsy on 10/13/22 in Interventional Radiology with Dr. Yoni mendez.    Denies SOB, cough.       Medication:   amLODIPine   Tablet: (10-14)  metoprolol succinate ER: (10-14)    Vitals:   T(F): 98, Max: 98.5 (20:35)  HR: 82  BP: 119/72  RR: 18  SpO2: 96%    Physical Exam:  General: Nontoxic, in NAD.  chest bx site dressing c/d/i.   LABS:  WBC 6.62 / Hgb 10.3 / Hct 33.5 / Plt 175  Na 137 / K 4.1 / CO2 26 / Cl 100 / BUN 17 / Cr 0.57 / Glucose 118  ALT -- / AST -- / Alk Phos -- / Tbili --  Ptt -- / Pt -- / INR --      Assessment/Plan:  78 yo female ex-smoker p/w cough found to have a large left hilarity and left upper lobe tumor with confluent multi station mediastinal involvement and large located left pleural effusion s/p chest tube placement. presents to IR for left upper lobe lung mass biopsy.    - xray reviewed with Dr. Saenz, small stable pneumo noted.  - IR will sign off.      Please call IR  [phone] with any questions, concerns, or issues regarding above.    Also available on Teams.

## 2022-10-14 NOTE — PROGRESS NOTE ADULT - PROBLEM SELECTOR PLAN 1
keep Lt PTC  Pleurvac    h20  follow up cyto and pl cx  OOB to chair   \  for all meals to assist in drainage  recommend MRI head to r/o mass - refused   onc recs:  bx of mass  IR image guided ELYSE Mass biopsy today with IR 10/13  L chest tube placed to Water Seal 10/14  WIll Remove chest tube today after CXR @ noon   If post removal CXR okay - thoracic surgery will sign off  f/u in Office outpatient with Dr. Manning in 2 weeks keep Lt PTC  Pleurvac    h20  follow up cyto and pl cx  OOB to chair   \  for all meals to assist in drainage  recommend MRI head to r/o mass - refused   onc recs:  bx of mass  IR image guided ELYSE Mass biopsy today with IR 10/13  L chest tube placed to Water Seal 10/14  Post water seal CXR with stable small PTX unchanged from prior   If post removal CXR stable- unchanged thoracic surgery will sign off. Okay for discharge.   f/u in Office outpatient with Dr. Manning in 2 weeks

## 2022-10-14 NOTE — PROGRESS NOTE ADULT - SUBJECTIVE AND OBJECTIVE BOX
CARDIOLOGY     PROGRESS  NOTE   ________________________________________________    CHIEF COMPLAINT:Patient is a 77y old  Female who presents with a chief complaint of sob (14 Oct 2022 09:04)  no complain.  	  REVIEW OF SYSTEMS:  CONSTITUTIONAL: No fever, weight loss, or fatigue  EYES: No eye pain, visual disturbances, or discharge  ENT:  No difficulty hearing, tinnitus, vertigo; No sinus or throat pain  NECK: No pain or stiffness  RESPIRATORY: No cough, wheezing, chills or hemoptysis; No Shortness of Breath  CARDIOVASCULAR: No chest pain, palpitations, passing out, dizziness, or leg swelling  GASTROINTESTINAL: No abdominal or epigastric pain. No nausea, vomiting, or hematemesis; No diarrhea or constipation. No melena or hematochezia.  GENITOURINARY: No dysuria, frequency, hematuria, or incontinence  NEUROLOGICAL: No headaches, memory loss, loss of strength, numbness, or tremors  SKIN: No itching, burning, rashes, or lesions   LYMPH Nodes: No enlarged glands  ENDOCRINE: No heat or cold intolerance; No hair loss  MUSCULOSKELETAL: No joint pain or swelling; No muscle, back, or extremity pain  PSYCHIATRIC: No depression, anxiety, mood swings, or difficulty sleeping  HEME/LYMPH: No easy bruising, or bleeding gums  ALLERGY AND IMMUNOLOGIC: No hives or eczema	    [ x] All others negative	  [ ] Unable to obtain    PHYSICAL EXAM:  T(C): 36.7 (10-14-22 @ 04:43), Max: 36.9 (10-13-22 @ 20:35)  HR: 92 (10-14-22 @ 04:43) (62 - 101)  BP: 124/70 (10-14-22 @ 04:43) (108/68 - 165/56)  RR: 18 (10-14-22 @ 04:43) (14 - 22)  SpO2: 93% (10-14-22 @ 04:43) (93% - 99%)  Wt(kg): --  I&O's Summary    13 Oct 2022 07:01  -  14 Oct 2022 07:00  --------------------------------------------------------  IN: 420 mL / OUT: 65 mL / NET: 355 mL        Appearance: Normal	  HEENT:   Normal oral mucosa, PERRL, EOMI	  Lymphatic: No lymphadenopathy  Cardiovascular: Normal S1 S2, No JVD, + murmurs, No edema  Respiratory: Lungs clear to auscultation	  Psychiatry: A & O x 3, Mood & affect appropriate  Gastrointestinal:  Soft, Non-tender, + BS	  Skin: No rashes, No ecchymoses, No cyanosis	  Neurologic: Non-focal  Extremities: Normal range of motion, No clubbing, cyanosis or edema  Vascular: Peripheral pulses palpable 2+ bilaterally    MEDICATIONS  (STANDING):  amLODIPine   Tablet 5 milliGRAM(s) Oral daily  metoprolol succinate ER 25 milliGRAM(s) Oral daily  senna 2 Tablet(s) Oral at bedtime      TELEMETRY: 	    ECG:  	  RADIOLOGY:  OTHER: 	  	  LABS:	 	    CARDIAC MARKERS:                                10.3   6.62  )-----------( 175      ( 14 Oct 2022 07:23 )             33.5     10-14    137  |  100  |  17  ----------------------------<  118<H>  4.1   |  26  |  0.57    Ca    9.1      14 Oct 2022 07:23      proBNP: Serum Pro-Brain Natriuretic Peptide: 194 pg/mL (10-07 @ 14:12)    Lipid Profile:   HgA1c:   TSH:   PT/INR - ( 13 Oct 2022 05:32 )   PT: 13.1 sec;   INR: 1.14 ratio         PTT - ( 13 Oct 2022 05:32 )  PTT:27.7 sec    Large left hilar and left upper lobe tumor as described with confluent   multistation mediastinal involvement versus lymphadenopathy and moderate   to large loculated left pleural effusion is highly suspicious for small   cell lung carcinoma. Recommend biopsy to confirm diagnosis    Small pericardial effusion.    Heterogeneous mid to distal pancreatic body 4.7 cm mass, presumably   metastatic  Patient has left chest tube, cytology is negative for malignant cells  IR has seen patient, image guided ELYSE mass bx planned for today, 10/13/22    Clinically has extensive stage SCLC or metastatic NSCLC.  Management to be decided with after pathology is back.   MRI brain has been refused by patient     Assessment and plan  ---------------------------  77 F no significant PMH presenting to ED with 1 month of SOB and cough. cough was productive now dry. Pt saw PCP 1 week ago for sx and had a CXR ordered which showed increased haziness throughout most of the left mid and lower thorax with small areas of residual aeration upper thorax, clear right lung. Pt has appointment with pulmonologist in 3 weeks but did not want to wait that long so came to ED today. Denies fever, HA, CP, abdominal pain, dysuria, N/V/D. Has not taken any medications for sx.  pt with sob and large pleural effusion ?sec to metastatic ca  s/p chest tube/ pulmonary/ thoracic eval and follow up  dvt prophylaxis  biopsy  htn will adjust meds, hold bp meds for now  increasing beta blocker  brain MRI r/o mets  will adjust bp med  echo noted small pericardial effusion  thoracic  surgery appreciated  high risk for dvt, needs dvt prophylaxis

## 2022-10-14 NOTE — DISCHARGE NOTE PROVIDER - NSDCCPCAREPLAN_GEN_ALL_CORE_FT
PRINCIPAL DISCHARGE DIAGNOSIS  Diagnosis: Pleural effusion, left  Assessment and Plan of Treatment: You came with cough and shortness of breath   Found to have a large ELYSE and hilar lung tumor with associated large left pleural effusion.  s/p left chest tube placement, now removed   s/p Lung biopsy by IR  You were seen by oncologist   Please follow up with oncology Dr. Butler for biopsy results   Please follow up  with your primary care physician in one week         SECONDARY DISCHARGE DIAGNOSES  Diagnosis: Lung tumor  Assessment and Plan of Treatment: s/p IR biopsy 10/13  please follow up with oncology Dr. Mina for biopsy results and treatment    Diagnosis: Abnormal CT scan  Assessment and Plan of Treatment: CT of chest, abdomen and Pelvis   Large left hilar and left upper lobe tumor as described with confluent   multistation mediastinal involvement versus lymphadenopathy and moderate  to large loculated left pleural effusion is highly suspicious for small cell lung carcinoma. Recommend biopsy to confirm diagnosis.   Small pericardial effusion.  Heterogeneous mid to distal pancreatic body 4.7 cm mass, presumably   metastatic  Please follow up with oncology Dr. Butler for further evaluation and treatment        PRINCIPAL DISCHARGE DIAGNOSIS  Diagnosis: Pleural effusion, left  Assessment and Plan of Treatment: You came with cough and shortness of breath   Found to have a large ELYSE and hilar lung tumor with associated large left pleural effusion.  s/p left chest tube placement, now removed   s/p Lung biopsy by IR  You were seen by oncologist   Please follow up with oncology Dr. Butler for biopsy results   Follow up with thoracic surgery Dr. Manning in 2 weeks.  Please follow up  with your primary care physician in one week         SECONDARY DISCHARGE DIAGNOSES  Diagnosis: Lung tumor  Assessment and Plan of Treatment: s/p IR biopsy 10/13  please follow up with oncology Dr. Mina for biopsy results and treatment    Diagnosis: Abnormal CT scan  Assessment and Plan of Treatment: CT of chest, abdomen and Pelvis   Large left hilar and left upper lobe tumor as described with confluent   multistation mediastinal involvement versus lymphadenopathy and moderate  to large loculated left pleural effusion is highly suspicious for small cell lung carcinoma. Recommend biopsy to confirm diagnosis.   Small pericardial effusion.  Heterogeneous mid to distal pancreatic body 4.7 cm mass, presumably   metastatic  Please follow up with oncology Dr. Butler for further evaluation and treatment        PRINCIPAL DISCHARGE DIAGNOSIS  Diagnosis: Pleural effusion, left  Assessment and Plan of Treatment: You came with cough and shortness of breath   Found to have a large ELYSE and hilar lung tumor with associated large left pleural effusion.  s/p left chest tube placement, now removed. postpigtail removal CXR with stable small pneumothorax. Cleared for discharge by thoracic surgery  s/p Lung biopsy by IR  You were seen by oncologist   Please follow up with oncology Dr. Butler for biopsy results and treatment  Follow up with thoracic surgery Dr. Manning in 2 weeks.  Please follow up  with your primary care physician in one week         SECONDARY DISCHARGE DIAGNOSES  Diagnosis: Lung tumor  Assessment and Plan of Treatment: s/p IR biopsy 10/13  please follow up with oncology Dr. Mina for biopsy results and treatment    Diagnosis: Abnormal CT scan  Assessment and Plan of Treatment: CT of chest, abdomen and Pelvis   Large left hilar and left upper lobe tumor as described with confluent   multistation mediastinal involvement versus lymphadenopathy and moderate  to large loculated left pleural effusion is highly suspicious for small cell lung carcinoma. Recommend biopsy to confirm diagnosis.   Small pericardial effusion.  Heterogeneous mid to distal pancreatic body 4.7 cm mass, presumably   metastatic  Please follow up with oncology Dr. Butler for further evaluation and treatment

## 2022-10-14 NOTE — PROGRESS NOTE ADULT - NS ATTEND OPT1 GEN_ALL_CORE
I independently performed the documented:
I independently performed the documented:
I attest my time as attending is greater than 50% of the total combined time spent on qualifying patient care activities by the PA/NP and attending.
I independently performed the documented:

## 2022-10-14 NOTE — CHART NOTE - NSCHARTNOTEFT_GEN_A_CORE
Spoke Thoracic surgery PA Dr. Jo  s/p CXR post pigtail removal -CXR stable / small pneumothorax. No Thoracic surgery objection for discharge.     Hilary Maloney NP- C   #[id number] Spoke Thoracic surgery BEVERLY Jo  s/p CXR post pigtail removal -CXR stable / small pneumothorax. No Thoracic surgery objection for discharge.     Hilary Maloney NP- C   #[id number]

## 2022-10-14 NOTE — PROGRESS NOTE ADULT - SUBJECTIVE AND OBJECTIVE BOX
Patient is a 77y old  Female who presents with a chief complaint of sob (14 Oct 2022 09:10)      SUBJECTIVE: "Hi, I feel okay "    Vital Signs Last 24 Hrs  T(C): 36.7 (10-14-22 @ 08:50), Max: 36.9 (10-13-22 @ 20:35)  T(F): 98 (10-14-22 @ 08:50), Max: 98.5 (10-13-22 @ 20:35)  HR: 82 (10-14-22 @ 08:50) (62 - 101)  BP: 119/72 (10-14-22 @ 08:50) (108/68 - 165/56)  RR: 18 (10-14-22 @ 08:50) (14 - 22)  SpO2: 96% (10-14-22 @ 08:50) (93% - 99%)                10-13-22 @ 07:01  -  10-14-22 @ 07:00  --------------------------------------------------------  IN: 420 mL / OUT: 65 mL / NET: 355 mL        Daily Height in cm: 160.02 (13 Oct 2022 13:06)    Daily                           10.3   6.62  )-----------( 175      ( 14 Oct 2022 07:23 )             33.5     10-14    137  |  100  |  17  ----------------------------<  118<H>  4.1   |  26  |  0.57    Ca    9.1      14 Oct 2022 07:23            PHYSICAL EXAM  Neurology: A&Ox3, NAD, no gross deficits  CV : RRR+S1S2  Lungs: Respirations non-labored, B/L BS  Abdomen: Soft, NT/ND, +BSx4Q  Extremities: B/L LE edema, negative calf tenderness, +PP           CHEST TUBES:  Left CT     [  ]LWS  [ X ]H2O seal  Right CT   [  ]LWS  [  ]H2O seal          MEDICATIONS  acetaminophen     Tablet .. 650 milliGRAM(s) Oral every 6 hours PRN  acetaminophen     Tablet .. 650 milliGRAM(s) Oral once PRN  ALPRAZolam 0.25 milliGRAM(s) Oral every 12 hours PRN  amLODIPine   Tablet 5 milliGRAM(s) Oral daily  HYDROmorphone  Injectable 0.5 milliGRAM(s) IV Push every 6 hours PRN  metoprolol succinate ER 25 milliGRAM(s) Oral daily  senna 2 Tablet(s) Oral at bedtime

## 2022-10-17 ENCOUNTER — APPOINTMENT (OUTPATIENT)
Dept: PULMONOLOGY | Facility: CLINIC | Age: 77
End: 2022-10-17

## 2022-10-18 LAB — NON-GYNECOLOGICAL CYTOLOGY STUDY: SIGNIFICANT CHANGE UP

## 2022-11-05 LAB
CULTURE RESULTS: SIGNIFICANT CHANGE UP
SPECIMEN SOURCE: SIGNIFICANT CHANGE UP

## 2022-11-08 PROCEDURE — U0005: CPT

## 2022-11-08 PROCEDURE — 82945 GLUCOSE OTHER FLUID: CPT

## 2022-11-08 PROCEDURE — 36415 COLL VENOUS BLD VENIPUNCTURE: CPT

## 2022-11-08 PROCEDURE — 80053 COMPREHEN METABOLIC PANEL: CPT

## 2022-11-08 PROCEDURE — 87205 SMEAR GRAM STAIN: CPT

## 2022-11-08 PROCEDURE — 84132 ASSAY OF SERUM POTASSIUM: CPT

## 2022-11-08 PROCEDURE — 87070 CULTURE OTHR SPECIMN AEROBIC: CPT

## 2022-11-08 PROCEDURE — 84157 ASSAY OF PROTEIN OTHER: CPT

## 2022-11-08 PROCEDURE — 82803 BLOOD GASES ANY COMBINATION: CPT

## 2022-11-08 PROCEDURE — 83986 ASSAY PH BODY FLUID NOS: CPT

## 2022-11-08 PROCEDURE — 71260 CT THORAX DX C+: CPT | Mod: MA

## 2022-11-08 PROCEDURE — 83880 ASSAY OF NATRIURETIC PEPTIDE: CPT

## 2022-11-08 PROCEDURE — 86901 BLOOD TYPING SEROLOGIC RH(D): CPT

## 2022-11-08 PROCEDURE — 74177 CT ABD & PELVIS W/CONTRAST: CPT | Mod: MA

## 2022-11-08 PROCEDURE — 83605 ASSAY OF LACTIC ACID: CPT

## 2022-11-08 PROCEDURE — 88305 TISSUE EXAM BY PATHOLOGIST: CPT

## 2022-11-08 PROCEDURE — 86900 BLOOD TYPING SEROLOGIC ABO: CPT

## 2022-11-08 PROCEDURE — 88173 CYTOPATH EVAL FNA REPORT: CPT

## 2022-11-08 PROCEDURE — 99285 EMERGENCY DEPT VISIT HI MDM: CPT

## 2022-11-08 PROCEDURE — 85730 THROMBOPLASTIN TIME PARTIAL: CPT

## 2022-11-08 PROCEDURE — 88342 IMHCHEM/IMCYTCHM 1ST ANTB: CPT

## 2022-11-08 PROCEDURE — 85014 HEMATOCRIT: CPT

## 2022-11-08 PROCEDURE — 85025 COMPLETE CBC W/AUTO DIFF WBC: CPT

## 2022-11-08 PROCEDURE — 85018 HEMOGLOBIN: CPT

## 2022-11-08 PROCEDURE — 80048 BASIC METABOLIC PNL TOTAL CA: CPT

## 2022-11-08 PROCEDURE — 89051 BODY FLUID CELL COUNT: CPT

## 2022-11-08 PROCEDURE — 83615 LACTATE (LD) (LDH) ENZYME: CPT

## 2022-11-08 PROCEDURE — 82042 OTHER SOURCE ALBUMIN QUAN EA: CPT

## 2022-11-08 PROCEDURE — 82330 ASSAY OF CALCIUM: CPT

## 2022-11-08 PROCEDURE — 86850 RBC ANTIBODY SCREEN: CPT

## 2022-11-08 PROCEDURE — 87102 FUNGUS ISOLATION CULTURE: CPT

## 2022-11-08 PROCEDURE — 93306 TTE W/DOPPLER COMPLETE: CPT

## 2022-11-08 PROCEDURE — 71045 X-RAY EXAM CHEST 1 VIEW: CPT

## 2022-11-08 PROCEDURE — 85027 COMPLETE CBC AUTOMATED: CPT

## 2022-11-08 PROCEDURE — 97162 PT EVAL MOD COMPLEX 30 MIN: CPT

## 2022-11-08 PROCEDURE — 88341 IMHCHEM/IMCYTCHM EA ADD ANTB: CPT

## 2022-11-08 PROCEDURE — 82947 ASSAY GLUCOSE BLOOD QUANT: CPT

## 2022-11-08 PROCEDURE — 88360 TUMOR IMMUNOHISTOCHEM/MANUAL: CPT

## 2022-11-08 PROCEDURE — 87075 CULTR BACTERIA EXCEPT BLOOD: CPT

## 2022-11-08 PROCEDURE — U0003: CPT

## 2022-11-08 PROCEDURE — 88112 CYTOPATH CELL ENHANCE TECH: CPT

## 2022-11-08 PROCEDURE — 32408 CORE NDL BX LNG/MED PERQ: CPT

## 2022-11-08 PROCEDURE — 71046 X-RAY EXAM CHEST 2 VIEWS: CPT

## 2022-11-08 PROCEDURE — 84295 ASSAY OF SERUM SODIUM: CPT

## 2022-11-08 PROCEDURE — 82435 ASSAY OF BLOOD CHLORIDE: CPT

## 2022-11-08 PROCEDURE — 93005 ELECTROCARDIOGRAM TRACING: CPT

## 2022-11-08 PROCEDURE — 85610 PROTHROMBIN TIME: CPT

## 2023-01-01 ENCOUNTER — INPATIENT (INPATIENT)
Facility: HOSPITAL | Age: 78
LOS: 8 days | Discharge: HOME | DRG: 543 | End: 2023-08-04
Attending: INTERNAL MEDICINE | Admitting: INTERNAL MEDICINE
Payer: MEDICARE

## 2023-01-01 ENCOUNTER — TRANSCRIPTION ENCOUNTER (OUTPATIENT)
Age: 78
End: 2023-01-01

## 2023-01-01 ENCOUNTER — EMERGENCY (EMERGENCY)
Facility: HOSPITAL | Age: 78
LOS: 1 days | Discharge: LEFT BEFORE DISCHARGE | End: 2023-01-01
Attending: EMERGENCY MEDICINE
Payer: MEDICARE

## 2023-01-01 VITALS
DIASTOLIC BLOOD PRESSURE: 57 MMHG | WEIGHT: 115.08 LBS | HEART RATE: 77 BPM | TEMPERATURE: 99 F | OXYGEN SATURATION: 97 % | HEIGHT: 60 IN | SYSTOLIC BLOOD PRESSURE: 121 MMHG | RESPIRATION RATE: 18 BRPM

## 2023-01-01 VITALS
HEIGHT: 60 IN | DIASTOLIC BLOOD PRESSURE: 60 MMHG | WEIGHT: 115.08 LBS | RESPIRATION RATE: 18 BRPM | OXYGEN SATURATION: 94 % | SYSTOLIC BLOOD PRESSURE: 136 MMHG | HEART RATE: 87 BPM | TEMPERATURE: 98 F

## 2023-01-01 VITALS
SYSTOLIC BLOOD PRESSURE: 144 MMHG | HEART RATE: 69 BPM | DIASTOLIC BLOOD PRESSURE: 81 MMHG | OXYGEN SATURATION: 95 % | RESPIRATION RATE: 15 BRPM | TEMPERATURE: 98 F

## 2023-01-01 VITALS
TEMPERATURE: 98 F | RESPIRATION RATE: 18 BRPM | HEART RATE: 68 BPM | DIASTOLIC BLOOD PRESSURE: 59 MMHG | SYSTOLIC BLOOD PRESSURE: 123 MMHG | OXYGEN SATURATION: 96 %

## 2023-01-01 DIAGNOSIS — S32.000A WEDGE COMPRESSION FRACTURE OF UNSPECIFIED LUMBAR VERTEBRA, INITIAL ENCOUNTER FOR CLOSED FRACTURE: ICD-10-CM

## 2023-01-01 DIAGNOSIS — D64.9 ANEMIA, UNSPECIFIED: ICD-10-CM

## 2023-01-01 LAB
ALBUMIN SERPL ELPH-MCNC: 4.2 G/DL — SIGNIFICANT CHANGE UP (ref 3.3–5)
ALBUMIN SERPL ELPH-MCNC: 4.4 G/DL — SIGNIFICANT CHANGE UP (ref 3.3–5)
ALP SERPL-CCNC: 72 U/L — SIGNIFICANT CHANGE UP (ref 40–120)
ALP SERPL-CCNC: 72 U/L — SIGNIFICANT CHANGE UP (ref 40–120)
ALT FLD-CCNC: 10 U/L — SIGNIFICANT CHANGE UP (ref 10–45)
ALT FLD-CCNC: 13 U/L — SIGNIFICANT CHANGE UP (ref 10–45)
ANION GAP SERPL CALC-SCNC: 11 MMOL/L — SIGNIFICANT CHANGE UP (ref 5–17)
ANION GAP SERPL CALC-SCNC: 12 MMOL/L — SIGNIFICANT CHANGE UP (ref 5–17)
ANION GAP SERPL CALC-SCNC: 13 MMOL/L — SIGNIFICANT CHANGE UP (ref 5–17)
ANION GAP SERPL CALC-SCNC: 13 MMOL/L — SIGNIFICANT CHANGE UP (ref 5–17)
ANION GAP SERPL CALC-SCNC: 14 MMOL/L — SIGNIFICANT CHANGE UP (ref 5–17)
ANION GAP SERPL CALC-SCNC: 16 MMOL/L — SIGNIFICANT CHANGE UP (ref 5–17)
ANION GAP SERPL CALC-SCNC: 18 MMOL/L — HIGH (ref 5–17)
ANISOCYTOSIS BLD QL: SLIGHT — SIGNIFICANT CHANGE UP
ANISOCYTOSIS BLD QL: SLIGHT — SIGNIFICANT CHANGE UP
APPEARANCE UR: CLEAR — SIGNIFICANT CHANGE UP
APTT BLD: 28 SEC — SIGNIFICANT CHANGE UP (ref 24.5–35.6)
AST SERPL-CCNC: 14 U/L — SIGNIFICANT CHANGE UP (ref 10–40)
AST SERPL-CCNC: 44 U/L — HIGH (ref 10–40)
BACTERIA # UR AUTO: NEGATIVE — SIGNIFICANT CHANGE UP
BASOPHILS # BLD AUTO: 0 K/UL — SIGNIFICANT CHANGE UP (ref 0–0.2)
BASOPHILS # BLD AUTO: 0.02 K/UL — SIGNIFICANT CHANGE UP (ref 0–0.2)
BASOPHILS NFR BLD AUTO: 0 % — SIGNIFICANT CHANGE UP (ref 0–2)
BASOPHILS NFR BLD AUTO: 0.3 % — SIGNIFICANT CHANGE UP (ref 0–2)
BILIRUB SERPL-MCNC: 0.6 MG/DL — SIGNIFICANT CHANGE UP (ref 0.2–1.2)
BILIRUB SERPL-MCNC: 0.7 MG/DL — SIGNIFICANT CHANGE UP (ref 0.2–1.2)
BILIRUB UR-MCNC: NEGATIVE — SIGNIFICANT CHANGE UP
BUN SERPL-MCNC: 14 MG/DL — SIGNIFICANT CHANGE UP (ref 7–23)
BUN SERPL-MCNC: 17 MG/DL — SIGNIFICANT CHANGE UP (ref 7–23)
BUN SERPL-MCNC: 18 MG/DL — SIGNIFICANT CHANGE UP (ref 7–23)
BUN SERPL-MCNC: 18 MG/DL — SIGNIFICANT CHANGE UP (ref 7–23)
BUN SERPL-MCNC: 20 MG/DL — SIGNIFICANT CHANGE UP (ref 7–23)
BUN SERPL-MCNC: 21 MG/DL — SIGNIFICANT CHANGE UP (ref 7–23)
BUN SERPL-MCNC: 29 MG/DL — HIGH (ref 7–23)
CALCIUM SERPL-MCNC: 9.1 MG/DL — SIGNIFICANT CHANGE UP (ref 8.4–10.5)
CALCIUM SERPL-MCNC: 9.2 MG/DL — SIGNIFICANT CHANGE UP (ref 8.4–10.5)
CALCIUM SERPL-MCNC: 9.3 MG/DL — SIGNIFICANT CHANGE UP (ref 8.4–10.5)
CALCIUM SERPL-MCNC: 9.4 MG/DL — SIGNIFICANT CHANGE UP (ref 8.4–10.5)
CALCIUM SERPL-MCNC: 9.7 MG/DL — SIGNIFICANT CHANGE UP (ref 8.4–10.5)
CHLORIDE SERPL-SCNC: 101 MMOL/L — SIGNIFICANT CHANGE UP (ref 96–108)
CHLORIDE SERPL-SCNC: 102 MMOL/L — SIGNIFICANT CHANGE UP (ref 96–108)
CHLORIDE SERPL-SCNC: 104 MMOL/L — SIGNIFICANT CHANGE UP (ref 96–108)
CHLORIDE SERPL-SCNC: 104 MMOL/L — SIGNIFICANT CHANGE UP (ref 96–108)
CHLORIDE SERPL-SCNC: 99 MMOL/L — SIGNIFICANT CHANGE UP (ref 96–108)
CO2 SERPL-SCNC: 19 MMOL/L — LOW (ref 22–31)
CO2 SERPL-SCNC: 22 MMOL/L — SIGNIFICANT CHANGE UP (ref 22–31)
CO2 SERPL-SCNC: 22 MMOL/L — SIGNIFICANT CHANGE UP (ref 22–31)
CO2 SERPL-SCNC: 23 MMOL/L — SIGNIFICANT CHANGE UP (ref 22–31)
CO2 SERPL-SCNC: 24 MMOL/L — SIGNIFICANT CHANGE UP (ref 22–31)
CO2 SERPL-SCNC: 24 MMOL/L — SIGNIFICANT CHANGE UP (ref 22–31)
CO2 SERPL-SCNC: 25 MMOL/L — SIGNIFICANT CHANGE UP (ref 22–31)
COLOR SPEC: SIGNIFICANT CHANGE UP
CREAT SERPL-MCNC: 0.45 MG/DL — LOW (ref 0.5–1.3)
CREAT SERPL-MCNC: 0.49 MG/DL — LOW (ref 0.5–1.3)
CREAT SERPL-MCNC: 0.49 MG/DL — LOW (ref 0.5–1.3)
CREAT SERPL-MCNC: 0.54 MG/DL — SIGNIFICANT CHANGE UP (ref 0.5–1.3)
CREAT SERPL-MCNC: 0.56 MG/DL — SIGNIFICANT CHANGE UP (ref 0.5–1.3)
CREAT SERPL-MCNC: 0.66 MG/DL — SIGNIFICANT CHANGE UP (ref 0.5–1.3)
CREAT SERPL-MCNC: 0.68 MG/DL — SIGNIFICANT CHANGE UP (ref 0.5–1.3)
DACRYOCYTES BLD QL SMEAR: SIGNIFICANT CHANGE UP
DACRYOCYTES BLD QL SMEAR: SLIGHT — SIGNIFICANT CHANGE UP
DIFF PNL FLD: ABNORMAL
EGFR: 89 ML/MIN/1.73M2 — SIGNIFICANT CHANGE UP
EGFR: 89 ML/MIN/1.73M2 — SIGNIFICANT CHANGE UP
EGFR: 93 ML/MIN/1.73M2 — SIGNIFICANT CHANGE UP
EGFR: 94 ML/MIN/1.73M2 — SIGNIFICANT CHANGE UP
EGFR: 96 ML/MIN/1.73M2 — SIGNIFICANT CHANGE UP
EGFR: 96 ML/MIN/1.73M2 — SIGNIFICANT CHANGE UP
EGFR: 98 ML/MIN/1.73M2 — SIGNIFICANT CHANGE UP
ELLIPTOCYTES BLD QL SMEAR: SIGNIFICANT CHANGE UP
ELLIPTOCYTES BLD QL SMEAR: SLIGHT — SIGNIFICANT CHANGE UP
EOSINOPHIL # BLD AUTO: 0.07 K/UL — SIGNIFICANT CHANGE UP (ref 0–0.5)
EOSINOPHIL # BLD AUTO: 0.11 K/UL — SIGNIFICANT CHANGE UP (ref 0–0.5)
EOSINOPHIL NFR BLD AUTO: 0.9 % — SIGNIFICANT CHANGE UP (ref 0–6)
EOSINOPHIL NFR BLD AUTO: 1.8 % — SIGNIFICANT CHANGE UP (ref 0–6)
EPI CELLS # UR: 2 /HPF — SIGNIFICANT CHANGE UP
GLUCOSE SERPL-MCNC: 104 MG/DL — HIGH (ref 70–99)
GLUCOSE SERPL-MCNC: 105 MG/DL — HIGH (ref 70–99)
GLUCOSE SERPL-MCNC: 107 MG/DL — HIGH (ref 70–99)
GLUCOSE SERPL-MCNC: 111 MG/DL — HIGH (ref 70–99)
GLUCOSE SERPL-MCNC: 112 MG/DL — HIGH (ref 70–99)
GLUCOSE SERPL-MCNC: 112 MG/DL — HIGH (ref 70–99)
GLUCOSE SERPL-MCNC: 95 MG/DL — SIGNIFICANT CHANGE UP (ref 70–99)
GLUCOSE UR QL: NEGATIVE — SIGNIFICANT CHANGE UP
HCT VFR BLD CALC: 29.6 % — LOW (ref 34.5–45)
HCT VFR BLD CALC: 29.8 % — LOW (ref 34.5–45)
HCT VFR BLD CALC: 30.9 % — LOW (ref 34.5–45)
HCT VFR BLD CALC: 31.1 % — LOW (ref 34.5–45)
HCT VFR BLD CALC: 31.2 % — LOW (ref 34.5–45)
HCT VFR BLD CALC: 32.3 % — LOW (ref 34.5–45)
HCT VFR BLD CALC: 32.4 % — LOW (ref 34.5–45)
HGB BLD-MCNC: 10 G/DL — LOW (ref 11.5–15.5)
HGB BLD-MCNC: 10.1 G/DL — LOW (ref 11.5–15.5)
HGB BLD-MCNC: 10.4 G/DL — LOW (ref 11.5–15.5)
HGB BLD-MCNC: 9.3 G/DL — LOW (ref 11.5–15.5)
HGB BLD-MCNC: 9.5 G/DL — LOW (ref 11.5–15.5)
HGB BLD-MCNC: 9.7 G/DL — LOW (ref 11.5–15.5)
HGB BLD-MCNC: 9.7 G/DL — LOW (ref 11.5–15.5)
HYALINE CASTS # UR AUTO: 1 /LPF — SIGNIFICANT CHANGE UP (ref 0–2)
HYPOCHROMIA BLD QL: SIGNIFICANT CHANGE UP
HYPOCHROMIA BLD QL: SIGNIFICANT CHANGE UP
IMM GRANULOCYTES NFR BLD AUTO: 0.5 % — SIGNIFICANT CHANGE UP (ref 0–0.9)
INR BLD: 1.11 RATIO — SIGNIFICANT CHANGE UP (ref 0.85–1.18)
KETONES UR-MCNC: NEGATIVE — SIGNIFICANT CHANGE UP
LEUKOCYTE ESTERASE UR-ACNC: NEGATIVE — SIGNIFICANT CHANGE UP
LYMPHOCYTES # BLD AUTO: 1.18 K/UL — SIGNIFICANT CHANGE UP (ref 1–3.3)
LYMPHOCYTES # BLD AUTO: 1.25 K/UL — SIGNIFICANT CHANGE UP (ref 1–3.3)
LYMPHOCYTES # BLD AUTO: 14.8 % — SIGNIFICANT CHANGE UP (ref 13–44)
LYMPHOCYTES # BLD AUTO: 20 % — SIGNIFICANT CHANGE UP (ref 13–44)
MANUAL SMEAR VERIFICATION: SIGNIFICANT CHANGE UP
MANUAL SMEAR VERIFICATION: SIGNIFICANT CHANGE UP
MCHC RBC-ENTMCNC: 19.1 PG — LOW (ref 27–34)
MCHC RBC-ENTMCNC: 19.2 PG — LOW (ref 27–34)
MCHC RBC-ENTMCNC: 19.2 PG — LOW (ref 27–34)
MCHC RBC-ENTMCNC: 19.3 PG — LOW (ref 27–34)
MCHC RBC-ENTMCNC: 19.5 PG — LOW (ref 27–34)
MCHC RBC-ENTMCNC: 31.2 GM/DL — LOW (ref 32–36)
MCHC RBC-ENTMCNC: 31.3 GM/DL — LOW (ref 32–36)
MCHC RBC-ENTMCNC: 31.4 GM/DL — LOW (ref 32–36)
MCHC RBC-ENTMCNC: 31.4 GM/DL — LOW (ref 32–36)
MCHC RBC-ENTMCNC: 31.9 GM/DL — LOW (ref 32–36)
MCHC RBC-ENTMCNC: 32.1 GM/DL — SIGNIFICANT CHANGE UP (ref 32–36)
MCHC RBC-ENTMCNC: 32.1 GM/DL — SIGNIFICANT CHANGE UP (ref 32–36)
MCV RBC AUTO: 60.7 FL — LOW (ref 80–100)
MCV RBC AUTO: 60.7 FL — LOW (ref 80–100)
MCV RBC AUTO: 60.8 FL — LOW (ref 80–100)
MCV RBC AUTO: 61.2 FL — LOW (ref 80–100)
MCV RBC AUTO: 61.3 FL — LOW (ref 80–100)
MCV RBC AUTO: 61.5 FL — LOW (ref 80–100)
MCV RBC AUTO: 61.6 FL — LOW (ref 80–100)
MICROCYTES BLD QL: SLIGHT — SIGNIFICANT CHANGE UP
MICROCYTES BLD QL: SLIGHT — SIGNIFICANT CHANGE UP
MONOCYTES # BLD AUTO: 0.34 K/UL — SIGNIFICANT CHANGE UP (ref 0–0.9)
MONOCYTES # BLD AUTO: 0.5 K/UL — SIGNIFICANT CHANGE UP (ref 0–0.9)
MONOCYTES NFR BLD AUTO: 4.3 % — SIGNIFICANT CHANGE UP (ref 2–14)
MONOCYTES NFR BLD AUTO: 8 % — SIGNIFICANT CHANGE UP (ref 2–14)
NEUTROPHILS # BLD AUTO: 4.35 K/UL — SIGNIFICANT CHANGE UP (ref 1.8–7.4)
NEUTROPHILS # BLD AUTO: 6.36 K/UL — SIGNIFICANT CHANGE UP (ref 1.8–7.4)
NEUTROPHILS NFR BLD AUTO: 69.4 % — SIGNIFICANT CHANGE UP (ref 43–77)
NEUTROPHILS NFR BLD AUTO: 80 % — HIGH (ref 43–77)
NITRITE UR-MCNC: NEGATIVE — SIGNIFICANT CHANGE UP
NRBC # BLD: 0 /100 WBCS — SIGNIFICANT CHANGE UP (ref 0–0)
PH UR: 5.5 — SIGNIFICANT CHANGE UP (ref 5–8)
PLAT MORPH BLD: NORMAL — SIGNIFICANT CHANGE UP
PLAT MORPH BLD: NORMAL — SIGNIFICANT CHANGE UP
PLATELET # BLD AUTO: 114 K/UL — LOW (ref 150–400)
PLATELET # BLD AUTO: 118 K/UL — LOW (ref 150–400)
PLATELET # BLD AUTO: 119 K/UL — LOW (ref 150–400)
PLATELET # BLD AUTO: 128 K/UL — LOW (ref 150–400)
PLATELET # BLD AUTO: 130 K/UL — LOW (ref 150–400)
PLATELET # BLD AUTO: 139 K/UL — LOW (ref 150–400)
PLATELET # BLD AUTO: 142 K/UL — LOW (ref 150–400)
POIKILOCYTOSIS BLD QL AUTO: SIGNIFICANT CHANGE UP
POIKILOCYTOSIS BLD QL AUTO: SIGNIFICANT CHANGE UP
POLYCHROMASIA BLD QL SMEAR: SLIGHT — SIGNIFICANT CHANGE UP
POLYCHROMASIA BLD QL SMEAR: SLIGHT — SIGNIFICANT CHANGE UP
POTASSIUM SERPL-MCNC: 3.6 MMOL/L — SIGNIFICANT CHANGE UP (ref 3.5–5.3)
POTASSIUM SERPL-MCNC: 3.9 MMOL/L — SIGNIFICANT CHANGE UP (ref 3.5–5.3)
POTASSIUM SERPL-MCNC: 4.1 MMOL/L — SIGNIFICANT CHANGE UP (ref 3.5–5.3)
POTASSIUM SERPL-MCNC: 4.2 MMOL/L — SIGNIFICANT CHANGE UP (ref 3.5–5.3)
POTASSIUM SERPL-MCNC: 4.3 MMOL/L — SIGNIFICANT CHANGE UP (ref 3.5–5.3)
POTASSIUM SERPL-MCNC: 5 MMOL/L — SIGNIFICANT CHANGE UP (ref 3.5–5.3)
POTASSIUM SERPL-MCNC: 5.6 MMOL/L — HIGH (ref 3.5–5.3)
POTASSIUM SERPL-SCNC: 3.6 MMOL/L — SIGNIFICANT CHANGE UP (ref 3.5–5.3)
POTASSIUM SERPL-SCNC: 3.9 MMOL/L — SIGNIFICANT CHANGE UP (ref 3.5–5.3)
POTASSIUM SERPL-SCNC: 4.1 MMOL/L — SIGNIFICANT CHANGE UP (ref 3.5–5.3)
POTASSIUM SERPL-SCNC: 4.2 MMOL/L — SIGNIFICANT CHANGE UP (ref 3.5–5.3)
POTASSIUM SERPL-SCNC: 4.3 MMOL/L — SIGNIFICANT CHANGE UP (ref 3.5–5.3)
POTASSIUM SERPL-SCNC: 5 MMOL/L — SIGNIFICANT CHANGE UP (ref 3.5–5.3)
POTASSIUM SERPL-SCNC: 5.6 MMOL/L — HIGH (ref 3.5–5.3)
PROT SERPL-MCNC: 7 G/DL — SIGNIFICANT CHANGE UP (ref 6–8.3)
PROT SERPL-MCNC: 7.3 G/DL — SIGNIFICANT CHANGE UP (ref 6–8.3)
PROT UR-MCNC: ABNORMAL
PROTHROM AB SERPL-ACNC: 11.6 SEC — SIGNIFICANT CHANGE UP (ref 9.5–13)
RBC # BLD: 4.87 M/UL — SIGNIFICANT CHANGE UP (ref 3.8–5.2)
RBC # BLD: 4.88 M/UL — SIGNIFICANT CHANGE UP (ref 3.8–5.2)
RBC # BLD: 5.02 M/UL — SIGNIFICANT CHANGE UP (ref 3.8–5.2)
RBC # BLD: 5.06 M/UL — SIGNIFICANT CHANGE UP (ref 3.8–5.2)
RBC # BLD: 5.13 M/UL — SIGNIFICANT CHANGE UP (ref 3.8–5.2)
RBC # BLD: 5.27 M/UL — HIGH (ref 3.8–5.2)
RBC # BLD: 5.34 M/UL — HIGH (ref 3.8–5.2)
RBC # FLD: 16.7 % — HIGH (ref 10.3–14.5)
RBC # FLD: 16.7 % — HIGH (ref 10.3–14.5)
RBC # FLD: 16.8 % — HIGH (ref 10.3–14.5)
RBC # FLD: 16.9 % — HIGH (ref 10.3–14.5)
RBC # FLD: 17 % — HIGH (ref 10.3–14.5)
RBC # FLD: 17.2 % — HIGH (ref 10.3–14.5)
RBC # FLD: 18 % — HIGH (ref 10.3–14.5)
RBC BLD AUTO: ABNORMAL
RBC BLD AUTO: ABNORMAL
RBC CASTS # UR COMP ASSIST: 4 /HPF — SIGNIFICANT CHANGE UP (ref 0–4)
SCHISTOCYTES BLD QL AUTO: SLIGHT — SIGNIFICANT CHANGE UP
SODIUM SERPL-SCNC: 133 MMOL/L — LOW (ref 135–145)
SODIUM SERPL-SCNC: 138 MMOL/L — SIGNIFICANT CHANGE UP (ref 135–145)
SODIUM SERPL-SCNC: 138 MMOL/L — SIGNIFICANT CHANGE UP (ref 135–145)
SODIUM SERPL-SCNC: 139 MMOL/L — SIGNIFICANT CHANGE UP (ref 135–145)
SODIUM SERPL-SCNC: 140 MMOL/L — SIGNIFICANT CHANGE UP (ref 135–145)
SODIUM SERPL-SCNC: 141 MMOL/L — SIGNIFICANT CHANGE UP (ref 135–145)
SODIUM SERPL-SCNC: 141 MMOL/L — SIGNIFICANT CHANGE UP (ref 135–145)
SP GR SPEC: >1.05 (ref 1.01–1.02)
TARGETS BLD QL SMEAR: SIGNIFICANT CHANGE UP
TARGETS BLD QL SMEAR: SLIGHT — SIGNIFICANT CHANGE UP
UROBILINOGEN FLD QL: NEGATIVE — SIGNIFICANT CHANGE UP
WBC # BLD: 4.98 K/UL — SIGNIFICANT CHANGE UP (ref 3.8–10.5)
WBC # BLD: 5.6 K/UL — SIGNIFICANT CHANGE UP (ref 3.8–10.5)
WBC # BLD: 5.82 K/UL — SIGNIFICANT CHANGE UP (ref 3.8–10.5)
WBC # BLD: 5.88 K/UL — SIGNIFICANT CHANGE UP (ref 3.8–10.5)
WBC # BLD: 6.02 K/UL — SIGNIFICANT CHANGE UP (ref 3.8–10.5)
WBC # BLD: 6.26 K/UL — SIGNIFICANT CHANGE UP (ref 3.8–10.5)
WBC # BLD: 7.95 K/UL — SIGNIFICANT CHANGE UP (ref 3.8–10.5)
WBC # FLD AUTO: 4.98 K/UL — SIGNIFICANT CHANGE UP (ref 3.8–10.5)
WBC # FLD AUTO: 5.6 K/UL — SIGNIFICANT CHANGE UP (ref 3.8–10.5)
WBC # FLD AUTO: 5.82 K/UL — SIGNIFICANT CHANGE UP (ref 3.8–10.5)
WBC # FLD AUTO: 5.88 K/UL — SIGNIFICANT CHANGE UP (ref 3.8–10.5)
WBC # FLD AUTO: 6.02 K/UL — SIGNIFICANT CHANGE UP (ref 3.8–10.5)
WBC # FLD AUTO: 6.26 K/UL — SIGNIFICANT CHANGE UP (ref 3.8–10.5)
WBC # FLD AUTO: 7.95 K/UL — SIGNIFICANT CHANGE UP (ref 3.8–10.5)
WBC UR QL: 2 /HPF — SIGNIFICANT CHANGE UP (ref 0–5)

## 2023-01-01 PROCEDURE — 70553 MRI BRAIN STEM W/O & W/DYE: CPT | Mod: 26

## 2023-01-01 PROCEDURE — 71045 X-RAY EXAM CHEST 1 VIEW: CPT

## 2023-01-01 PROCEDURE — 72157 MRI CHEST SPINE W/O & W/DYE: CPT | Mod: 26

## 2023-01-01 PROCEDURE — 74177 CT ABD & PELVIS W/CONTRAST: CPT | Mod: 26,MA

## 2023-01-01 PROCEDURE — 85025 COMPLETE CBC W/AUTO DIFF WBC: CPT

## 2023-01-01 PROCEDURE — A9585: CPT

## 2023-01-01 PROCEDURE — 36415 COLL VENOUS BLD VENIPUNCTURE: CPT

## 2023-01-01 PROCEDURE — 81001 URINALYSIS AUTO W/SCOPE: CPT

## 2023-01-01 PROCEDURE — 96374 THER/PROPH/DIAG INJ IV PUSH: CPT | Mod: XU

## 2023-01-01 PROCEDURE — 85027 COMPLETE CBC AUTOMATED: CPT

## 2023-01-01 PROCEDURE — 99285 EMERGENCY DEPT VISIT HI MDM: CPT

## 2023-01-01 PROCEDURE — 99285 EMERGENCY DEPT VISIT HI MDM: CPT | Mod: 25

## 2023-01-01 PROCEDURE — 85730 THROMBOPLASTIN TIME PARTIAL: CPT

## 2023-01-01 PROCEDURE — 72129 CT CHEST SPINE W/DYE: CPT | Mod: 26,MA

## 2023-01-01 PROCEDURE — 99284 EMERGENCY DEPT VISIT MOD MDM: CPT | Mod: 25

## 2023-01-01 PROCEDURE — 80053 COMPREHEN METABOLIC PANEL: CPT

## 2023-01-01 PROCEDURE — 71260 CT THORAX DX C+: CPT | Mod: MA

## 2023-01-01 PROCEDURE — 85610 PROTHROMBIN TIME: CPT

## 2023-01-01 PROCEDURE — 80048 BASIC METABOLIC PNL TOTAL CA: CPT

## 2023-01-01 PROCEDURE — 71045 X-RAY EXAM CHEST 1 VIEW: CPT | Mod: 26

## 2023-01-01 PROCEDURE — 74177 CT ABD & PELVIS W/CONTRAST: CPT | Mod: MA

## 2023-01-01 PROCEDURE — 72132 CT LUMBAR SPINE W/DYE: CPT | Mod: 26,MA

## 2023-01-01 PROCEDURE — 71260 CT THORAX DX C+: CPT | Mod: 26,MA

## 2023-01-01 PROCEDURE — 93005 ELECTROCARDIOGRAM TRACING: CPT

## 2023-01-01 PROCEDURE — 96374 THER/PROPH/DIAG INJ IV PUSH: CPT

## 2023-01-01 PROCEDURE — 70553 MRI BRAIN STEM W/O & W/DYE: CPT

## 2023-01-01 PROCEDURE — 97162 PT EVAL MOD COMPLEX 30 MIN: CPT

## 2023-01-01 PROCEDURE — 72157 MRI CHEST SPINE W/O & W/DYE: CPT

## 2023-01-01 RX ORDER — HYDROMORPHONE HYDROCHLORIDE 2 MG/ML
0.5 INJECTION INTRAMUSCULAR; INTRAVENOUS; SUBCUTANEOUS EVERY 8 HOURS
Refills: 0 | Status: DISCONTINUED | OUTPATIENT
Start: 2023-01-01 | End: 2023-01-01

## 2023-01-01 RX ORDER — METOPROLOL TARTRATE 50 MG
25 TABLET ORAL DAILY
Refills: 0 | Status: DISCONTINUED | OUTPATIENT
Start: 2023-01-01 | End: 2023-01-01

## 2023-01-01 RX ORDER — LANOLIN ALCOHOL/MO/W.PET/CERES
3 CREAM (GRAM) TOPICAL AT BEDTIME
Refills: 0 | Status: DISCONTINUED | OUTPATIENT
Start: 2023-01-01 | End: 2023-01-01

## 2023-01-01 RX ORDER — HEPARIN SODIUM 5000 [USP'U]/ML
5000 INJECTION INTRAVENOUS; SUBCUTANEOUS EVERY 12 HOURS
Refills: 0 | Status: DISCONTINUED | OUTPATIENT
Start: 2023-01-01 | End: 2023-01-01

## 2023-01-01 RX ORDER — LIDOCAINE 4 G/100G
1 CREAM TOPICAL ONCE
Refills: 0 | Status: COMPLETED | OUTPATIENT
Start: 2023-01-01 | End: 2023-01-01

## 2023-01-01 RX ORDER — AMLODIPINE BESYLATE 2.5 MG/1
5 TABLET ORAL DAILY
Refills: 0 | Status: DISCONTINUED | OUTPATIENT
Start: 2023-01-01 | End: 2023-01-01

## 2023-01-01 RX ORDER — ACETAMINOPHEN 500 MG
1000 TABLET ORAL ONCE
Refills: 0 | Status: COMPLETED | OUTPATIENT
Start: 2023-01-01 | End: 2023-01-01

## 2023-01-01 RX ORDER — OXYCODONE HYDROCHLORIDE 5 MG/1
5 TABLET ORAL DAILY
Refills: 0 | Status: DISCONTINUED | OUTPATIENT
Start: 2023-01-01 | End: 2023-01-01

## 2023-01-01 RX ORDER — SENNA PLUS 8.6 MG/1
2 TABLET ORAL AT BEDTIME
Refills: 0 | Status: DISCONTINUED | OUTPATIENT
Start: 2023-01-01 | End: 2023-01-01

## 2023-01-01 RX ORDER — OXYCODONE HYDROCHLORIDE 5 MG/1
5 TABLET ORAL THREE TIMES A DAY
Refills: 0 | Status: DISCONTINUED | OUTPATIENT
Start: 2023-01-01 | End: 2023-01-01

## 2023-01-01 RX ORDER — AMLODIPINE BESYLATE 2.5 MG/1
1 TABLET ORAL
Qty: 30 | Refills: 0 | DISCHARGE

## 2023-01-01 RX ORDER — ACETAMINOPHEN 500 MG
1000 TABLET ORAL ONCE
Refills: 0 | Status: DISCONTINUED | OUTPATIENT
Start: 2023-01-01 | End: 2023-01-01

## 2023-01-01 RX ORDER — OXYCODONE HYDROCHLORIDE 5 MG/1
1 TABLET ORAL
Qty: 5 | Refills: 0
Start: 2023-01-01 | End: 2023-01-01

## 2023-01-01 RX ORDER — ONDANSETRON 8 MG/1
4 TABLET, FILM COATED ORAL ONCE
Refills: 0 | Status: DISCONTINUED | OUTPATIENT
Start: 2023-01-01 | End: 2023-01-01

## 2023-01-01 RX ORDER — METOPROLOL TARTRATE 50 MG
1 TABLET ORAL
Qty: 30 | Refills: 0 | DISCHARGE

## 2023-01-01 RX ADMIN — Medication 3 MILLIGRAM(S): at 22:03

## 2023-01-01 RX ADMIN — Medication 25 MILLIGRAM(S): at 06:00

## 2023-01-01 RX ADMIN — OXYCODONE HYDROCHLORIDE 5 MILLIGRAM(S): 5 TABLET ORAL at 17:06

## 2023-01-01 RX ADMIN — HYDROMORPHONE HYDROCHLORIDE 0.5 MILLIGRAM(S): 2 INJECTION INTRAMUSCULAR; INTRAVENOUS; SUBCUTANEOUS at 11:31

## 2023-01-01 RX ADMIN — Medication 1 MILLIGRAM(S): at 17:06

## 2023-01-01 RX ADMIN — OXYCODONE HYDROCHLORIDE 5 MILLIGRAM(S): 5 TABLET ORAL at 11:57

## 2023-01-01 RX ADMIN — HEPARIN SODIUM 5000 UNIT(S): 5000 INJECTION INTRAVENOUS; SUBCUTANEOUS at 05:02

## 2023-01-01 RX ADMIN — LIDOCAINE 1 PATCH: 4 CREAM TOPICAL at 11:22

## 2023-01-01 RX ADMIN — HEPARIN SODIUM 5000 UNIT(S): 5000 INJECTION INTRAVENOUS; SUBCUTANEOUS at 05:12

## 2023-01-01 RX ADMIN — HYDROMORPHONE HYDROCHLORIDE 0.5 MILLIGRAM(S): 2 INJECTION INTRAMUSCULAR; INTRAVENOUS; SUBCUTANEOUS at 05:03

## 2023-01-01 RX ADMIN — Medication 3 MILLIGRAM(S): at 21:33

## 2023-01-01 RX ADMIN — Medication 25 MILLIGRAM(S): at 05:08

## 2023-01-01 RX ADMIN — AMLODIPINE BESYLATE 5 MILLIGRAM(S): 2.5 TABLET ORAL at 05:11

## 2023-01-01 RX ADMIN — Medication 3 MILLIGRAM(S): at 21:18

## 2023-01-01 RX ADMIN — HYDROMORPHONE HYDROCHLORIDE 0.5 MILLIGRAM(S): 2 INJECTION INTRAMUSCULAR; INTRAVENOUS; SUBCUTANEOUS at 20:45

## 2023-01-01 RX ADMIN — OXYCODONE HYDROCHLORIDE 5 MILLIGRAM(S): 5 TABLET ORAL at 16:59

## 2023-01-01 RX ADMIN — OXYCODONE HYDROCHLORIDE 5 MILLIGRAM(S): 5 TABLET ORAL at 12:52

## 2023-01-01 RX ADMIN — HYDROMORPHONE HYDROCHLORIDE 0.5 MILLIGRAM(S): 2 INJECTION INTRAMUSCULAR; INTRAVENOUS; SUBCUTANEOUS at 05:56

## 2023-01-01 RX ADMIN — HYDROMORPHONE HYDROCHLORIDE 0.5 MILLIGRAM(S): 2 INJECTION INTRAMUSCULAR; INTRAVENOUS; SUBCUTANEOUS at 21:45

## 2023-01-01 RX ADMIN — Medication 1 MILLIGRAM(S): at 12:26

## 2023-01-01 RX ADMIN — HYDROMORPHONE HYDROCHLORIDE 0.5 MILLIGRAM(S): 2 INJECTION INTRAMUSCULAR; INTRAVENOUS; SUBCUTANEOUS at 19:56

## 2023-01-01 RX ADMIN — SENNA PLUS 2 TABLET(S): 8.6 TABLET ORAL at 21:19

## 2023-01-01 RX ADMIN — HYDROMORPHONE HYDROCHLORIDE 0.5 MILLIGRAM(S): 2 INJECTION INTRAMUSCULAR; INTRAVENOUS; SUBCUTANEOUS at 10:26

## 2023-01-01 RX ADMIN — OXYCODONE HYDROCHLORIDE 5 MILLIGRAM(S): 5 TABLET ORAL at 08:12

## 2023-01-01 RX ADMIN — HEPARIN SODIUM 5000 UNIT(S): 5000 INJECTION INTRAVENOUS; SUBCUTANEOUS at 17:13

## 2023-01-01 RX ADMIN — HEPARIN SODIUM 5000 UNIT(S): 5000 INJECTION INTRAVENOUS; SUBCUTANEOUS at 17:06

## 2023-01-01 RX ADMIN — OXYCODONE HYDROCHLORIDE 5 MILLIGRAM(S): 5 TABLET ORAL at 13:00

## 2023-01-01 RX ADMIN — HEPARIN SODIUM 5000 UNIT(S): 5000 INJECTION INTRAVENOUS; SUBCUTANEOUS at 17:00

## 2023-01-01 RX ADMIN — HYDROMORPHONE HYDROCHLORIDE 0.5 MILLIGRAM(S): 2 INJECTION INTRAMUSCULAR; INTRAVENOUS; SUBCUTANEOUS at 20:20

## 2023-01-01 RX ADMIN — HEPARIN SODIUM 5000 UNIT(S): 5000 INJECTION INTRAVENOUS; SUBCUTANEOUS at 05:04

## 2023-01-01 RX ADMIN — HEPARIN SODIUM 5000 UNIT(S): 5000 INJECTION INTRAVENOUS; SUBCUTANEOUS at 17:39

## 2023-01-01 RX ADMIN — OXYCODONE HYDROCHLORIDE 5 MILLIGRAM(S): 5 TABLET ORAL at 08:39

## 2023-01-01 RX ADMIN — OXYCODONE HYDROCHLORIDE 5 MILLIGRAM(S): 5 TABLET ORAL at 05:00

## 2023-01-01 RX ADMIN — OXYCODONE HYDROCHLORIDE 5 MILLIGRAM(S): 5 TABLET ORAL at 21:04

## 2023-01-01 RX ADMIN — HYDROMORPHONE HYDROCHLORIDE 0.5 MILLIGRAM(S): 2 INJECTION INTRAMUSCULAR; INTRAVENOUS; SUBCUTANEOUS at 10:44

## 2023-01-01 RX ADMIN — HYDROMORPHONE HYDROCHLORIDE 0.5 MILLIGRAM(S): 2 INJECTION INTRAMUSCULAR; INTRAVENOUS; SUBCUTANEOUS at 21:30

## 2023-01-01 RX ADMIN — HEPARIN SODIUM 5000 UNIT(S): 5000 INJECTION INTRAVENOUS; SUBCUTANEOUS at 05:10

## 2023-01-01 RX ADMIN — SENNA PLUS 2 TABLET(S): 8.6 TABLET ORAL at 20:38

## 2023-01-01 RX ADMIN — HEPARIN SODIUM 5000 UNIT(S): 5000 INJECTION INTRAVENOUS; SUBCUTANEOUS at 06:21

## 2023-01-01 RX ADMIN — HYDROMORPHONE HYDROCHLORIDE 0.5 MILLIGRAM(S): 2 INJECTION INTRAMUSCULAR; INTRAVENOUS; SUBCUTANEOUS at 13:35

## 2023-01-01 RX ADMIN — Medication 400 MILLIGRAM(S): at 11:21

## 2023-01-01 RX ADMIN — OXYCODONE HYDROCHLORIDE 5 MILLIGRAM(S): 5 TABLET ORAL at 12:09

## 2023-01-01 RX ADMIN — HEPARIN SODIUM 5000 UNIT(S): 5000 INJECTION INTRAVENOUS; SUBCUTANEOUS at 17:54

## 2023-01-01 RX ADMIN — AMLODIPINE BESYLATE 5 MILLIGRAM(S): 2.5 TABLET ORAL at 05:10

## 2023-01-01 RX ADMIN — OXYCODONE HYDROCHLORIDE 5 MILLIGRAM(S): 5 TABLET ORAL at 11:13

## 2023-01-01 RX ADMIN — HEPARIN SODIUM 5000 UNIT(S): 5000 INJECTION INTRAVENOUS; SUBCUTANEOUS at 05:07

## 2023-01-01 RX ADMIN — HEPARIN SODIUM 5000 UNIT(S): 5000 INJECTION INTRAVENOUS; SUBCUTANEOUS at 05:22

## 2023-01-01 RX ADMIN — OXYCODONE HYDROCHLORIDE 5 MILLIGRAM(S): 5 TABLET ORAL at 13:30

## 2023-01-01 RX ADMIN — AMLODIPINE BESYLATE 5 MILLIGRAM(S): 2.5 TABLET ORAL at 05:04

## 2023-01-01 RX ADMIN — OXYCODONE HYDROCHLORIDE 5 MILLIGRAM(S): 5 TABLET ORAL at 12:31

## 2023-01-01 RX ADMIN — OXYCODONE HYDROCHLORIDE 5 MILLIGRAM(S): 5 TABLET ORAL at 18:07

## 2023-01-01 RX ADMIN — OXYCODONE HYDROCHLORIDE 5 MILLIGRAM(S): 5 TABLET ORAL at 09:11

## 2023-01-01 RX ADMIN — HYDROMORPHONE HYDROCHLORIDE 0.5 MILLIGRAM(S): 2 INJECTION INTRAMUSCULAR; INTRAVENOUS; SUBCUTANEOUS at 16:36

## 2023-01-01 RX ADMIN — Medication 3 MILLIGRAM(S): at 21:04

## 2023-01-01 RX ADMIN — OXYCODONE HYDROCHLORIDE 5 MILLIGRAM(S): 5 TABLET ORAL at 05:04

## 2023-01-01 RX ADMIN — AMLODIPINE BESYLATE 5 MILLIGRAM(S): 2.5 TABLET ORAL at 05:02

## 2023-01-01 RX ADMIN — AMLODIPINE BESYLATE 5 MILLIGRAM(S): 2.5 TABLET ORAL at 09:17

## 2023-01-01 RX ADMIN — Medication 25 MILLIGRAM(S): at 06:21

## 2023-01-01 RX ADMIN — HEPARIN SODIUM 5000 UNIT(S): 5000 INJECTION INTRAVENOUS; SUBCUTANEOUS at 05:08

## 2023-01-01 RX ADMIN — OXYCODONE HYDROCHLORIDE 5 MILLIGRAM(S): 5 TABLET ORAL at 17:00

## 2023-01-01 RX ADMIN — OXYCODONE HYDROCHLORIDE 5 MILLIGRAM(S): 5 TABLET ORAL at 09:55

## 2023-01-01 RX ADMIN — Medication 25 MILLIGRAM(S): at 09:18

## 2023-01-01 RX ADMIN — OXYCODONE HYDROCHLORIDE 5 MILLIGRAM(S): 5 TABLET ORAL at 04:04

## 2023-01-01 RX ADMIN — HYDROMORPHONE HYDROCHLORIDE 0.5 MILLIGRAM(S): 2 INJECTION INTRAMUSCULAR; INTRAVENOUS; SUBCUTANEOUS at 20:38

## 2023-01-01 RX ADMIN — HYDROMORPHONE HYDROCHLORIDE 0.5 MILLIGRAM(S): 2 INJECTION INTRAMUSCULAR; INTRAVENOUS; SUBCUTANEOUS at 17:00

## 2023-01-01 RX ADMIN — HEPARIN SODIUM 5000 UNIT(S): 5000 INJECTION INTRAVENOUS; SUBCUTANEOUS at 18:23

## 2023-01-01 RX ADMIN — Medication 3 MILLIGRAM(S): at 21:31

## 2023-01-01 RX ADMIN — OXYCODONE HYDROCHLORIDE 5 MILLIGRAM(S): 5 TABLET ORAL at 18:13

## 2023-01-01 RX ADMIN — HEPARIN SODIUM 5000 UNIT(S): 5000 INJECTION INTRAVENOUS; SUBCUTANEOUS at 05:59

## 2023-01-01 RX ADMIN — Medication 3 MILLIGRAM(S): at 21:01

## 2023-01-01 RX ADMIN — Medication 25 MILLIGRAM(S): at 05:10

## 2023-01-01 RX ADMIN — HYDROMORPHONE HYDROCHLORIDE 0.5 MILLIGRAM(S): 2 INJECTION INTRAMUSCULAR; INTRAVENOUS; SUBCUTANEOUS at 05:54

## 2023-01-01 RX ADMIN — Medication 25 MILLIGRAM(S): at 05:07

## 2023-01-01 RX ADMIN — OXYCODONE HYDROCHLORIDE 5 MILLIGRAM(S): 5 TABLET ORAL at 11:02

## 2023-01-01 RX ADMIN — HEPARIN SODIUM 5000 UNIT(S): 5000 INJECTION INTRAVENOUS; SUBCUTANEOUS at 17:17

## 2023-01-01 RX ADMIN — HYDROMORPHONE HYDROCHLORIDE 0.5 MILLIGRAM(S): 2 INJECTION INTRAMUSCULAR; INTRAVENOUS; SUBCUTANEOUS at 19:57

## 2023-01-01 RX ADMIN — OXYCODONE HYDROCHLORIDE 5 MILLIGRAM(S): 5 TABLET ORAL at 18:00

## 2023-01-01 RX ADMIN — Medication 3 MILLIGRAM(S): at 20:37

## 2023-01-01 RX ADMIN — HEPARIN SODIUM 5000 UNIT(S): 5000 INJECTION INTRAVENOUS; SUBCUTANEOUS at 17:01

## 2023-01-01 RX ADMIN — OXYCODONE HYDROCHLORIDE 5 MILLIGRAM(S): 5 TABLET ORAL at 09:20

## 2023-01-01 RX ADMIN — HYDROMORPHONE HYDROCHLORIDE 0.5 MILLIGRAM(S): 2 INJECTION INTRAMUSCULAR; INTRAVENOUS; SUBCUTANEOUS at 22:03

## 2023-01-01 RX ADMIN — SENNA PLUS 2 TABLET(S): 8.6 TABLET ORAL at 21:34

## 2023-01-01 RX ADMIN — AMLODIPINE BESYLATE 5 MILLIGRAM(S): 2.5 TABLET ORAL at 06:21

## 2023-01-01 RX ADMIN — OXYCODONE HYDROCHLORIDE 5 MILLIGRAM(S): 5 TABLET ORAL at 11:50

## 2023-01-01 RX ADMIN — Medication 3 MILLIGRAM(S): at 21:44

## 2023-01-01 RX ADMIN — AMLODIPINE BESYLATE 5 MILLIGRAM(S): 2.5 TABLET ORAL at 05:07

## 2023-01-01 RX ADMIN — HYDROMORPHONE HYDROCHLORIDE 0.5 MILLIGRAM(S): 2 INJECTION INTRAMUSCULAR; INTRAVENOUS; SUBCUTANEOUS at 20:33

## 2023-01-01 RX ADMIN — OXYCODONE HYDROCHLORIDE 5 MILLIGRAM(S): 5 TABLET ORAL at 10:03

## 2023-01-01 RX ADMIN — OXYCODONE HYDROCHLORIDE 5 MILLIGRAM(S): 5 TABLET ORAL at 11:35

## 2023-01-01 RX ADMIN — OXYCODONE HYDROCHLORIDE 5 MILLIGRAM(S): 5 TABLET ORAL at 16:49

## 2023-01-01 RX ADMIN — Medication 400 MILLIGRAM(S): at 10:32

## 2023-01-01 RX ADMIN — Medication 1000 MILLIGRAM(S): at 11:00

## 2023-01-01 RX ADMIN — HYDROMORPHONE HYDROCHLORIDE 0.5 MILLIGRAM(S): 2 INJECTION INTRAMUSCULAR; INTRAVENOUS; SUBCUTANEOUS at 13:58

## 2023-01-01 RX ADMIN — OXYCODONE HYDROCHLORIDE 5 MILLIGRAM(S): 5 TABLET ORAL at 09:00

## 2023-01-01 RX ADMIN — OXYCODONE HYDROCHLORIDE 5 MILLIGRAM(S): 5 TABLET ORAL at 11:28

## 2023-01-01 RX ADMIN — AMLODIPINE BESYLATE 5 MILLIGRAM(S): 2.5 TABLET ORAL at 06:00

## 2023-01-01 RX ADMIN — Medication 25 MILLIGRAM(S): at 05:03

## 2023-01-01 RX ADMIN — OXYCODONE HYDROCHLORIDE 5 MILLIGRAM(S): 5 TABLET ORAL at 09:14

## 2023-01-01 RX ADMIN — OXYCODONE HYDROCHLORIDE 5 MILLIGRAM(S): 5 TABLET ORAL at 11:19

## 2023-01-01 RX ADMIN — HYDROMORPHONE HYDROCHLORIDE 0.5 MILLIGRAM(S): 2 INJECTION INTRAMUSCULAR; INTRAVENOUS; SUBCUTANEOUS at 04:44

## 2023-01-01 RX ADMIN — SENNA PLUS 2 TABLET(S): 8.6 TABLET ORAL at 21:44

## 2023-01-01 RX ADMIN — OXYCODONE HYDROCHLORIDE 5 MILLIGRAM(S): 5 TABLET ORAL at 13:58

## 2023-01-01 RX ADMIN — Medication 25 MILLIGRAM(S): at 05:04

## 2023-01-01 RX ADMIN — HYDROMORPHONE HYDROCHLORIDE 0.5 MILLIGRAM(S): 2 INJECTION INTRAMUSCULAR; INTRAVENOUS; SUBCUTANEOUS at 06:20

## 2023-01-01 RX ADMIN — AMLODIPINE BESYLATE 5 MILLIGRAM(S): 2.5 TABLET ORAL at 05:08

## 2023-01-01 RX ADMIN — OXYCODONE HYDROCHLORIDE 5 MILLIGRAM(S): 5 TABLET ORAL at 12:02

## 2023-01-01 RX ADMIN — HYDROMORPHONE HYDROCHLORIDE 0.5 MILLIGRAM(S): 2 INJECTION INTRAMUSCULAR; INTRAVENOUS; SUBCUTANEOUS at 03:03

## 2023-01-01 RX ADMIN — Medication 25 MILLIGRAM(S): at 05:11

## 2023-07-24 NOTE — ED PROVIDER NOTE - CLINICAL SUMMARY MEDICAL DECISION MAKING FREE TEXT BOX
RGUJRAL 77-year-old female history of CAD, small cell lung cancer status post chemo presents with 3 to 4 days of atraumatic back pain.  States pain is bilateral lower back L4-L5.  Patient has history of kidney stones denies any dysuria.  Admits to urinary frequency.  Denies any nausea vomiting diarrhea, positive constipation.  Positive flatulence.  No fevers no chills.  No numbness or tingling or change in bladder or bowel.  On exam patient is well-appearing no acute distress, Patient is awake,alert,oriented x 3. Patient is well appearing and in no acute distress. Patient's chest is clear to ausculation, +s1s2. Abdomen is soft nd/nt +BS. Extremity with no swelling or calf tenderness. no posterior midline tenderness about palpation thoracic or lumbar.  No CVA tenderness.  Bilateral lower extremities 5 out of 5 strength normal sensation.  2+ dorsalis pedis.  Negative straight leg test.  Will obtain labs CT to evaluate for metastasis or occult fracture pain control and monitor.

## 2023-07-24 NOTE — CONSULT NOTE ADULT - SUBJECTIVE AND OBJECTIVE BOX
p (1480)     HPI:77F Hx osteoporosis, SCLC. P/w new onset back pain, denies Hx of falls or other trauma. CT Lspine c/f L2 comp fx. Exam: AOx3, CNs grossly intact. LUCIANO 5/5, SILT. no campos's or clonus.    Imaging:    Exam:    --Anticoagulation:    =====================  PAST MEDICAL HISTORY   No pertinent past medical history      PAST SURGICAL HISTORY   No significant past surgical history      No Known Allergies      MEDICATIONS:  Antibiotics:    Neuro:    Other:      SOCIAL HISTORY:   Occupation:   Marital Status:     FAMILY HISTORY:      ROS: Negative except per HPI    LABS:                          10.4   7.95  )-----------( 139      ( 24 Jul 2023 11:33 )             32.4     07-24    138  |  104  |  17  ----------------------------<  107<H>  4.1   |  22  |  0.49<L>    Ca    9.2      24 Jul 2023 14:42    TPro  7.3  /  Alb  4.4  /  TBili  0.6  /  DBili  x   /  AST  44<H>  /  ALT  13  /  AlkPhos  72  07-24

## 2023-07-24 NOTE — CONSULT NOTE ADULT - ASSESSMENT
77F Hx osteoporosis, SCLC. P/w new onset back pain, denies Hx of falls or other trauma. CT Lspine c/f L2 comp fx. Exam: AOx3, CNs grossly intact. LUCIANO 5/5, SILT. no campos's or clonus.  -pain mgmt per primary.   -MRI w wo L spine to r/o path fx.  -can get post void bladder scan. If residual>300, low threshold for lynch.  -pt likely to leave AMA, willing to get MRI outpt if so

## 2023-07-24 NOTE — ED PROVIDER NOTE - CARE PROVIDER_API CALL
Ariel Dumont [facility]  Neurosurgery  [address], [address]  [place] [address]  Phone: [phone]  Fax: [phone]  Follow Up Time:

## 2023-07-24 NOTE — ED PROVIDER NOTE - NSFOLLOWUPINSTRUCTIONS_ED_ALL_ED_FT
Follow up with your Oncologist Dr. Char Butler regarding your CT findings.  For pain, you can take Tylenol 650mg every 6 hours as needed for pain.  Lidocaine patch 4% to the back for pain. Follow directions for application, remove after 12 hours and keep off for 12 hours.   If patient develops increasing pain, develops weakness, numbness, tingling or any other concern return to the hospital. Follow up with your Oncologist Dr. Char Butler regarding your CT findings.  For pain, you can take Tylenol 650mg every 6 hours as needed for pain.  Lidocaine patch 4% to the back for pain. Follow directions for application, remove after 12 hours and keep off for 12 hours.   If patient develops increasing pain, develops weakness, numbness, tingling or any other concern return to the hospital.  You were advised to stay for MRI spine, and you declined, risk were explained to you. You are leaving again medical advice, please follow up with Dr. Dumont in 1-2 days and return for any concern or if you change your mind.

## 2023-07-24 NOTE — ED ADULT NURSE REASSESSMENT NOTE - NS ED NURSE REASSESS COMMENT FT1
patient ambulating independently with steady gait noted. pending spine recs and TBDC as per MD Mosley

## 2023-07-24 NOTE — ED PROVIDER NOTE - PROGRESS NOTE DETAILS
Dr. Butler oncologist. Spine consulted. KIMBERLYUJBASSEM Dr. Butler paged for call back, pt states she feels better and does not want to stay in the hospital and will follow up outpatient. Explained that CT findings need a follow up and discussion with her oncologist. ABDOULAYE Spoke to the radiologist, no acute findings on CT, states prior study had a different contrast timing so unclear to comment on acuity of findings but no emergent concerns. Pt wants to leave and have not head a call back from Dr. Butler, expressed that she needs to follow up and given a copy of results. RGUJRAL Spine recommends an MRI spine to further evaluate. Pt declined and states she wants to leave AMA. RGUJRAL

## 2023-07-24 NOTE — ED PROVIDER NOTE - PATIENT PORTAL LINK FT
You can access the FollowMyHealth Patient Portal offered by [facility] by registering at the following website: [email or web address]. By joining [employer]’s FollowMyHealth portal, you will also be able to view your health information using other applications (apps) compatible with our system.

## 2023-07-24 NOTE — ED ADULT NURSE NOTE - OBJECTIVE STATEMENT
78 y/o female coming to the ER with c/o back pain. A&Ox4. Ambulatory. PMH lung CA with mets last chemo July 17. Patient endorses 4 days of atraumatic lower back pain. patient endorses no loss of bowel or bladder. Patient has equal strength and sensation in b/l lower extremities. Patient endorses some associated weakness due to pain in the b/l lower extremities. Patient endorses no falls.

## 2023-07-26 NOTE — ED PROVIDER NOTE - PROGRESS NOTE DETAILS
spoke to neurosx resident. made aware of the patient being admitted to medicine for pain control and rehab. -BEVERLY Martinez

## 2023-07-26 NOTE — ED ADULT NURSE NOTE - NSFALLHARMRISKINTERV_ED_ALL_ED
Assistance OOB with selected safe patient handling equipment if applicable/Communicate risk of Fall with Harm to all staff, patient, and family/Provide visual cue: red socks, yellow wristband, yellow gown, etc/Reinforce activity limits and safety measures with patient and family/Bed in lowest position, wheels locked, appropriate side rails in place/Call bell, personal items and telephone in reach/Instruct patient to call for assistance before getting out of bed/chair/stretcher/Non-slip footwear applied when patient is off stretcher/[place] to call system/Physically safe environment - no spills, clutter or unnecessary equipment/Purposeful Proactive Rounding/Room/bathroom lighting operational, light cord in reach

## 2023-07-26 NOTE — ED PROVIDER NOTE - PHYSICAL EXAMINATION
Gen: AAO x 3, NAD  Skin: No rashes or lesions  HEENT: NC/AT, PERRLA, EOMI, MMM  Resp: unlabored CTAB  Cardiac: rrr s1s2, no murmurs, rubs or gallops  GI: ND, +BS, Soft, NT  Ext: no pedal edema, FROM in all extremities +midline L spine tenderness (lidoderm patch over the region) 5/5 strength, nl sensation. FROM of the hips/knees and feet.   Neuro: no focal deficits

## 2023-07-26 NOTE — H&P ADULT - ASSESSMENT
77yof      pmhx of Lung cancer, no active treatment, , HTN    h/o  lung cancer. /  SCLC,   left  hilar mass.  s/p  chest  tube  2022,  for  pl  effusion/ seen by onc  dr arauz       here with persistent lower back pain. pt seen 2 days ago  in  er,  for acute L2 compression fx, seen by Neurosx, recommended   MRI but pt did not want to stay in the hospital.      denies  numbness or tingling, no bowel or bladder issues. no   motor weakness./ now  returns   to  er  again with back pain/  denies  fevers/ cp/sob/  abd  pain      back pain,  ct  with  comp fx.   mri  spine, pending   was   seen  by  spine  2  days  ago   h/o  SCLC/  metastatic  disease  CT 7/24,,  left   hilar mass, c/c  right  rib   fx/ . tumor  thromus  in left sop Pulm  V,', old,  and  tumor  thrombus   Splenic V, old  L5,  and new  l2  deformoty    mri  spine/  pain meds/  onc dr jose  HTN  on dvt ppx         rad< from: CT Abdomen and Pelvis w/ IV Cont (07.24.23 @ 12:50) >  IFINDINGS:  CHEST:  LUNGS AND LARGE AIRWAYS: Patent central airways. Marked decrease in size   of the left hilar/mediastinal conglomerate mass now measuring 3.5 cm in   craniocaudal dimension (601:52), previously 9 cm. Interval reexpansion of   the left mainstem bronchus and lobar bronchi (previously narrowed by the   confluent tumor). Mildly nodular thickening of the left fissure,   particularly at its peripheral location, contiguous with the hilar mass.   A punctate right lower lobe calcified granuloma.  PLEURA: Interval resolution of the left pleural effusion.  VESSELS: Direct extension of tumorinto the left superior pulmonary vein   with tumor thrombus, likely present on prior as the vessel was near   completely obscured by tumor at that time. Main pulmonary artery is   normal in caliber. Reexpansion of the left main pulmonary artery since  prior (previously narrowed by the confluent tumor). Atherosclerotic   change of a normal caliber thoracic aorta.  HEART: Heart size is normal. No pericardial effusion. Aortic valve and   coronary artery calcifications.  MEDIASTINUM AND ADE: Left hilar mass, as above. Additional   lymphadenopathy with a reference pretracheal node measuring 0.9 x 0.7 cm   (2:82), stable. A previously referenced node between the left common   carotid artery and the left subclavian artery is not well visualized.  CHEST WALL AND LOWER NECK: Chronic fractures of the right fourth through   eighth ribs.    ABDOMEN AND PELVIS:  LIVER: Within normal limits.  BILE DUCTS: Normal caliber.  GALLBLADDER: Within normal limits.  SPLEEN: Within normal limits.  PANCREAS: A couple pancreatic masses, probable metastatic disease, as   follows:  A mass in the pancreatic body measures 1.1 x 1 cm (2:76), stable.  A dominant heterogeneous mass in the distal body/tail measures 5.5 x 5 x   5.9 cm (2:66), increased from 4.8 x 3.9 x 4.9 cm on 10/7/2022. The mass   is inseparable from the stomach and the splenic artery and vein. New   since prior, a component of the mass versus adjacent lymphadenopathy   directly invades the adjacent splenic vein (2:65). The mass also abuts   the medial margin of the spleen. No main duct dilatation.  ADRENALS: Within normal limits.  KIDNEYS/URETERS: Mild right renal pelvic fullness versus parapelvic   cysts, stable.    BLADDER: Underdistended.  REPRODUCTIVE ORGANS: Normal uterus.    BOWEL: Colonic diverticulosis without acute diverticulitis. No bowel   obstruction. Appendix is not visualized. No evidence of inflammation in   the pericecal region.  PERITONEUM: No ascites.  VESSELS: Atherosclerotic changes. Direct invasion of tumor/tumor thrombus   in the splenic vein, new since prior.  RETROPERITONEUM/LYMPH NODES: Tumor/peripancreatic lymphadenopathy at the   distal pancreas body/tail, as above.  ABDOMINAL WALL: Within normal limits.  BONES: Degenerative changes. Chronic mild compression deformity of L5. A   mild inferior endplate compression deformity of L2, new since 10/7/2022,   age indeterminate.    IMPRESSION:  Since 10/7/2022, there is a mixed response with decrease in size of the   left hilar mass, but increase in size of the pancreatic mass. The   increased distal body/tail pancreatic mass, probable metastatic disease,   and/or adjacent lymphadenopathy now directly invades the splenic vein   with tumor thrombus. The left hilar mass directly invades the left   superior pulmonary vein with tumor thrombus, which was likely present on   prior as the vessel was near completely obscured by tumor at that time.  An L2 inferior endplate compression deformity is new since 10/7/2022, age   indeterminate. Correlate clinically for point tenderness.  --- End of Report ---    < end of copied text >     77yof      pmhx of Lung cancer, no active treatment, , HTN    h/o  lung cancer. / SCLC,   left  hilar mass.  s/p  chest  tube  2022,  for  pl  effusion/ seen by onc  dr arauz       here with persistent lower back pain. pt seen 2 days ago  in  er,  for acute L2 compression fx, seen by Neuro[facility], recommended   MRI but pt did not want to stay in the hospital.      denies  numbness or tingling, no bowel or bladder issues. no   motor weakness./ now  returns   to  er  again with back pain/  denies  fevers/ cp/sob/  abd  pain      back pain,  ct  with  comp fx.   mri  spine, pending   was   seen  by  spine  2  days  ago   h/o  SCLC/  metastatic  disease  CT 7/24,,  left   hilar mass, c/c  right  rib   fx/ . tumor   thrombus  in left sup Pulm  V,', old,  and  tumor  thrombus   Splenic V, old  L5,  and new  l2  deformity   onc dr jose. /   on  maintenance ,   Alemtuzumab /  pd l inhibitor    no  a/c  needed,  per  heme  HTN  on dvt  ppx/  s/q  heparin  mri  T  spine.  pain meds  defer  w/p  to  oncology/ ?  mets  vs  second  primary    spoke  with    and son/  pt  is  full code         rad< from: CT Abdomen and Pelvis w/ IV Cont (07.24.23 @ 12:50) >  IFINDINGS:  CHEST:  LUNGS AND LARGE AIRWAYS: Patent central airways. Marked decrease in size   of the left hilar/mediastinal conglomerate mass now measuring 3.5 cm in   craniocaudal dimension (601:52), previously 9 cm. Interval reexpansion of   the left mainstem bronchus and lobar bronchi (previously narrowed by the   confluent tumor). Mildly nodular thickening of the left fissure,   particularly at its peripheral location, contiguous with the hilar mass.   A punctate right lower lobe calcified granuloma.  PLEURA: Interval resolution of the left pleural effusion.  VESSELS: Direct extension of tumorinto the left superior pulmonary vein   with tumor thrombus, likely present on prior as the vessel was near   completely obscured by tumor at that time. Main pulmonary artery is   normal in caliber. Reexpansion of the left main pulmonary artery since  prior (previously narrowed by the confluent tumor). Atherosclerotic   change of a normal caliber thoracic aorta.  HEART: Heart size is normal. No pericardial effusion. Aortic valve and   coronary artery calcifications.  MEDIASTINUM AND ADE: Left hilar mass, as above. Additional   lymphadenopathy with a reference pretracheal node measuring 0.9 x 0.7 cm   (2:82), stable. A previously referenced node between the left common   carotid artery and the left subclavian artery is not well visualized.  CHEST WALL AND LOWER NECK: Chronic fractures of the right fourth through   eighth ribs.    ABDOMEN AND PELVIS:  LIVER: Within normal limits.  BILE DUCTS: Normal caliber.  GALLBLADDER: Within normal limits.  SPLEEN: Within normal limits.  PANCREAS: A couple pancreatic masses, probable metastatic disease, as   follows:  A mass in the pancreatic body measures 1.1 x 1 cm (2:76), stable.  A dominant heterogeneous mass in the distal body/tail measures 5.5 x 5 x   5.9 cm (2:66), increased from 4.8 x 3.9 x 4.9 cm on 10/7/2022. The mass   is inseparable from the stomach and the splenic artery and vein. New   since prior, a component of the mass versus adjacent lymphadenopathy   directly invades the adjacent splenic vein (2:65). The mass also abuts   the medial margin of the spleen. No main duct dilatation.  ADRENALS: Within normal limits.  KIDNEYS/URETERS: Mild right renal pelvic fullness versus parapelvic   cysts, stable.    BLADDER: Underdistended.  REPRODUCTIVE ORGANS: Normal uterus.    BOWEL: Colonic diverticulosis without acute diverticulitis. No bowel   obstruction. Appendix is not visualized. No evidence of inflammation in   the pericecal region.  PERITONEUM: No ascites.  VESSELS: Atherosclerotic changes. Direct invasion of tumor/tumor thrombus   in the splenic vein, new since prior.  RETROPERITONEUM/LYMPH NODES: Tumor/peripancreatic lymphadenopathy at the   distal pancreas body/tail, as above.  ABDOMINAL WALL: Within normal limits.  BONES: Degenerative changes. Chronic mild compression deformity of L5. A   mild inferior endplate compression deformity of L2, new since 10/7/2022,   age indeterminate.    IMPRESSION:  Since 10/7/2022, there is a mixed response with decrease in size of the   left hilar mass, but increase in size of the pancreatic mass. The   increased distal body/tail pancreatic mass, probable metastatic disease,   and/or adjacent lymphadenopathy now directly invades the splenic vein   with tumor thrombus. The left hilar mass directly invades the left   superior pulmonary vein with tumor thrombus, which was likely present on   prior as the vessel was near completely obscured by tumor at that time.  An L2 inferior endplate compression deformity is new since 10/7/2022, age   indeterminate. Correlate clinically for point tenderness.  --- End of Report ---    < end of copied text >

## 2023-07-26 NOTE — H&P ADULT - NSHPLABSRESULTS_GEN_ALL_CORE
LABS:                        9.7    6.26  )-----------( 128      ( 26 Jul 2023 10:40 )             31.1     07-26    141  |  104  |  18  ----------------------------<  112<H>  3.9   |  24  |  0.49<L>    Ca    9.2      26 Jul 2023 10:40    TPro  7.0  /  Alb  4.2  /  TBili  0.7  /  DBili  x   /  AST  14  /  ALT  10  /  AlkPhos  72  07-26    PT/INR - ( 26 Jul 2023 10:40 )   PT: 11.6 sec;   INR: 1.11 ratio         PTT - ( 26 Jul 2023 10:40 )  PTT:28.0 sec      Urinalysis Basic - ( 26 Jul 2023 10:40 )    Color: x / Appearance: x / SG: x / pH: x  Gluc: 112 mg/dL / Ketone: x  / Bili: x / Urobili: x   Blood: x / Protein: x / Nitrite: x   Leuk Esterase: x / RBC: x / WBC x   Sq Epi: x / Non Sq Epi: x / Bacteria: x

## 2023-07-26 NOTE — ED PROVIDER NOTE - CLINICAL SUMMARY MEDICAL DECISION MAKING FREE TEXT BOX
l2 fx acute still w much pain at home on po pain meds. reviews recent Neurosurgery note. Iv ofirmev, cbc,cmp, admit for mrChrissie armijo need pt/rehab. no abn neuro findings on h/p to suggest cord issue -Milan Alvarez MD-

## 2023-07-26 NOTE — ED PROVIDER NOTE - OBJECTIVE STATEMENT
77yof pmhx of Lung cancer, no active treatment, hx of Osteoporosis and HTN here with persistent lower back pain. pt seen 2 days ago for acute L2 compression fx, seen by Neurosx, recommended pain control and MRI but pt did not want to stay in the hospital. reports taking tylenol with no relief. No numbness or tingling, no bowel or bladder issues. no weakness. No trauma or injuries.

## 2023-07-26 NOTE — ED ADULT NURSE NOTE - CHPI ED NUR QUALITY2
70-year-old male with history of benign pituitary adenoma, hypertension presents to the emergency department with shortness of breath in the setting of Covid. He took a positive home test approximately 4 days ago and has been feeling worse since that time. No chest pain. Has exertional dyspnea. The history is provided by the patient and medical records. Shortness of Breath  This is a new problem. The problem occurs continuously. The problem has been gradually worsening. Associated symptoms include a fever, headaches and cough. Pertinent negatives include no sore throat, no sputum production, no chest pain, no vomiting, no abdominal pain, no rash and no leg swelling. He has tried nothing for the symptoms. He has had no prior hospitalizations. Associated medical issues do not include asthma or COPD. Past Medical History:   Diagnosis Date    Endocrine disease     benighn pituitary adenoma    Hypertension        Past Surgical History:   Procedure Laterality Date    HX HEENT      nose    HX ORTHOPAEDIC      arm left    HX ORTHOPAEDIC      knee         No family history on file. Social History     Socioeconomic History    Marital status:      Spouse name: Not on file    Number of children: Not on file    Years of education: Not on file    Highest education level: Not on file   Occupational History    Not on file   Tobacco Use    Smoking status: Never Smoker    Smokeless tobacco: Never Used   Substance and Sexual Activity    Alcohol use:  Yes    Drug use: Not on file    Sexual activity: Not on file   Other Topics Concern    Not on file   Social History Narrative    Not on file     Social Determinants of Health     Financial Resource Strain:     Difficulty of Paying Living Expenses: Not on file   Food Insecurity:     Worried About Running Out of Food in the Last Year: Not on file    Dayron of Food in the Last Year: Not on file   Transportation Needs:     Lack of Transportation (Medical): Not on file    Lack of Transportation (Non-Medical): Not on file   Physical Activity:     Days of Exercise per Week: Not on file    Minutes of Exercise per Session: Not on file   Stress:     Feeling of Stress : Not on file   Social Connections:     Frequency of Communication with Friends and Family: Not on file    Frequency of Social Gatherings with Friends and Family: Not on file    Attends Hoahaoism Services: Not on file    Active Member of [address] [place] [address] [employer] Content or Organizations: Not on file    Attends Club or Organization Meetings: Not on file    Marital Status: Not on file   Intimate Partner Violence:     Fear of Current or Ex-Partner: Not on file    Emotionally Abused: Not on file    Physically Abused: Not on file    Sexually Abused: Not on file   Housing Stability:     Unable to Pay for Housing in the Last Year: Not on file    Number of [place] in the Last Year: Not on file    Unstable Housing in the Last Year: Not on file         ALLERGIES: Patient has no known allergies. Review of Systems   Constitutional: Positive for fever. Negative for fatigue. HENT: Negative for sneezing and sore throat. Respiratory: Positive for cough and shortness of breath. Negative for sputum production. Cardiovascular: Negative for chest pain and leg swelling. Gastrointestinal: Negative for abdominal pain, diarrhea, nausea and vomiting. Genitourinary: Negative for difficulty urinating and dysuria. Musculoskeletal: Negative for arthralgias and myalgias. Skin: Negative for color change and rash. Neurological: Positive for headaches. Negative for weakness. Psychiatric/Behavioral: Negative for agitation and behavioral problems.        Vitals:    11/23/21 2040 11/23/21 2058   BP:  (!) 139/99   Pulse: (!) 121 (!) 111   Resp:  20   Temp: 99.5 °F (37.5 °C) (!) 100.5 °F (38.1 °C)   SpO2: 95% 95%   Weight:  106.4 kg (234 lb 9.1 oz)   Height:  5' 7\" (1.702 m)            Physical Exam  Vitals and nursing note reviewed. Constitutional:       General: He is not in acute distress. Appearance: Normal appearance. He is well-developed. He is not ill-appearing, toxic-appearing or diaphoretic. HENT:      Head: Normocephalic and atraumatic. Nose: Nose normal.      Mouth/Throat:      Mouth: Mucous membranes are moist.      Pharynx: Oropharynx is clear. Eyes:      Extraocular Movements: Extraocular movements intact. Conjunctiva/sclera: Conjunctivae normal.      Pupils: Pupils are equal, round, and reactive to light. Cardiovascular:      Rate and Rhythm: Regular rhythm. Tachycardia present. Pulses: Normal pulses. Heart sounds: No murmur heard. Pulmonary:      Effort: Pulmonary effort is normal. No respiratory distress. Breath sounds: Normal breath sounds. No wheezing. Chest:      Chest wall: No mass or tenderness. Abdominal:      General: There is no distension. Palpations: Abdomen is soft. Tenderness: There is no abdominal tenderness. There is no guarding or rebound. Musculoskeletal:         General: No swelling, tenderness, deformity or signs of injury. Normal range of motion. Cervical back: Normal range of motion and neck supple. No rigidity. No muscular tenderness. Right lower leg: No tenderness. No edema. Left lower leg: No tenderness. No edema. Skin:     General: Skin is warm and dry. Capillary Refill: Capillary refill takes less than 2 seconds. Neurological:      General: No focal deficit present. Mental Status: He is alert and oriented to person, place, and time. Psychiatric:         Mood and Affect: Mood normal.         Behavior: Behavior normal.          MDM  Number of Diagnoses or Management Options  COVID-19  Diagnosis management comments: 79-year-old male presents as above with shortness of breath in the setting of Covid. He has no hypoxia at rest or with ambulation to indicate need for advanced imaging or admission.   Plan to discharge with symptomatic treatment, instructions to return if he worsens, follow-up with primary care. Amount and/or Complexity of Data Reviewed  Clinical lab tests: reviewed  Tests in the radiology section of CPT®: reviewed      ED Course as of 11/23/21 2114 Tue Nov 23, 2021 2050 ED EKG interpretation:Rhythm: Sinus tachycardia at a rate of approximately 106. Axis: normal.  ST segment:  No concerning ST elevations or depressions. This EKG was interpreted by Sol Higgins MD,ED Provider.  [JM]      ED Course User Index  [JM] Julio Fowler MD       Procedures sharp

## 2023-07-26 NOTE — CONSULT NOTE ADULT - SUBJECTIVE AND OBJECTIVE BOX
HPI:  77yrs old female with pmhx of SCLC, HTN admitted 7/26/23 with back pain. Patient  seen 7/24/23 for acute L2 compression fx, seen by Neurosx, recommended pain control and MRI but pt did not want to stay in the hospital.  Patient  reported taking tylenol with no relief. No numbness or tingling, no bowel or bladder issues. no weakness. No trauma or injuries.      PAST MEDICAL & SURGICAL HISTORY:  No significant past surgical history          No Known Allergies      FAMILY HISTORY: Non contributory    Social History: Used to smoke for many years, now does not smoke    Medications:  amLODIPine   Tablet 5 milliGRAM(s) Oral daily  heparin   Injectable 5000 Unit(s) SubCutaneous every 12 hours  HYDROmorphone  Injectable 0.5 milliGRAM(s) IV Push every 8 hours PRN Severe Pain (7 - 10)  LORazepam     Tablet 1 milliGRAM(s) Oral once  melatonin 3 milliGRAM(s) Oral at bedtime  metoprolol succinate ER 25 milliGRAM(s) Oral daily  oxyCODONE    IR 5 milliGRAM(s) Oral daily  oxyCODONE    IR 5 milliGRAM(s) Oral three times a day PRN Moderate Pain (4 - 6)  senna 2 Tablet(s) Oral at bedtime    Labs:                        9.7    6.26  )-----------( 128      ( 26 Jul 2023 10:40 )             31.1     07-26    141  |  104  |  18  ----------------------------<  112<H>  3.9   |  24  |  0.49<L>    Ca    9.2      26 Jul 2023 10:40    TPro  7.0  /  Alb  4.2  /  TBili  0.7  /  DBili  x   /  AST  14  /  ALT  10  /  AlkPhos  72  07-26    Radiology:     < from: CT Chest w/ IV Cont (07.24.23 @ 12:50) >  IMPRESSION:  Since 10/7/2022, there is a mixed response with decrease in size of the   left hilar mass, but increase in size of the pancreatic mass. The   increased distal body/tail pancreatic mass, probable metastatic disease,   and/or adjacent lymphadenopathy now directly invades the splenic vein   with tumor thrombus. The left hilar mass directly invades the left   superior pulmonary vein with tumor thrombus, which was likely present on   prior as the vessel was near completely obscured by tumor at that time.    An L2 inferior endplate compression deformity is new since 10/7/2022, age   indeterminate. Correlate clinically for point tenderness.        < end of copied text >  < from: CT Thoracic Spine Reform w/ IV Cont (07.24.23 @ 12:51) >  IMPRESSION:  Acute appearing L2 compression fracture with approximately 45% height   loss. No definite underlying enhancing lesion, however, MRI will be more   sensitive to evaluate for osseous metastases.    < end of copied text >          ROS:  No pain, no fever  No lumps in neck or pain  No Sob or chest pain  No palpitations   No abdominal pain. No change in bowel habit. No blood in stools  No weakness in extremities  No leg swelling  Rest of comprehensive ROS was negative    Vital Signs Last 24 Hrs  T(C): 36.7 (26 Jul 2023 10:30), Max: 37.1 (26 Jul 2023 08:40)  T(F): 98 (26 Jul 2023 10:30), Max: 98.7 (26 Jul 2023 08:40)  HR: 75 (26 Jul 2023 10:30) (75 - 77)  BP: 187/75 (26 Jul 2023 10:30) (121/57 - 187/75)  BP(mean): --  RR: 20 (26 Jul 2023 10:30) (18 - 20)  SpO2: 98% (26 Jul 2023 10:30) (97% - 98%)    Parameters below as of 26 Jul 2023 10:30  Patient On (Oxygen Delivery Method): room air        Physical Exam:  Patient comfortable  AXOX3  Neck supple, no LN's  Chest: bilateral breath sounds, no wheeze or rales  CVS: regular heart rate without murmur  Abdomen: soft, BS+, no massess or organomegaly  CNS: no gross deficit  Musculoskeletal: Normal range of motion  Skin: no rash       HPI:  77yrs old female with pmhx of SCLC, HTN admitted 7/26/23 with back pain. Patient was seen 7/24/23 for acute L2 compression fx, seen by Neurosx, recommended pain control and MRI but pt did not want to stay in the hospital.  Patient  reported taking tylenol with no relief. No numbness or tingling, no bowel or bladder issues. no weakness. No trauma or injuries.      PAST MEDICAL & SURGICAL HISTORY:  No significant past surgical history          No Known Allergies      FAMILY HISTORY: Non contributory    Social History: Used to smoke for many years, now does not smoke    Medications:  amLODIPine   Tablet 5 milliGRAM(s) Oral daily  heparin   Injectable 5000 Unit(s) SubCutaneous every 12 hours  HYDROmorphone  Injectable 0.5 milliGRAM(s) IV Push every 8 hours PRN Severe Pain (7 - 10)  LORazepam     Tablet 1 milliGRAM(s) Oral once  melatonin 3 milliGRAM(s) Oral at bedtime  metoprolol succinate ER 25 milliGRAM(s) Oral daily  oxyCODONE    IR 5 milliGRAM(s) Oral daily  oxyCODONE    IR 5 milliGRAM(s) Oral three times a day PRN Moderate Pain (4 - 6)  senna 2 Tablet(s) Oral at bedtime    Labs:                        9.7    6.26  )-----------( 128      ( 26 Jul 2023 10:40 )             31.1     07-26    141  |  104  |  18  ----------------------------<  112<H>  3.9   |  24  |  0.49<L>    Ca    9.2      26 Jul 2023 10:40    TPro  7.0  /  Alb  4.2  /  TBili  0.7  /  DBili  x   /  AST  14  /  ALT  10  /  AlkPhos  72  07-26    Radiology:     < from: CT Chest w/ IV Cont (07.24.23 @ 12:50) >  IMPRESSION:  Since 10/7/2022, there is a mixed response with decrease in size of the   left hilar mass, but increase in size of the pancreatic mass. The   increased distal body/tail pancreatic mass, probable metastatic disease,   and/or adjacent lymphadenopathy now directly invades the splenic vein   with tumor thrombus. The left hilar mass directly invades the left   superior pulmonary vein with tumor thrombus, which was likely present on   prior as the vessel was near completely obscured by tumor at that time.    An L2 inferior endplate compression deformity is new since 10/7/2022, age   indeterminate. Correlate clinically for point tenderness.        < end of copied text >  < from: CT Thoracic Spine Reform w/ IV Cont (07.24.23 @ 12:51) >  IMPRESSION:  Acute appearing L2 compression fracture with approximately 45% height   loss. No definite underlying enhancing lesion, however, MRI will be more   sensitive to evaluate for osseous metastases.    < end of copied text >          ROS:  No pain, no fever  No lumps in neck or pain  No Sob or chest pain  No palpitations   No abdominal pain. No change in bowel habit. No blood in stools  No weakness in extremities, back pain needing meds  No leg swelling  Rest of comprehensive ROS was negative    Vital Signs Last 24 Hrs  T(C): 36.7 (26 Jul 2023 10:30), Max: 37.1 (26 Jul 2023 08:40)  T(F): 98 (26 Jul 2023 10:30), Max: 98.7 (26 Jul 2023 08:40)  HR: 75 (26 Jul 2023 10:30) (75 - 77)  BP: 187/75 (26 Jul 2023 10:30) (121/57 - 187/75)  BP(mean): --  RR: 20 (26 Jul 2023 10:30) (18 - 20)  SpO2: 98% (26 Jul 2023 10:30) (97% - 98%)    Parameters below as of 26 Jul 2023 10:30  Patient On (Oxygen Delivery Method): room air        Physical Exam:  Patient comfortable  AXOX3  Neck supple, no LN's  Chest: bilateral breath sounds, no wheeze or rales  CVS: regular heart rate without murmur  Abdomen: soft, BS+, no massess or organomegaly  CNS: no gross deficit  Musculoskeletal: Normal range of motion  Skin: no rash

## 2023-07-26 NOTE — CONSULT NOTE ADULT - ASSESSMENT
Patient was admitted in Oct 2022 with SOB, needed left chest tube for pleural effusion and Bx of left lung mass by IR and was dx with extensive stage SCLC. She was treated with Atezolizumab, Carboplatin, Etoposide for 4 cycles starting Nov 29, 2023. PET CT March 9, 2023 showed decrease in lung mass and pancreatic tail mass. She was started on maintenance Atezolizumab in March 21, 2023.  She was admitted 7/26/2023 with back pain   < from: CT Thoracic Spine Reform w/ IV Cont (07.24.23 @ 12:51) >  Acute appearing L2 compression fracture with approximately 45%   height loss. There is no bony retropulsion. No definite underlying   enhancing lesion, although MRI would be more sensitive in this evaluation.  Chronic mild L5 compression fracture.  < from: CT Abdomen and Pelvis w/ IV Cont (07.24.23 @ 12:50) >  < from: CT Chest w/ IV Cont (07.24.23 @ 12:50) >  IMPRESSION:  Since 10/7/2022, there is a mixed response with decrease in size of the   left hilar mass, but increase in size of the pancreatic mass. The   increased distal body/tail pancreatic mass, probable metastatic disease,   and/or adjacent lymphadenopathy now directly invades the splenic vein   with tumor thrombus. The left hilar mass directly invades the left   superior pulmonary vein with tumor thrombus, which was likely present on   prior as the vessel was near completely obscured by tumor at that time.      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 Prognosis from her relapsed malignancy will be poor    MRI spine for evaluation of back pain and NS input         Patient was admitted in Oct 2022 with SOB, needed left chest tube for pleural effusion and Bx of left lung mass by IR and was dx with extensive stage SCLC. She was treated with Atezolizumab, Carboplatin, Etoposide for 4 cycles starting Nov 29, 2023. PET CT March 9, 2023 showed decrease in lung mass and pancreatic tail mass. She was started on maintenance Atezolizumab in March 21, 2023.  She was admitted 7/26/2023 with back pain   < from: CT Thoracic Spine Reform w/ IV Cont (07.24.23 @ 12:51) >  Acute appearing L2 compression fracture with approximately 45%   height loss. There is no bony retropulsion. No definite underlying   enhancing lesion, although MRI would be more sensitive in this evaluation.  Chronic mild L5 compression fracture.  < from: CT Abdomen and Pelvis w/ IV Cont (07.24.23 @ 12:50) >  < from: CT Chest w/ IV Cont (07.24.23 @ 12:50) >  IMPRESSION:  Since 10/7/2022, there is a mixed response with decrease in size of the   left hilar mass, but increase in size of the pancreatic mass. The   increased distal body/tail pancreatic mass, probable metastatic disease,   and/or adjacent lymphadenopathy now directly invades the splenic vein   with tumor thrombus. The left hilar mass directly invades the left   superior pulmonary vein with tumor thrombus, which was likely present on   prior as the vessel was near completely obscured by tumor at that time.    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 Prognosis from her relapsed malignancy will be poor    MRI spine for evaluation of back pain and NS input.  Will discuss with patient's son. Discussed with patient

## 2023-07-26 NOTE — ED ADULT NURSE NOTE - OBJECTIVE STATEMENT
Female 77 years old alert and orientedx4 with past medical history of Lung Cancer and Osteoporosis came in for worsening  back pain for 2 weeks. Pt was seen here in ED last Saturday with same complaints had CT scam done and showed L2 compression fracture. Advised admission by neurosurgery but refused. Reports taking Tylenol with no relief. Denies numbness or tingling, bowel or bladder incontinence. Denies injury or trauma. No fever, chills, chest pain or sob. Safety and comfort maintained. Call bell within easy reach. Will continue to monitor.

## 2023-07-26 NOTE — H&P ADULT - HISTORY OF PRESENT ILLNESS
77yof      pmhx of Lung cancer, no active treatment, , HTN    h/p  lung cancer.  julieta  left  hilar mass.  s/p  chest  tube  2022,  for  pl  effusion/ seen by onc  dr arauz       here with persistent lower back pain. pt seen 2 days ago for acute L2 compression fx, seen by Neurosx, recommended pain control and MRI but pt did not want to stay in the hospital. reports taking tylenol with no relief. No numbness or tingling, no bowel or bladder issues. no weakness. No trauma or injuries.   now  returns   to  er  again with back pain    denies  fevers/ cp/sob/  abd  pain

## 2023-07-26 NOTE — H&P ADULT - NSHPPHYSICALEXAM_GEN_ALL_CORE
T(C): 36.7 (07-26-23 @ 10:30), Max: 37.1 (07-26-23 @ 08:40)  HR: 75 (07-26-23 @ 10:30) (75 - 77)  BP: 187/75 (07-26-23 @ 10:30) (121/57 - 187/75)  RR: 20 (07-26-23 @ 10:30) (18 - 20)  SpO2: 98% (07-26-23 @ 10:30) (97% - 98%)  GENERAL: NAD, lying in bed comfortably  HEAD:  Atraumatic, normocephalic  EYES: EOMI, PERRLA, conjunctiva and sclera clear  NECK: Supple, trachea midline, no JVD  HEART: Regular rate and rhythm, no murmurs, rubs, or gallops  LUNGS: Unlabored respirations.  Clear to auscultation bilaterally, no crackles, wheezing, or rhonchi  ABDOMEN: Soft, nontender, nondistended, +BS  EXTREMITIES: 2+ peripheral pulses bilaterally. No clubbing, cyanosis, or edema  NERVOUS SYSTEM:  A&Ox3, moving all extremities, no focal deficits   SKIN: No rashes or lesions

## 2023-07-26 NOTE — PATIENT PROFILE ADULT - FALL HARM RISK - HARM RISK INTERVENTIONS

## 2023-07-27 NOTE — PHYSICAL THERAPY INITIAL EVALUATION ADULT - PERTINENT HX OF CURRENT PROBLEM, REHAB EVAL
78 y/o F with h/o SCLC admitted 7/26/23 with back pain. Pt seen 7/24/23 for acute L2 compression fx. pt seen by neurosx on admission, recommended pain control and MRI, but pt did not want to stay in the hospital and left AMA. pt now being readmitted for persistent back pain, pending MRI Thoracic spine. CT CHEST/ABDOMEN/PELVIS: Since 10/7/2022, there is a mixed response with decrease in size of the left hilar mass, but increase in size of the pancreatic mass. The increased distal body/tail pancreatic mass, probable metastatic disease, and/or adjacent lymphadenopathy now directly invades the splenic vein with tumor thrombus. The left hilar mass directly invades the left superior pulmonary vein with tumor thrombus, which was likely present on prior as the vessel was near completely obscured by tumor at that time. An L2 inferior endplate compression deformity is new since 10/7/2022, age indeterminate. Correlate clinically for point tenderness. CT THORACIC/LUMBAR SPINE: Acute appearing L2 compression fracture with approximately 45% height loss. No definite underlying enhancing lesion, however, MRI will be more sensitive to evaluate for osseous metastases. CHEST XRAY: no focal consolidation 78 y/o F with h/o SCLC admitted 7/26/23 with back pain. Pt seen 7/24/23 for acute L2 compression fx. pt seen by neurosx on admission, recommended pain control and MRI, but pt did not want to stay in the hospital and left AMA. pt now being readmitted for persistent back pain, pending MRI Thoracic spine. CT CHEST/ABDOMEN/PELVIS: Since 10/7/2022, there is a mixed response with decrease in size of the left hilar mass, but increase in size of the pancreatic mass. The increased distal body/tail pancreatic mass, probable metastatic disease, and/or adjacent lymphadenopathy now directly invades the splenic vein with tumor thrombus. The left hilar mass directly invades the left superior pulmonary vein with tumor thrombus, which was likely present on prior as the vessel was near completely obscured by tumor at that time. An L2 inferior endplate compression deformity is new since 10/7/2022, age indeterminate. Correlate clinically for point tenderness. CT THORACIC/LUMBAR SPINE: Acute appearing L2 compression fracture with approximately 45% height loss. No definite underlying enhancing lesion, however, MRI will be more sensitive to evaluate for osseous metastases. CHEST XRAY: no focal consolidation  MR THORACIC SPINE :cute inferior endplate compression deformity of L2 appears benign without bony retropulsion. Increased signal intensity on the T1-weighted images suggesting increased fatty marrow which may be related to previous radiation therapy. Dilatation of the central canal from T3 through the conus with focal dilatation at the T12-L1 level where there appears to be a cystic nonenhancing mass displacing the cord posteriorly which could represent a small intradural arachnoid cyst, cystic schwannoma or metastasis in view of the history

## 2023-07-27 NOTE — PHYSICAL THERAPY INITIAL EVALUATION ADULT - ADDITIONAL COMMENTS
pt lives in house with spouse and disabled son in his 50's who walks with a walker ; pt has 3-4 steps to enter with HR in back entrance and full flight of steps with HR to bedroom level ; pt owns a std cane from 20 yrs ago per pt and uses as needed , pt spouse has walker rolling type that pt uses sometimes per pt but to tall for her ; pt has a shower bench in tub shower

## 2023-07-27 NOTE — PHYSICAL THERAPY INITIAL EVALUATION ADULT - BED MOBILITY TRAINING, PT EVAL
Addended by: Manav Avendano on: 1/23/2023 03:13 PM     Modules accepted: Orders GOAL: pt will perform bed mobility independent in 1-2 weeks

## 2023-07-27 NOTE — PHYSICAL THERAPY INITIAL EVALUATION ADULT - GENERAL OBSERVATIONS, REHAB EVAL
pt received in bed nad top rails up and 1 siddrail bed in lowest position and bed alarm active , call bell,phone and table in reach , HOB 30 degrees , pt report no pain 0/10 at rest and 8/10 when move in bed rn Jayme Napier informed pt had pain meds not due until 6 pm per rn

## 2023-07-27 NOTE — PHYSICAL THERAPY INITIAL EVALUATION ADULT - REFERRING PHYSICIAN, REHAB EVAL
PT EVALUATE AND TREAT PT EVALUATE AND TREAT; MR Thoracic Spine completed , PT spoke w/ PA Aurora Quiñonez x [phone] by PA to perform bed eval only await NS to see and recs for FE , NS to see on Monday

## 2023-07-27 NOTE — PHYSICAL THERAPY INITIAL EVALUATION ADULT - PLANNED THERAPY INTERVENTIONS, PT EVAL
GOAL stair  train: pt will negotiate  flight of steps with HR in 2 weeks close supervision/balance training/bed mobility training/gait training/strengthening/transfer training

## 2023-07-27 NOTE — PROGRESS NOTE ADULT - ASSESSMENT
77yof      pmhx of Lung cancer, no active treatment, , HTN    h/o  lung cancer. / SCLC,   left  hilar mass.  s/p  chest  tube  2022,  for  pl  effusion/ seen by onc  dr arauz       here with persistent lower back pain. pt seen 2 days ago  in  er,  for acute L2 compression fx, seen by Neurosx, recommended   MRI but pt did not want to stay in the hospital.      denies  numbness or tingling, no bowel or bladder issues. no   motor weakness./ now  returns   to  er  again with back pain/  denies  fevers/ cp/sob/  abd  pain      back pain,  ct  with  comp fx..  was   seen  by  spine   in er   h/o  SCLC/  metastatic  disease  CT 7/24,,  left   hilar mass, c/c  right  rib   fx/ . tumor   thrombus  in left sup Pulm  V,', old,  and  tumor  thrombus   Splenic V, old  L5,  and new  l2  deformity   onc dr jose. /   on  maintenance ,   Alemtuzumab /  pd l inhibitor    no  a/c  needed,  per  heme  HTN   c/c  anmeia  on dvt  ppx/  s/q  heparin  mri  T  spine.  pain meds  defer  w/p  to  oncology/ ?  mets  vs  second  primary   awiating  mri    spoke  with    and son/  pt  is  full code         rad< from: CT Abdomen and Pelvis w/ IV Cont (07.24.23 @ 12:50) >  IFINDINGS:  CHEST:  LUNGS AND LARGE AIRWAYS: Patent central airways. Marked decrease in size   of the left hilar/mediastinal conglomerate mass now measuring 3.5 cm in   craniocaudal dimension (601:52), previously 9 cm. Interval reexpansion of   the left mainstem bronchus and lobar bronchi (previously narrowed by the   confluent tumor). Mildly nodular thickening of the left fissure,   particularly at its peripheral location, contiguous with the hilar mass.   A punctate right lower lobe calcified granuloma.  PLEURA: Interval resolution of the left pleural effusion.  VESSELS: Direct extension of tumorinto the left superior pulmonary vein   with tumor thrombus, likely present on prior as the vessel was near   completely obscured by tumor at that time. Main pulmonary artery is   normal in caliber. Reexpansion of the left main pulmonary artery since  prior (previously narrowed by the confluent tumor). Atherosclerotic   change of a normal caliber thoracic aorta.  HEART: Heart size is normal. No pericardial effusion. Aortic valve and   coronary artery calcifications.  MEDIASTINUM AND ADE: Left hilar mass, as above. Additional   lymphadenopathy with a reference pretracheal node measuring 0.9 x 0.7 cm   (2:82), stable. A previously referenced node between the left common   carotid artery and the left subclavian artery is not well visualized.  CHEST WALL AND LOWER NECK: Chronic fractures of the right fourth through   eighth ribs.    ABDOMEN AND PELVIS:  LIVER: Within normal limits.  BILE DUCTS: Normal caliber.  GALLBLADDER: Within normal limits.  SPLEEN: Within normal limits.  PANCREAS: A couple pancreatic masses, probable metastatic disease, as   follows:  A mass in the pancreatic body measures 1.1 x 1 cm (2:76), stable.  A dominant heterogeneous mass in the distal body/tail measures 5.5 x 5 x   5.9 cm (2:66), increased from 4.8 x 3.9 x 4.9 cm on 10/7/2022. The mass   is inseparable from the stomach and the splenic artery and vein. New   since prior, a component of the mass versus adjacent lymphadenopathy   directly invades the adjacent splenic vein (2:65). The mass also abuts   the medial margin of the spleen. No main duct dilatation.  ADRENALS: Within normal limits.  KIDNEYS/URETERS: Mild right renal pelvic fullness versus parapelvic   cysts, stable.    BLADDER: Underdistended.  REPRODUCTIVE ORGANS: Normal uterus.    BOWEL: Colonic diverticulosis without acute diverticulitis. No bowel   obstruction. Appendix is not visualized. No evidence of inflammation in   the pericecal region.  PERITONEUM: No ascites.  VESSELS: Atherosclerotic changes. Direct invasion of tumor/tumor thrombus   in the splenic vein, new since prior.  RETROPERITONEUM/LYMPH NODES: Tumor/peripancreatic lymphadenopathy at the   distal pancreas body/tail, as above.  ABDOMINAL WALL: Within normal limits.  BONES: Degenerative changes. Chronic mild compression deformity of L5. A   mild inferior endplate compression deformity of L2, new since 10/7/2022,   age indeterminate.    IMPRESSION:  Since 10/7/2022, there is a mixed response with decrease in size of the   left hilar mass, but increase in size of the pancreatic mass. The   increased distal body/tail pancreatic mass, probable metastatic disease,   and/or adjacent lymphadenopathy now directly invades the splenic vein   with tumor thrombus. The left hilar mass directly invades the left   superior pulmonary vein with tumor thrombus, which was likely present on   prior as the vessel was near completely obscured by tumor at that time.  An L2 inferior endplate compression deformity is new since 10/7/2022, age   indeterminate. Correlate clinically for point tenderness.  --- End of Report ---    < end of copied text >

## 2023-07-27 NOTE — PROGRESS NOTE ADULT - SUBJECTIVE AND OBJECTIVE BOX
date of service: 07-27-23 @ 08:25  afberile  REVIEW OF SYSTEMS:  CONSTITUTIONAL: No fever,  no  weight loss  ENT:  No  tinnitus,   no   vertigo  NECK: No pain or stiffness  RESPIRATORY: No cough, wheezing, chills or hemoptysis;    No Shortness of Breath  CARDIOVASCULAR: No chest pain, palpitations, dizziness  GASTROINTESTINAL: No abdominal or epigastric pain. No nausea, vomiting, or hematemesis; No diarrhea  No melena or hematochezia.  GENITOURINARY: No dysuria, frequency, hematuria, or incontinence  NEUROLOGICAL: No headaches  SKIN: No itching,  no   rash  LYMPH Nodes: No enlarged glands  ENDOCRINE: No heat or cold intolerance  MUSCULOSKELETAL: No joint pain or swelling  PSYCHIATRIC: No depression, anxiety  HEME/LYMPH: No easy bruising, or bleeding gums  ALLERGY AND IMMUNOLOGIC: No hives or eczema	    MEDICATIONS  (STANDING):  amLODIPine   Tablet 5 milliGRAM(s) Oral daily  heparin   Injectable 5000 Unit(s) SubCutaneous every 12 hours  LORazepam     Tablet 1 milliGRAM(s) Oral once  melatonin 3 milliGRAM(s) Oral at bedtime  metoprolol succinate ER 25 milliGRAM(s) Oral daily  oxyCODONE    IR 5 milliGRAM(s) Oral daily  senna 2 Tablet(s) Oral at bedtime    MEDICATIONS  (PRN):  HYDROmorphone  Injectable 0.5 milliGRAM(s) IV Push every 8 hours PRN Severe Pain (7 - 10)  oxyCODONE    IR 5 milliGRAM(s) Oral three times a day PRN Moderate Pain (4 - 6)      Vital Signs Last 24 Hrs  T(C): 36.7 (27 Jul 2023 04:55), Max: 37.1 (26 Jul 2023 08:40)  T(F): 98.1 (27 Jul 2023 04:55), Max: 98.7 (26 Jul 2023 08:40)  HR: 73 (27 Jul 2023 04:55) (65 - 77)  BP: 168/66 (27 Jul 2023 04:55) (121/57 - 187/75)  BP(mean): --  RR: 18 (27 Jul 2023 04:55) (16 - 20)  SpO2: 95% (27 Jul 2023 04:55) (95% - 98%)    Parameters below as of 27 Jul 2023 04:55  Patient On (Oxygen Delivery Method): room air      CAPILLARY BLOOD GLUCOSE        I&O's Summary        Appearance: Normal	  HEENT:   Normal oral mucosa, PERRL, EOMI	  Lymphatic: No lymphadenopathy  Cardiovascular: Normal S1 S2, No JVD  Respiratory: Lungs clear to auscultation	  Gastrointestinal:  Soft, Non-tender, + BS	  Skin: No rash, No ecchymoses	  Extremities:     LABS:                        9.7    6.26  )-----------( 128      ( 26 Jul 2023 10:40 )             31.1     07-26    141  |  104  |  18  ----------------------------<  112<H>  3.9   |  24  |  0.49<L>    Ca    9.2      26 Jul 2023 10:40    TPro  7.0  /  Alb  4.2  /  TBili  0.7  /  DBili  x   /  AST  14  /  ALT  10  /  AlkPhos  72  07-26    PT/INR - ( 26 Jul 2023 10:40 )   PT: 11.6 sec;   INR: 1.11 ratio         PTT - ( 26 Jul 2023 10:40 )  PTT:28.0 sec      Urinalysis Basic - ( 26 Jul 2023 10:40 )    Color: x / Appearance: x / SG: x / pH: x  Gluc: 112 mg/dL / Ketone: x  / Bili: x / Urobili: x   Blood: x / Protein: x / Nitrite: x   Leuk Esterase: x / RBC: x / WBC x   Sq Epi: x / Non Sq Epi: x / Bacteria: x                      Consultant(s) Notes Reviewed:      Care Discussed with Consultants/Other Providers:

## 2023-07-27 NOTE — PHYSICAL THERAPY INITIAL EVALUATION ADULT - IMPAIRMENTS FOUND, PT EVAL
pain limiting factor per pt when need to readjust in bed or move , if lie still no pain, tulio Delacruz inform/aerobic capacity/endurance/muscle strength

## 2023-07-28 NOTE — PROGRESS NOTE ADULT - ASSESSMENT
77yof      pmhx of Lung cancer, no active treatment, , HTN    h/o  lung cancer. / SCLC,   left  hilar mass.  s/p  chest  tube  2022,  for  pl  effusion/ seen by onc  dr arauz       here with persistent lower back pain. pt seen 2 days ago  in  er,  for acute L2 compression fx, seen by Neurosx, recommended   MRI but pt did not want to stay in the hospital.      denies  numbness or tingling, no bowel or bladder issues. no   motor weakness./ now  returns   to  er  again with back pain/  denies  fevers/ cp/sob/  abd  pain      back pain,  ct  with  comp fx..  was   seen  by  spine   in er   h/o  SCLC/  metastatic  disease  CT 7/24,,  left   hilar mass, c/c  right  rib   fx/ . tumor   thrombus  in left sup Pulm  V,', old,  and  tumor  thrombus   Splenic V, old  L5,  and new  l2  deformity   onc dr jose. /   on  maintenance ,   Alemtuzumab /  pd l inhibitor    no  a/c  needed,  per  heme  HTN   c/c  anmeia  on dvt  ppx/  s/q  heparin  mri  T  spine.  pain meds  defer  w/p  to  oncology/ ?  mets  vs  second  primary      mri pending   oncology    spoke  with    and son/  pt  is  full code         rad< from: CT Abdomen and Pelvis w/ IV Cont (07.24.23 @ 12:50) >  IFINDINGS:  CHEST:  LUNGS AND LARGE AIRWAYS: Patent central airways. Marked decrease in size   of the left hilar/mediastinal conglomerate mass now measuring 3.5 cm in   craniocaudal dimension (601:52), previously 9 cm. Interval reexpansion of   the left mainstem bronchus and lobar bronchi (previously narrowed by the   confluent tumor). Mildly nodular thickening of the left fissure,   particularly at its peripheral location, contiguous with the hilar mass.   A punctate right lower lobe calcified granuloma.  PLEURA: Interval resolution of the left pleural effusion.  VESSELS: Direct extension of tumorinto the left superior pulmonary vein   with tumor thrombus, likely present on prior as the vessel was near   completely obscured by tumor at that time. Main pulmonary artery is   normal in caliber. Reexpansion of the left main pulmonary artery since  prior (previously narrowed by the confluent tumor). Atherosclerotic   change of a normal caliber thoracic aorta.  HEART: Heart size is normal. No pericardial effusion. Aortic valve and   coronary artery calcifications.  MEDIASTINUM AND ADE: Left hilar mass, as above. Additional   lymphadenopathy with a reference pretracheal node measuring 0.9 x 0.7 cm   (2:82), stable. A previously referenced node between the left common   carotid artery and the left subclavian artery is not well visualized.  CHEST WALL AND LOWER NECK: Chronic fractures of the right fourth through   eighth ribs.    ABDOMEN AND PELVIS:  LIVER: Within normal limits.  BILE DUCTS: Normal caliber.  GALLBLADDER: Within normal limits.  SPLEEN: Within normal limits.  PANCREAS: A couple pancreatic masses, probable metastatic disease, as   follows:  A mass in the pancreatic body measures 1.1 x 1 cm (2:76), stable.  A dominant heterogeneous mass in the distal body/tail measures 5.5 x 5 x   5.9 cm (2:66), increased from 4.8 x 3.9 x 4.9 cm on 10/7/2022. The mass   is inseparable from the stomach and the splenic artery and vein. New   since prior, a component of the mass versus adjacent lymphadenopathy   directly invades the adjacent splenic vein (2:65). The mass also abuts   the medial margin of the spleen. No main duct dilatation.  ADRENALS: Within normal limits.  KIDNEYS/URETERS: Mild right renal pelvic fullness versus parapelvic   cysts, stable.    BLADDER: Underdistended.  REPRODUCTIVE ORGANS: Normal uterus.    BOWEL: Colonic diverticulosis without acute diverticulitis. No bowel   obstruction. Appendix is not visualized. No evidence of inflammation in   the pericecal region.  PERITONEUM: No ascites.  VESSELS: Atherosclerotic changes. Direct invasion of tumor/tumor thrombus   in the splenic vein, new since prior.  RETROPERITONEUM/LYMPH NODES: Tumor/peripancreatic lymphadenopathy at the   distal pancreas body/tail, as above.  ABDOMINAL WALL: Within normal limits.  BONES: Degenerative changes. Chronic mild compression deformity of L5. A   mild inferior endplate compression deformity of L2, new since 10/7/2022,   age indeterminate.    IMPRESSION:  Since 10/7/2022, there is a mixed response with decrease in size of the   left hilar mass, but increase in size of the pancreatic mass. The   increased distal body/tail pancreatic mass, probable metastatic disease,   and/or adjacent lymphadenopathy now directly invades the splenic vein   with tumor thrombus. The left hilar mass directly invades the left   superior pulmonary vein with tumor thrombus, which was likely present on   prior as the vessel was near completely obscured by tumor at that time.  An L2 inferior endplate compression deformity is new since 10/7/2022, age   indeterminate. Correlate clinically for point tenderness.  --- End of Report ---    < end of copied text >

## 2023-07-28 NOTE — PROGRESS NOTE ADULT - SUBJECTIVE AND OBJECTIVE BOX
date of service: 07-28-23 @ 08:22  afebrile  REVIEW OF SYSTEMS:  CONSTITUTIONAL: No fever,  no  weight loss  ENT:  No  tinnitus,   no   vertigo  NECK: No pain or stiffness  RESPIRATORY: No cough, wheezing, chills or hemoptysis;    No Shortness of Breath  CARDIOVASCULAR: No chest pain, palpitations, dizziness  GASTROINTESTINAL: No abdominal or epigastric pain. No nausea, vomiting, or hematemesis; No diarrhea  No melena or hematochezia.  GENITOURINARY: No dysuria, frequency, hematuria, or incontinence  NEUROLOGICAL: No headaches  SKIN: No itching,  no   rash  LYMPH Nodes: No enlarged glands  ENDOCRINE: No heat or cold intolerance  MUSCULOSKELETAL: No joint pain or swelling  PSYCHIATRIC: No depression, anxiety  HEME/LYMPH: No easy bruising, or bleeding gums  ALLERGY AND IMMUNOLOGIC: No hives or eczema	    MEDICATIONS  (STANDING):  amLODIPine   Tablet 5 milliGRAM(s) Oral daily  heparin   Injectable 5000 Unit(s) SubCutaneous every 12 hours  LORazepam     Tablet 1 milliGRAM(s) Oral once  melatonin 3 milliGRAM(s) Oral at bedtime  metoprolol succinate ER 25 milliGRAM(s) Oral daily  oxyCODONE    IR 5 milliGRAM(s) Oral daily  senna 2 Tablet(s) Oral at bedtime    MEDICATIONS  (PRN):  HYDROmorphone  Injectable 0.5 milliGRAM(s) IV Push every 8 hours PRN Severe Pain (7 - 10)  oxyCODONE    IR 5 milliGRAM(s) Oral three times a day PRN Moderate Pain (4 - 6)      Vital Signs Last 24 Hrs  T(C): 36.8 (28 Jul 2023 04:28), Max: 36.9 (27 Jul 2023 12:32)  T(F): 98.2 (28 Jul 2023 04:28), Max: 98.4 (27 Jul 2023 12:32)  HR: 68 (28 Jul 2023 04:28) (62 - 79)  BP: 131/71 (28 Jul 2023 04:28) (118/68 - 153/68)  BP(mean): --  RR: 18 (28 Jul 2023 04:28) (18 - 18)  SpO2: 96% (28 Jul 2023 04:28) (95% - 96%)    Parameters below as of 28 Jul 2023 04:28  Patient On (Oxygen Delivery Method): room air      CAPILLARY BLOOD GLUCOSE        I&O's Summary    27 Jul 2023 07:01  -  28 Jul 2023 07:00  --------------------------------------------------------  IN: 480 mL / OUT: 0 mL / NET: 480 mL          Appearance: Normal	  HEENT:   Normal oral mucosa, PERRL, EOMI	  Lymphatic: No lymphadenopathy  Cardiovascular: Normal S1 S2, No JVD  Respiratory: Lungs clear to auscultation	  Gastrointestinal:  Soft, Non-tender, + BS	  Skin: No rash, No ecchymoses	  Extremities:     LABS:                        9.3    4.98  )-----------( 114      ( 28 Jul 2023 06:50 )             29.6     07-28    138  |  102  |  14  ----------------------------<  112<H>  3.6   |  25  |  0.54    Ca    9.1      28 Jul 2023 06:52    TPro  7.0  /  Alb  4.2  /  TBili  0.7  /  DBili  x   /  AST  14  /  ALT  10  /  AlkPhos  72  07-26    PT/INR - ( 26 Jul 2023 10:40 )   PT: 11.6 sec;   INR: 1.11 ratio         PTT - ( 26 Jul 2023 10:40 )  PTT:28.0 sec      Urinalysis Basic - ( 28 Jul 2023 06:52 )    Color: x / Appearance: x / SG: x / pH: x  Gluc: 112 mg/dL / Ketone: x  / Bili: x / Urobili: x   Blood: x / Protein: x / Nitrite: x   Leuk Esterase: x / RBC: x / WBC x   Sq Epi: x / Non Sq Epi: x / Bacteria: x                      Consultant(s) Notes Reviewed:      Care Discussed with Consultants/Other Providers:

## 2023-07-28 NOTE — PROGRESS NOTE ADULT - SUBJECTIVE AND OBJECTIVE BOX
77yrs old female with pmhx of SCLC, HTN admitted 7/26/23 with back pain. Patient was seen 7/24/23 for acute L2 compression fx, seen by Neurosx, recommended pain control and MRI but pt did not want to stay in the hospital.  Patient  reported taking tylenol with no relief. No numbness or tingling, no bowel or bladder issues. no weakness. No trauma or injuries.  PAST MEDICAL & SURGICAL HISTORY:  No significant past surgical history        Allergies    No Known Allergies    Intolerances      Social History:    Medications:  amLODIPine   Tablet 5 milliGRAM(s) Oral daily  heparin   Injectable 5000 Unit(s) SubCutaneous every 12 hours  HYDROmorphone  Injectable 0.5 milliGRAM(s) IV Push every 8 hours PRN Severe Pain (7 - 10)  melatonin 3 milliGRAM(s) Oral at bedtime  metoprolol succinate ER 25 milliGRAM(s) Oral daily  oxyCODONE    IR 5 milliGRAM(s) Oral daily  oxyCODONE    IR 5 milliGRAM(s) Oral three times a day PRN Moderate Pain (4 - 6)  senna 2 Tablet(s) Oral at bedtime    Labs:  CBC Full  -  ( 28 Jul 2023 06:50 )  WBC Count : 4.98 K/uL  RBC Count : 4.88 M/uL  Hemoglobin : 9.3 g/dL  Hematocrit : 29.6 %  Platelet Count - Automated : 114 K/uL  Mean Cell Volume : 60.7 fl  Mean Cell Hemoglobin : 19.1 pg  Mean Cell Hemoglobin Concentration : 31.4 gm/dL  Auto Neutrophil # : x  Auto Lymphocyte # : x  Auto Monocyte # : x  Auto Eosinophil # : x  Auto Basophil # : x  Auto Neutrophil % : x  Auto Lymphocyte % : x  Auto Monocyte % : x  Auto Eosinophil % : x  Auto Basophil % : x    07-28    138  |  102  |  14  ----------------------------<  112<H>  3.6   |  25  |  0.54    Ca    9.1      28 Jul 2023 06:52        Radiology:     < from: MR Thoracic Spine w/wo IV Cont (07.28.23 @ 14:39) >  IMPRESSION: Acute inferior endplate compression deformity of L2 appears   benign without bony retropulsion. Increased signal intensity on the   T1-weighted images suggesting increased fatty marrow which may be related   to previous radiation therapy. Dilatation of the central canal from T3   through the conus with focal dilatation at the T12-L1 level where there   appears to be a cystic nonenhancing mass displacing the cord posteriorly   which could represent a small intradural arachnoid cyst, cystic   schwannoma or metastasis in view of the history.    --- End of Report ---        < end of copied text >          ROS:  Patient comfortable without distress  No SOB or chest pain  No palpitation  No abdominal pain, diarrhaea or constipation  No weakness of extremities, back pain needing meds  No skin changes or swelling of legs  Rest of the comprehensive ROS was negative  Vital Signs Last 24 Hrs  T(C): 37 (28 Jul 2023 12:06), Max: 37 (28 Jul 2023 12:06)  T(F): 98.6 (28 Jul 2023 12:06), Max: 98.6 (28 Jul 2023 12:06)  HR: 72 (28 Jul 2023 12:06) (68 - 79)  BP: 107/63 (28 Jul 2023 12:06) (107/63 - 153/68)  BP(mean): --  RR: 18 (28 Jul 2023 12:06) (18 - 18)  SpO2: 94% (28 Jul 2023 12:06) (94% - 96%)    Parameters below as of 28 Jul 2023 12:06  Patient On (Oxygen Delivery Method): room air        Physical exam:  Patient alert and oriented  No distress  CVS: S1, S2   Chest: bilateral breath sound without rales  Abdomen: soft, not tender, no organomegaly or masses  CNS: No focal neuro deficit  Musculoskeletal:  Normal range of motion  Skin: No rash    Assessment and Plan:

## 2023-07-28 NOTE — PROGRESS NOTE ADULT - ASSESSMENT
Patient was admitted in Oct 2022 with SOB, needed left chest tube for pleural effusion and Bx of left lung mass by IR and was dx with extensive stage SCLC. She was treated with Atezolizumab, Carboplatin, Etoposide for 4 cycles starting Nov 29, 2023. PET CT March 9, 2023 showed decrease in lung mass and pancreatic tail mass. She was started on maintenance Atezolizumab in March 21, 2023.  She was admitted 7/26/2023 with back pain   < from: MR Thoracic Spine w/wo IV Cont (07.28.23 @ 14:39) >  IMPRESSION: Acute inferior endplate compression deformity of L2 appears   benign without bony retropulsion. Increased signal intensity on the   T1-weighted images suggesting increased fatty marrow which may be related   to previous radiation therapy. Dilatation of the central canal from T3   through the conus with focal dilatation at the T12-L1 level where there   appears to be a cystic nonenhancing mass displacing the cord posteriorly   which could represent a small intradural arachnoid cyst, cystic   schwannoma or metastasis in view of the history.  Suggest pain control and NS follow up        < from: CT Abdomen and Pelvis w/ IV Cont (07.24.23 @ 12:50) >  < from: CT Chest w/ IV Cont (07.24.23 @ 12:50) >  IMPRESSION:  Since 10/7/2022, there is a mixed response with decrease in size of the   left hilar mass, but increase in size of the pancreatic mass. The   increased distal body/tail pancreatic mass, probable metastatic disease,   and/or adjacent lymphadenopathy now directly invades the splenic vein   with tumor thrombus. The left hilar mass directly invades the left   superior pulmonary vein with tumor thrombus, which was likely present on   prior as the vessel was near completely obscured by tumor at that time.    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 Prognosis from her relapsed malignancy will be poor  Will discuss with patient's son before considering any bx

## 2023-07-29 NOTE — PROGRESS NOTE ADULT - SUBJECTIVE AND OBJECTIVE BOX
date of service: 07-29-23 @ 09:15  afberile  REVIEW OF SYSTEMS:  CONSTITUTIONAL: No fever,  no  weight loss  ENT:  No  tinnitus,   no   vertigo  NECK: No pain or stiffness  RESPIRATORY: No cough, wheezing, chills or hemoptysis;    No Shortness of Breath  CARDIOVASCULAR: No chest pain, palpitations, dizziness  GASTROINTESTINAL: No abdominal or epigastric pain. No nausea, vomiting, or hematemesis; No diarrhea  No melena or hematochezia.  GENITOURINARY: No dysuria, frequency, hematuria, or incontinence  NEUROLOGICAL: No headaches  SKIN: No itching,  no   rash  LYMPH Nodes: No enlarged glands  ENDOCRINE: No heat or cold intolerance  MUSCULOSKELETAL: No joint pain or swelling  PSYCHIATRIC: No depression, anxiety  HEME/LYMPH: No easy bruising, or bleeding gums  ALLERGY AND IMMUNOLOGIC: No hives or eczema	    MEDICATIONS  (STANDING):  amLODIPine   Tablet 5 milliGRAM(s) Oral daily  heparin   Injectable 5000 Unit(s) SubCutaneous every 12 hours  melatonin 3 milliGRAM(s) Oral at bedtime  metoprolol succinate ER 25 milliGRAM(s) Oral daily  oxyCODONE    IR 5 milliGRAM(s) Oral daily  senna 2 Tablet(s) Oral at bedtime    MEDICATIONS  (PRN):  HYDROmorphone  Injectable 0.5 milliGRAM(s) IV Push every 8 hours PRN Severe Pain (7 - 10)  oxyCODONE    IR 5 milliGRAM(s) Oral three times a day PRN Moderate Pain (4 - 6)      Vital Signs Last 24 Hrs  T(C): 36.6 (29 Jul 2023 04:19), Max: 37.2 (28 Jul 2023 19:15)  T(F): 97.9 (29 Jul 2023 04:19), Max: 98.9 (28 Jul 2023 19:15)  HR: 69 (29 Jul 2023 04:19) (69 - 72)  BP: 123/68 (29 Jul 2023 04:19) (107/60 - 123/68)  BP(mean): --  RR: 18 (29 Jul 2023 04:19) (18 - 18)  SpO2: 95% (29 Jul 2023 04:19) (94% - 96%)    Parameters below as of 29 Jul 2023 04:19  Patient On (Oxygen Delivery Method): room air      CAPILLARY BLOOD GLUCOSE        I&O's Summary    28 Jul 2023 07:01  -  29 Jul 2023 07:00  --------------------------------------------------------  IN: 480 mL / OUT: 300 mL / NET: 180 mL          Appearance: Normal	  HEENT:   Normal oral mucosa, PERRL, EOMI	  Lymphatic: No lymphadenopathy  Cardiovascular: Normal S1 S2, No JVD  Respiratory: Lungs clear to auscultation	  Gastrointestinal:  Soft, Non-tender, + BS	  Skin: No rash, No ecchymoses	  Extremities:     LABS:                        9.5    5.60  )-----------( 118      ( 29 Jul 2023 08:11 )             29.8     07-28    138  |  102  |  14  ----------------------------<  112<H>  3.6   |  25  |  0.54    Ca    9.1      28 Jul 2023 06:52            Urinalysis Basic - ( 28 Jul 2023 06:52 )    Color: x / Appearance: x / SG: x / pH: x  Gluc: 112 mg/dL / Ketone: x  / Bili: x / Urobili: x   Blood: x / Protein: x / Nitrite: x   Leuk Esterase: x / RBC: x / WBC x   Sq Epi: x / Non Sq Epi: x / Bacteria: x                      Consultant(s) Notes Reviewed:      Care Discussed with Consultants/Other Providers:

## 2023-07-29 NOTE — PROGRESS NOTE ADULT - ASSESSMENT
77yof      pmhx of Lung cancer, no active treatment, , HTN    h/o  lung cancer. / SCLC,   left  hilar mass.  s/p  chest  tube  2022,  for  pl  effusion/ seen by onc  dr arauz       here with persistent lower back pain. pt seen 2 days ago  in  er,  for acute L2 compression fx, seen by [facility], recommended   MRI but pt did not want to stay in the hospital.      denies  numbness or tingling, no bowel or bladder issues. no   motor weakness./ now  returns   to  er  again with back pain/  denies  fevers/ cp/sob/  abd  pain      back pain,  ct  with  comp fx..  was   seen  by  spine   in er   h/o  SCLC/  metastatic  disease  CT 7/24,,  left   hilar mass, c/c  right  rib   fx/ . tumor   thrombus  in left sup Pulm  V,', old,  and  tumor  thrombus   Splenic V, old  L5,  and new  l2  deformity   onc dr jose. /   on  maintenance ,   Alemtuzumab /  pd l inhibitor    no  a/c  needed,  per  heme  HTN   c/c  anmeia  on dvt  ppx/  s/q  heparin  defer  w/p  to  oncology/ ?  mets  vs  second  primary      mri , comp  fx l2./ mass  t12  to  l1,  displacing cord/  cyst  vs  malignanxy    awiat  N/S  f/p and  oncology  pt  ambulating to bathroom    spoke  with    and son/  pt  is  full code    < from: R Thoracic Spine w/wo IV Cont (07.28.23 @ 14:39) >  IMPRESSION: Acute inferior endplate compression deformity of L2 appears   benign without bony retropulsion. Increased signal intensity on the   T1-weighted images suggesting increased fatty marrow which may be related   to previous radiation therapy. Dilatation of the central canal from T3   through the conus with focal dilatation at the T12-L1 level where there   appears to be a cystic nonenhancing mass displacing the cord posteriorly   which could represent a small intradural arachnoid cyst, cystic   schwannoma or metastasis in view of the history.    --- End of Report ---        < end of copied text >           ra     rad< from: CT Abdomen and Pelvis w/ IV Cont (07.24.23 @ 12:50) >  IFINDINGS:  CHEST:  LUNGS AND LARGE AIRWAYS: Patent central airways. Marked decrease in size   of the left hilar/mediastinal conglomerate mass now measuring 3.5 cm in   craniocaudal dimension (601:52), previously 9 cm. Interval reexpansion of   the left mainstem bronchus and lobar bronchi (previously narrowed by the   confluent tumor). Mildly nodular thickening of the left fissure,   particularly at its peripheral location, contiguous with the hilar mass.   A punctate right lower lobe calcified granuloma.  PLEURA: Interval resolution of the left pleural effusion.  VESSELS: Direct extension of tumorinto the left superior pulmonary vein   with tumor thrombus, likely present on prior as the vessel was near   completely obscured by tumor at that time. Main pulmonary artery is   normal in caliber. Reexpansion of the left main pulmonary artery since  prior (previously narrowed by the confluent tumor). Atherosclerotic   change of a normal caliber thoracic aorta.  HEART: Heart size is normal. No pericardial effusion. Aortic valve and   coronary artery calcifications.  MEDIASTINUM AND ADE: Left hilar mass, as above. Additional   lymphadenopathy with a reference pretracheal node measuring 0.9 x 0.7 cm   (2:82), stable. A previously referenced node between the left common   carotid artery and the left subclavian artery is not well visualized.  CHEST WALL AND LOWER NECK: Chronic fractures of the right fourth through   eighth ribs.    ABDOMEN AND PELVIS:  LIVER: Within normal limits.  BILE DUCTS: Normal caliber.  GALLBLADDER: Within normal limits.  SPLEEN: Within normal limits.  PANCREAS: A couple pancreatic masses, probable metastatic disease, as   follows:  A mass in the pancreatic body measures 1.1 x 1 cm (2:76), stable.  A dominant heterogeneous mass in the distal body/tail measures 5.5 x 5 x   5.9 cm (2:66), increased from 4.8 x 3.9 x 4.9 cm on 10/7/2022. The mass   is inseparable from the stomach and the splenic artery and vein. New   since prior, a component of the mass versus adjacent lymphadenopathy   directly invades the adjacent splenic vein (2:65). The mass also abuts   the medial margin of the spleen. No main duct dilatation.  ADRENALS: Within normal limits.  KIDNEYS/URETERS: Mild right renal pelvic fullness versus parapelvic   cysts, stable.    BLADDER: Underdistended.  REPRODUCTIVE ORGANS: Normal uterus.    BOWEL: Colonic diverticulosis without acute diverticulitis. No bowel   obstruction. Appendix is not visualized. No evidence of inflammation in   the pericecal region.  PERITONEUM: No ascites.  VESSELS: Atherosclerotic changes. Direct invasion of tumor/tumor thrombus   in the splenic vein, new since prior.  RETROPERITONEUM/LYMPH NODES: Tumor/peripancreatic lymphadenopathy at the   distal pancreas body/tail, as above.  ABDOMINAL WALL: Within normal limits.  BONES: Degenerative changes. Chronic mild compression deformity of L5. A   mild inferior endplate compression deformity of L2, new since 10/7/2022,   age indeterminate.    IMPRESSION:  Since 10/7/2022, there is a mixed response with decrease in size of the   left hilar mass, but increase in size of the pancreatic mass. The   increased distal body/tail pancreatic mass, probable metastatic disease,   and/or adjacent lymphadenopathy now directly invades the splenic vein   with tumor thrombus. The left hilar mass directly invades the left   superior pulmonary vein with tumor thrombus, which was likely present on   prior as the vessel was near completely obscured by tumor at that time.  An L2 inferior endplate compression deformity is new since 10/7/2022, age   indeterminate. Correlate clinically for point tenderness.  --- End of Report ---    < end of copied text >

## 2023-07-30 NOTE — PROGRESS NOTE ADULT - ASSESSMENT
77yof      pmhx of Lung cancer, no active treatment, , HTN    h/o  lung cancer. / SCLC,   left  hilar mass.  s/p  chest  tube  2022,  for  pl  effusion/ seen by onc  dr arauz       here with persistent lower back pain. pt seen 2 days ago  in  er,  for acute L2 compression fx, seen by [facility], recommended   MRI but pt did not want to stay in the hospital.      denies  numbness or tingling, no bowel or bladder issues. no   motor weakness./ now  returns   to  er  again with back pain/  denies  fevers/ cp/sob/  abd  pain      back pain,  ct  with  comp fx..  was   seen  by  spine   in er   h/o  SCLC/  metastatic  disease  CT 7/24,,  left   hilar mass, c/c  right  rib   fx/ . tumor   thrombus  in left sup Pulm  V,', old,  and  tumor  thrombus   Splenic V, old  L5,  and new  l2  deformity   onc dr jose. /   on  maintenance ,   Alemtuzumab /  pd l inhibitor    no  a/c  needed,  per  heme  HTN   c/c  anmeia  on dvt  ppx/  s/q  heparin  defer  w/p  to  oncology/ ?  mets  vs  second  primary   MRI.  , comp  fx l2./ mass  t12  to  l1,  displacing cord/  cyst  vs  malignancy      seen by   N/S  f/p and  oncology  pt  ambulating ,   no leg  weakness  awiat  plan of care . pe r oncology    spoke  with    and son/  pt  is  full code    < from: R Thoracic Spine w/wo IV Cont (07.28.23 @ 14:39) >  IMPRESSION: Acute inferior endplate compression deformity of L2 appears   benign without bony retropulsion. Increased signal intensity on the   T1-weighted images suggesting increased fatty marrow which may be related   to previous radiation therapy. Dilatation of the central canal from T3   through the conus with focal dilatation at the T12-L1 level where there   appears to be a cystic nonenhancing mass displacing the cord posteriorly   which could represent a small intradural arachnoid cyst, cystic   schwannoma or metastasis in view of the history.    --- End of Report ---        < end of copied text >           ra     rad< from: CT Abdomen and Pelvis w/ IV Cont (07.24.23 @ 12:50) >  IFINDINGS:  CHEST:  LUNGS AND LARGE AIRWAYS: Patent central airways. Marked decrease in size   of the left hilar/mediastinal conglomerate mass now measuring 3.5 cm in   craniocaudal dimension (601:52), previously 9 cm. Interval reexpansion of   the left mainstem bronchus and lobar bronchi (previously narrowed by the   confluent tumor). Mildly nodular thickening of the left fissure,   particularly at its peripheral location, contiguous with the hilar mass.   A punctate right lower lobe calcified granuloma.  PLEURA: Interval resolution of the left pleural effusion.  VESSELS: Direct extension of tumorinto the left superior pulmonary vein   with tumor thrombus, likely present on prior as the vessel was near   completely obscured by tumor at that time. Main pulmonary artery is   normal in caliber. Reexpansion of the left main pulmonary artery since  prior (previously narrowed by the confluent tumor). Atherosclerotic   change of a normal caliber thoracic aorta.  HEART: Heart size is normal. No pericardial effusion. Aortic valve and   coronary artery calcifications.  MEDIASTINUM AND ADE: Left hilar mass, as above. Additional   lymphadenopathy with a reference pretracheal node measuring 0.9 x 0.7 cm   (2:82), stable. A previously referenced node between the left common   carotid artery and the left subclavian artery is not well visualized.  CHEST WALL AND LOWER NECK: Chronic fractures of the right fourth through   eighth ribs.    ABDOMEN AND PELVIS:  LIVER: Within normal limits.  BILE DUCTS: Normal caliber.  GALLBLADDER: Within normal limits.  SPLEEN: Within normal limits.  PANCREAS: A couple pancreatic masses, probable metastatic disease, as   follows:  A mass in the pancreatic body measures 1.1 x 1 cm (2:76), stable.  A dominant heterogeneous mass in the distal body/tail measures 5.5 x 5 x   5.9 cm (2:66), increased from 4.8 x 3.9 x 4.9 cm on 10/7/2022. The mass   is inseparable from the stomach and the splenic artery and vein. New   since prior, a component of the mass versus adjacent lymphadenopathy   directly invades the adjacent splenic vein (2:65). The mass also abuts   the medial margin of the spleen. No main duct dilatation.  ADRENALS: Within normal limits.  KIDNEYS/URETERS: Mild right renal pelvic fullness versus parapelvic   cysts, stable.    BLADDER: Underdistended.  REPRODUCTIVE ORGANS: Normal uterus.    BOWEL: Colonic diverticulosis without acute diverticulitis. No bowel   obstruction. Appendix is not visualized. No evidence of inflammation in   the pericecal region.  PERITONEUM: No ascites.  VESSELS: Atherosclerotic changes. Direct invasion of tumor/tumor thrombus   in the splenic vein, new since prior.  RETROPERITONEUM/LYMPH NODES: Tumor/peripancreatic lymphadenopathy at the   distal pancreas body/tail, as above.  ABDOMINAL WALL: Within normal limits.  BONES: Degenerative changes. Chronic mild compression deformity of L5. A   mild inferior endplate compression deformity of L2, new since 10/7/2022,   age indeterminate.    IMPRESSION:  Since 10/7/2022, there is a mixed response with decrease in size of the   left hilar mass, but increase in size of the pancreatic mass. The   increased distal body/tail pancreatic mass, probable metastatic disease,   and/or adjacent lymphadenopathy now directly invades the splenic vein   with tumor thrombus. The left hilar mass directly invades the left   superior pulmonary vein with tumor thrombus, which was likely present on   prior as the vessel was near completely obscured by tumor at that time.  An L2 inferior endplate compression deformity is new since 10/7/2022, age   indeterminate. Correlate clinically for point tenderness.  --- End of Report ---    < end of copied text >     77yof      pmhx of Lung cancer, no active treatment, , HTN    h/o  lung cancer. / SCLC,   left  hilar mass.  s/p  chest  tube  2022,  for  pl  effusion/ seen by onc  dr arauz       here with persistent lower back pain. pt seen 2 days ago  in  er,  for acute L2 compression fx, seen by [facility], recommended   MRI but pt did not want to stay in the hospital.      denies  numbness or tingling, no bowel or bladder issues. no   motor weakness./ now  returns   to  er  again with back pain/  denies  fevers/ cp/sob/  abd  pain      back pain,  ct  with  comp fx..  was   seen  by  spine   in er   h/o  SCLC/  metastatic  disease  CT 7/24,,  left   hilar mass, c/c  right  rib   fx/ . tumor   thrombus  in left sup Pulm  V,', old,  and  tumor  thrombus   Splenic V, old  L5,  and new  l2  deformity   onc dr jose. /   on  maintenance ,   Alemtuzumab /  pd l inhibitor    no  a/c  needed,  per  heme  HTN   c/c  anemia on dvt  ppx/  s/q  heparin  defer  w/p  to  oncology/ ?  mets  vs  second  primary   MRI.  , comp  fx l2./ mass  t12  to  l1,  displacing cord/  cyst  vs  malignancy     seen by   N/S  f/p and  oncology  pt  ambulating ,   no leg  weakness   per  oncologist,  need  clarification  from  N/S,  if  mass  is  malignant  or  not   if  mangiant   then per   onc,  will  need  RT   oncologist   will  discuss  with  pt/ son,  if  they  wish  to  pursue  palliative  chemo  or  not  discussed  with team       spoke  with    and son/  pt  is  full code    < from: R Thoracic Spine w/wo IV Cont (07.28.23 @ 14:39) >  IMPRESSION: Acute inferior endplate compression deformity of L2 appears   benign without bony retropulsion. Increased signal intensity on the   T1-weighted images suggesting increased fatty marrow which may be related   to previous radiation therapy. Dilatation of the central canal from T3   through the conus with focal dilatation at the T12-L1 level where there   appears to be a cystic nonenhancing mass displacing the cord posteriorly   which could represent a small intradural arachnoid cyst, cystic   schwannoma or metastasis in view of the history.    --- End of Report ---        < end of copied text >           ra     rad< from: CT Abdomen and Pelvis w/ IV Cont (07.24.23 @ 12:50) >  IFINDINGS:  CHEST:  LUNGS AND LARGE AIRWAYS: Patent central airways. Marked decrease in size   of the left hilar/mediastinal conglomerate mass now measuring 3.5 cm in   craniocaudal dimension (601:52), previously 9 cm. Interval reexpansion of   the left mainstem bronchus and lobar bronchi (previously narrowed by the   confluent tumor). Mildly nodular thickening of the left fissure,   particularly at its peripheral location, contiguous with the hilar mass.   A punctate right lower lobe calcified granuloma.  PLEURA: Interval resolution of the left pleural effusion.  VESSELS: Direct extension of tumorinto the left superior pulmonary vein   with tumor thrombus, likely present on prior as the vessel was near   completely obscured by tumor at that time. Main pulmonary artery is   normal in caliber. Reexpansion of the left main pulmonary artery since  prior (previously narrowed by the confluent tumor). Atherosclerotic   change of a normal caliber thoracic aorta.  HEART: Heart size is normal. No pericardial effusion. Aortic valve and   coronary artery calcifications.  MEDIASTINUM AND ADE: Left hilar mass, as above. Additional   lymphadenopathy with a reference pretracheal node measuring 0.9 x 0.7 cm   (2:82), stable. A previously referenced node between the left common   carotid artery and the left subclavian artery is not well visualized.  CHEST WALL AND LOWER NECK: Chronic fractures of the right fourth through   eighth ribs.    ABDOMEN AND PELVIS:  LIVER: Within normal limits.  BILE DUCTS: Normal caliber.  GALLBLADDER: Within normal limits.  SPLEEN: Within normal limits.  PANCREAS: A couple pancreatic masses, probable metastatic disease, as   follows:  A mass in the pancreatic body measures 1.1 x 1 cm (2:76), stable.  A dominant heterogeneous mass in the distal body/tail measures 5.5 x 5 x   5.9 cm (2:66), increased from 4.8 x 3.9 x 4.9 cm on 10/7/2022. The mass   is inseparable from the stomach and the splenic artery and vein. New   since prior, a component of the mass versus adjacent lymphadenopathy   directly invades the adjacent splenic vein (2:65). The mass also abuts   the medial margin of the spleen. No main duct dilatation.  ADRENALS: Within normal limits.  KIDNEYS/URETERS: Mild right renal pelvic fullness versus parapelvic   cysts, stable.    BLADDER: Underdistended.  REPRODUCTIVE ORGANS: Normal uterus.    BOWEL: Colonic diverticulosis without acute diverticulitis. No bowel   obstruction. Appendix is not visualized. No evidence of inflammation in   the pericecal region.  PERITONEUM: No ascites.  VESSELS: Atherosclerotic changes. Direct invasion of tumor/tumor thrombus   in the splenic vein, new since prior.  RETROPERITONEUM/LYMPH NODES: Tumor/peripancreatic lymphadenopathy at the   distal pancreas body/tail, as above.  ABDOMINAL WALL: Within normal limits.  BONES: Degenerative changes. Chronic mild compression deformity of L5. A   mild inferior endplate compression deformity of L2, new since 10/7/2022,   age indeterminate.    IMPRESSION:  Since 10/7/2022, there is a mixed response with decrease in size of the   left hilar mass, but increase in size of the pancreatic mass. The   increased distal body/tail pancreatic mass, probable metastatic disease,   and/or adjacent lymphadenopathy now directly invades the splenic vein   with tumor thrombus. The left hilar mass directly invades the left   superior pulmonary vein with tumor thrombus, which was likely present on   prior as the vessel was near completely obscured by tumor at that time.  An L2 inferior endplate compression deformity is new since 10/7/2022, age   indeterminate. Correlate clinically for point tenderness.  --- End of Report ---    < end of copied text >

## 2023-07-30 NOTE — PROGRESS NOTE ADULT - SUBJECTIVE AND OBJECTIVE BOX
date of service: 07-30-23 @ 08:07  afberile  REVIEW OF SYSTEMS:  CONSTITUTIONAL: No fever,  no  weight loss  ENT:  No  tinnitus,   no   vertigo  NECK: No pain or stiffness  RESPIRATORY: No cough, wheezing, chills or hemoptysis;    No Shortness of Breath  CARDIOVASCULAR: No chest pain, palpitations, dizziness  GASTROINTESTINAL: No abdominal or epigastric pain. No nausea, vomiting, or hematemesis; No diarrhea  No melena or hematochezia.  GENITOURINARY: No dysuria, frequency, hematuria, or incontinence  NEUROLOGICAL: No headaches  SKIN: No itching,  no   rash  LYMPH Nodes: No enlarged glands  ENDOCRINE: No heat or cold intolerance  MUSCULOSKELETAL: No joint pain or swelling  PSYCHIATRIC: No depression, anxiety  HEME/LYMPH: No easy bruising, or bleeding gums  ALLERGY AND IMMUNOLOGIC: No hives or eczema	    MEDICATIONS  (STANDING):  amLODIPine   Tablet 5 milliGRAM(s) Oral daily  heparin   Injectable 5000 Unit(s) SubCutaneous every 12 hours  melatonin 3 milliGRAM(s) Oral at bedtime  metoprolol succinate ER 25 milliGRAM(s) Oral daily  oxyCODONE    IR 5 milliGRAM(s) Oral daily  senna 2 Tablet(s) Oral at bedtime    MEDICATIONS  (PRN):  HYDROmorphone  Injectable 0.5 milliGRAM(s) IV Push every 8 hours PRN Severe Pain (7 - 10)  oxyCODONE    IR 5 milliGRAM(s) Oral three times a day PRN Moderate Pain (4 - 6)      Vital Signs Last 24 Hrs  T(C): 36.9 (30 Jul 2023 04:16), Max: 37.1 (29 Jul 2023 21:19)  T(F): 98.4 (30 Jul 2023 04:16), Max: 98.7 (29 Jul 2023 21:19)  HR: 61 (30 Jul 2023 04:16) (59 - 69)  BP: 123/73 (30 Jul 2023 04:16) (117/50 - 123/73)  BP(mean): --  RR: 18 (30 Jul 2023 04:16) (18 - 18)  SpO2: 94% (30 Jul 2023 04:16) (94% - 95%)    Parameters below as of 30 Jul 2023 04:16  Patient On (Oxygen Delivery Method): room air      CAPILLARY BLOOD GLUCOSE        I&O's Summary        Appearance: Normal	  HEENT:   Normal oral mucosa, PERRL, EOMI	  Lymphatic: No lymphadenopathy  Cardiovascular: Normal S1 S2, No JVD  Respiratory: Lungs clear to auscultation	  Gastrointestinal:  Soft, Non-tender, + BS	  Skin: No rash, No ecchymoses	  Extremities:     LABS:                        9.5    5.60  )-----------( 118      ( 29 Jul 2023 08:11 )             29.8                                   Consultant(s) Notes Reviewed:      Care Discussed with Consultants/Other Providers:

## 2023-07-31 NOTE — PROGRESS NOTE ADULT - ASSESSMENT
77yof      pmhx of Lung cancer, no active treatment, , HTN    h/o  lung cancer. / SCLC,   left  hilar mass.  s/p  chest  tube  2022,  for  pl  effusion/ seen by onc  dr arauz       here with persistent lower back pain. pt seen 2 days ago  in  er,  for acute L2 compression fx, seen by Neurosx, recommended   MRI but pt did not want to stay in the hospital.      denies  numbness or tingling, no bowel or bladder issues. no   motor weakness./ now  returns   to  er  again with back pain/  denies  fevers/ cp/sob/  abd  pain      back pain,  ct  with  comp fx..  was   seen  by  spine   in er   h/o  SCLC/  metastatic  disease  CT 7/24,,  left   hilar mass, c/c  right  rib   fx/ . tumor   thrombus  in left sup Pulm  V,', old,  and  tumor  thrombus   Splenic V, old  L5,  and new  l2  deformity   onc dr jose. /   on  maintenance ,   Alemtuzumab /  pd l inhibitor    no  a/c  needed,  per  heme  HTN   c/c  anemia on dvt  ppx/  s/q  heparin  defer  w/p  to  oncology/ ?  mets  vs  second  primary   MRI.  , comp  fx l2./ mass  t12  to  l1,  displacing cord/  cyst  vs  malignancy   pt  ambulating ,   no leg  weakness   per  oncologist,  need  clarification  from  N/S,  if  mass  is  malignant  or  not   if  mangiant   then per   onc,  will  need  RT   oncologist   will  discuss  with  pt/ son,  if  they  wish  to  pursue  palliative  chemo  or  not   awiating N/S  f/p.  discussed  with   team  today         spoke  with    and son/  pt  is  full code    < from: R Thoracic Spine w/wo IV Cont (07.28.23 @ 14:39) >  IMPRESSION: Acute inferior endplate compression deformity of L2 appears   benign without bony retropulsion. Increased signal intensity on the   T1-weighted images suggesting increased fatty marrow which may be related   to previous radiation therapy. Dilatation of the central canal from T3   through the conus with focal dilatation at the T12-L1 level where there   appears to be a cystic nonenhancing mass displacing the cord posteriorly   which could represent a small intradural arachnoid cyst, cystic   schwannoma or metastasis in view of the history.    --- End of Report ---        < end of copied text >           ra     rad< from: CT Abdomen and Pelvis w/ IV Cont (07.24.23 @ 12:50) >  IFINDINGS:  CHEST:  LUNGS AND LARGE AIRWAYS: Patent central airways. Marked decrease in size   of the left hilar/mediastinal conglomerate mass now measuring 3.5 cm in   craniocaudal dimension (601:52), previously 9 cm. Interval reexpansion of   the left mainstem bronchus and lobar bronchi (previously narrowed by the   confluent tumor). Mildly nodular thickening of the left fissure,   particularly at its peripheral location, contiguous with the hilar mass.   A punctate right lower lobe calcified granuloma.  PLEURA: Interval resolution of the left pleural effusion.  VESSELS: Direct extension of tumorinto the left superior pulmonary vein   with tumor thrombus, likely present on prior as the vessel was near   completely obscured by tumor at that time. Main pulmonary artery is   normal in caliber. Reexpansion of the left main pulmonary artery since  prior (previously narrowed by the confluent tumor). Atherosclerotic   change of a normal caliber thoracic aorta.  HEART: Heart size is normal. No pericardial effusion. Aortic valve and   coronary artery calcifications.  MEDIASTINUM AND ADE: Left hilar mass, as above. Additional   lymphadenopathy with a reference pretracheal node measuring 0.9 x 0.7 cm   (2:82), stable. A previously referenced node between the left common   carotid artery and the left subclavian artery is not well visualized.  CHEST WALL AND LOWER NECK: Chronic fractures of the right fourth through   eighth ribs.    ABDOMEN AND PELVIS:  LIVER: Within normal limits.  BILE DUCTS: Normal caliber.  GALLBLADDER: Within normal limits.  SPLEEN: Within normal limits.  PANCREAS: A couple pancreatic masses, probable metastatic disease, as   follows:  A mass in the pancreatic body measures 1.1 x 1 cm (2:76), stable.  A dominant heterogeneous mass in the distal body/tail measures 5.5 x 5 x   5.9 cm (2:66), increased from 4.8 x 3.9 x 4.9 cm on 10/7/2022. The mass   is inseparable from the stomach and the splenic artery and vein. New   since prior, a component of the mass versus adjacent lymphadenopathy   directly invades the adjacent splenic vein (2:65). The mass also abuts   the medial margin of the spleen. No main duct dilatation.  ADRENALS: Within normal limits.  KIDNEYS/URETERS: Mild right renal pelvic fullness versus parapelvic   cysts, stable.    BLADDER: Underdistended.  REPRODUCTIVE ORGANS: Normal uterus.    BOWEL: Colonic diverticulosis without acute diverticulitis. No bowel   obstruction. Appendix is not visualized. No evidence of inflammation in   the pericecal region.  PERITONEUM: No ascites.  VESSELS: Atherosclerotic changes. Direct invasion of tumor/tumor thrombus   in the splenic vein, new since prior.  RETROPERITONEUM/LYMPH NODES: Tumor/peripancreatic lymphadenopathy at the   distal pancreas body/tail, as above.  ABDOMINAL WALL: Within normal limits.  BONES: Degenerative changes. Chronic mild compression deformity of L5. A   mild inferior endplate compression deformity of L2, new since 10/7/2022,   age indeterminate.    IMPRESSION:  Since 10/7/2022, there is a mixed response with decrease in size of the   left hilar mass, but increase in size of the pancreatic mass. The   increased distal body/tail pancreatic mass, probable metastatic disease,   and/or adjacent lymphadenopathy now directly invades the splenic vein   with tumor thrombus. The left hilar mass directly invades the left   superior pulmonary vein with tumor thrombus, which was likely present on   prior as the vessel was near completely obscured by tumor at that time.  An L2 inferior endplate compression deformity is new since 10/7/2022, age   indeterminate. Correlate clinically for point tenderness.  --- End of Report ---    < end of copied text >

## 2023-07-31 NOTE — PROGRESS NOTE ADULT - SUBJECTIVE AND OBJECTIVE BOX
date of service: 07-31-23 @ 08:32  afberile  REVIEW OF SYSTEMS:  CONSTITUTIONAL: No fever,  no  weight loss  ENT:  No  tinnitus,   no   vertigo  NECK: No pain or stiffness  RESPIRATORY: No cough, wheezing, chills or hemoptysis;    No Shortness of Breath  CARDIOVASCULAR: No chest pain, palpitations, dizziness  GASTROINTESTINAL: No abdominal or epigastric pain. No nausea, vomiting, or hematemesis; No diarrhea  No melena or hematochezia.  GENITOURINARY: No dysuria, frequency, hematuria, or incontinence  NEUROLOGICAL: No headaches  SKIN: No itching,  no   rash  LYMPH Nodes: No enlarged glands  ENDOCRINE: No heat or cold intolerance  MUSCULOSKELETAL: No joint pain or swelling  PSYCHIATRIC: No depression, anxiety  HEME/LYMPH: No easy bruising, or bleeding gums  ALLERGY AND IMMUNOLOGIC: No hives or eczema	    MEDICATIONS  (STANDING):  amLODIPine   Tablet 5 milliGRAM(s) Oral daily  heparin   Injectable 5000 Unit(s) SubCutaneous every 12 hours  melatonin 3 milliGRAM(s) Oral at bedtime  metoprolol succinate ER 25 milliGRAM(s) Oral daily  oxyCODONE    IR 5 milliGRAM(s) Oral daily  senna 2 Tablet(s) Oral at bedtime    MEDICATIONS  (PRN):  HYDROmorphone  Injectable 0.5 milliGRAM(s) IV Push every 8 hours PRN Severe Pain (7 - 10)  oxyCODONE    IR 5 milliGRAM(s) Oral three times a day PRN Moderate Pain (4 - 6)      Vital Signs Last 24 Hrs  T(C): 36.7 (31 Jul 2023 06:05), Max: 36.9 (30 Jul 2023 21:51)  T(F): 98 (31 Jul 2023 06:05), Max: 98.4 (30 Jul 2023 21:51)  HR: 63 (31 Jul 2023 06:05) (57 - 65)  BP: 111/65 (31 Jul 2023 06:05) (111/65 - 132/71)  BP(mean): --  RR: 18 (31 Jul 2023 06:05) (17 - 18)  SpO2: 95% (31 Jul 2023 06:05) (95% - 98%)    Parameters below as of 31 Jul 2023 06:05  Patient On (Oxygen Delivery Method): room air      CAPILLARY BLOOD GLUCOSE        I&O's Summary    30 Jul 2023 07:01  -  31 Jul 2023 07:00  --------------------------------------------------------  IN: 380 mL / OUT: 400 mL / NET: -20 mL          Appearance: Normal	  HEENT:   Normal oral mucosa, PERRL, EOMI	  Lymphatic: No lymphadenopathy  Cardiovascular: Normal S1 S2, No JVD  Respiratory: Lungs clear to auscultation	  Gastrointestinal:  Soft, Non-tender, + BS	  Skin: No rash, No ecchymoses	  Extremities:     LABS:                        9.7    6.02  )-----------( 119      ( 31 Jul 2023 06:33 )             30.9     07-31    133<L>  |  101  |  20  ----------------------------<  95  5.0   |  19<L>  |  0.66    Ca    9.2      31 Jul 2023 06:33            Urinalysis Basic - ( 31 Jul 2023 06:33 )    Color: x / Appearance: x / SG: x / pH: x  Gluc: 95 mg/dL / Ketone: x  / Bili: x / Urobili: x   Blood: x / Protein: x / Nitrite: x   Leuk Esterase: x / RBC: x / WBC x   Sq Epi: x / Non Sq Epi: x / Bacteria: x                      Consultant(s) Notes Reviewed:      Care Discussed with Consultants/Other Providers:

## 2023-07-31 NOTE — PROGRESS NOTE ADULT - SUBJECTIVE AND OBJECTIVE BOX
77yrs old female with pmhx of SCLC, HTN admitted 7/26/23 with back pain. Patient was seen 7/24/23 for acute L2 compression fx, seen by Neurosx, recommended pain control and MRI but pt did not want to stay in the hospital.  Patient  reported taking tylenol with no relief. No numbness or tingling, no bowel or bladder issues. no weakness. No trauma or injuries.    PAST MEDICAL & SURGICAL HISTORY:  No significant past surgical history        Allergies    No Known Allergies    Intolerances      Social History:    Medications:  amLODIPine   Tablet 5 milliGRAM(s) Oral daily  heparin   Injectable 5000 Unit(s) SubCutaneous every 12 hours  HYDROmorphone  Injectable 0.5 milliGRAM(s) IV Push every 8 hours PRN Severe Pain (7 - 10)  melatonin 3 milliGRAM(s) Oral at bedtime  metoprolol succinate ER 25 milliGRAM(s) Oral daily  oxyCODONE    IR 5 milliGRAM(s) Oral daily  oxyCODONE    IR 5 milliGRAM(s) Oral three times a day PRN Moderate Pain (4 - 6)  senna 2 Tablet(s) Oral at bedtime    Labs:  CBC Full  -  ( 31 Jul 2023 06:33 )  WBC Count : 6.02 K/uL  RBC Count : 5.02 M/uL  Hemoglobin : 9.7 g/dL  Hematocrit : 30.9 %  Platelet Count - Automated : 119 K/uL  Mean Cell Volume : 61.6 fl  Mean Cell Hemoglobin : 19.3 pg  Mean Cell Hemoglobin Concentration : 31.4 gm/dL  Auto Neutrophil # : x  Auto Lymphocyte # : x  Auto Monocyte # : x  Auto Eosinophil # : x  Auto Basophil # : x  Auto Neutrophil % : x  Auto Lymphocyte % : x  Auto Monocyte % : x  Auto Eosinophil % : x  Auto Basophil % : x    07-31    133<L>  |  101  |  20  ----------------------------<  95  5.0   |  19<L>  |  0.66    Ca    9.2      31 Jul 2023 06:33        Radiology:             ROS:  Patient comfortable without distress  No SOB or chest pain  No palpitation  No abdominal pain, diarrhaea or constipation  No weakness of extremities, back pain+  No skin changes or swelling of legs  Rest of the comprehensive ROS was negative  Vital Signs Last 24 Hrs  T(C): 36.7 (31 Jul 2023 06:05), Max: 36.9 (30 Jul 2023 21:51)  T(F): 98 (31 Jul 2023 06:05), Max: 98.4 (30 Jul 2023 21:51)  HR: 60 (31 Jul 2023 09:20) (57 - 63)  BP: 122/58 (31 Jul 2023 09:20) (111/65 - 132/71)  BP(mean): --  RR: 18 (31 Jul 2023 06:05) (17 - 18)  SpO2: 95% (31 Jul 2023 06:05) (95% - 98%)    Parameters below as of 31 Jul 2023 06:05  Patient On (Oxygen Delivery Method): room air        Physical exam:  Patient alert and oriented  No distress  CVS: S1, S2   Chest: bilateral breath sound without rales  Abdomen: soft, not tender, no organomegaly or masses  CNS: No focal neuro deficit  Musculoskeletal:  Normal range of motion  Skin: No rash    Assessment and Plan:

## 2023-07-31 NOTE — PROGRESS NOTE ADULT - ASSESSMENT
Patient was admitted in Oct 2022 with SOB, needed left chest tube for pleural effusion and Bx of left lung mass by IR and was dx with extensive stage SCLC. She was treated with Atezolizumab, Carboplatin, Etoposide for 4 cycles starting Nov 29, 2023. PET CT March 9, 2023 showed decrease in lung mass and pancreatic tail mass. She was started on maintenance Atezolizumab in March 21, 2023.  She was admitted 7/26/2023 with back pain   < from: MR Thoracic Spine w/wo IV Cont (07.28.23 @ 14:39) >  IMPRESSION: Acute inferior endplate compression deformity of L2 appears   benign without bony retropulsion. Increased signal intensity on the   T1-weighted images suggesting increased fatty marrow which may be related   to previous radiation therapy. Dilatation of the central canal from T3   through the conus with focal dilatation at the T12-L1 level where there   appears to be a cystic nonenhancing mass displacing the cord posteriorly   which could represent a small intradural arachnoid cyst, cystic   schwannoma or metastasis in view of the history.  Suggest pain control and NS follow up        < from: CT Abdomen and Pelvis w/ IV Cont (07.24.23 @ 12:50) >  < from: CT Chest w/ IV Cont (07.24.23 @ 12:50) >  IMPRESSION:  Since 10/7/2022, there is a mixed response with decrease in size of the   left hilar mass, but increase in size of the pancreatic mass. The   increased distal body/tail pancreatic mass, probable metastatic disease,   and/or adjacent lymphadenopathy now directly invades the splenic vein   with tumor thrombus. The left hilar mass directly invades the left   superior pulmonary vein with tumor thrombus, which was likely present on   prior as the vessel was near completely obscured by tumor at that time.  Patient has tumor thrombi by imaging not needing AC  MRI spine noted< from: MR Thoracic Spine w/wo IV Cont (07.28.23 @ 14:39) >  IMPRESSION: Acute inferior endplate compression deformity of L2 appears   benign without bony retropulsion. Increased signal intensity on the   T1-weighted images suggesting increased fatty marrow which may be related   to previous radiation therapy. Dilatation of the central canal from T3   through the conus with focal dilatation at the T12-L1 level where there   appears to be a cystic nonenhancing mass displacing the cord posteriorly   which could represent a small intradural arachnoid cyst, cystic   schwannoma or metastasis in view of the history.    This does not given enough evidence of metastatic disease to suggest RT. Get MRI brain as that may help in planning management. NS opinino regarding dd or spine lesion and ? possibility of tissue dx specially if MRI brain OK.     CT likely represents disease progression for which Lubrinectin etc can be considered but prognosis is poor  Discussed in detail plan with patient's son

## 2023-08-01 NOTE — PROGRESS NOTE ADULT - ASSESSMENT
77yof      pmhx of Lung cancer, no active treatment, , HTN    h/o  lung cancer. / SCLC,   left  hilar mass.  s/p  chest  tube  2022,  for  pl  effusion/ seen by onc  dr arauz       here with persistent lower back pain. pt seen 2 days ago  in  er,  for acute L2 compression fx, seen by [facility], recommended   MRI but pt did not want to stay in the hospital.      denies  numbness or tingling, no bowel or bladder issues. no   motor weakness./ now  returns   to  er  again with back pain/  denies  fevers/ cp/sob/  abd  pain      back pain,  ct  with  comp fx..  was   seen  by  spine   in er   h/o  SCLC/  metastatic  disease  CT 7/24,,  left   hilar mass, c/c  right  rib   fx/ . tumor   thrombus  in left sup Pulm  V,', old,  and  tumor  thrombus   Splenic V, old  L5,  and new  l2  deformity   onc dr jose. /   on  maintenance ,   Alemtuzumab /  pd l inhibitor    no  a/c  needed,  per  heme  HTN   c/c  anemia on dvt  ppx/  s/q  heparin  defer  w/p  to  oncology/ ?  mets  vs  second  primary   MRI.  , comp  fx l2./ mass  t12  to  l1,  displacing cord/  cyst  vs  malignancy   pt  ambulating ,   no leg  weakness   per  oncologist,  need  clarification  from  N/S,  if  mass  is  malignant  or  not   if  mangiant   then per   onc,  will  need  RT   oncologist   will  discuss  with  pt/ son,  if  they  wish  to  pursue  palliative  chemo  or  not     still  awiating N/S  f/p.    mri head, pending         spoke  with    and son/  pt  is  full code    < from: R Thoracic Spine w/wo IV Cont (07.28.23 @ 14:39) >  IMPRESSION: Acute inferior endplate compression deformity of L2 appears   benign without bony retropulsion. Increased signal intensity on the   T1-weighted images suggesting increased fatty marrow which may be related   to previous radiation therapy. Dilatation of the central canal from T3   through the conus with focal dilatation at the T12-L1 level where there   appears to be a cystic nonenhancing mass displacing the cord posteriorly   which could represent a small intradural arachnoid cyst, cystic   schwannoma or metastasis in view of the history.    --- End of Report ---        < end of copied text >           ra     rad< from: CT Abdomen and Pelvis w/ IV Cont (07.24.23 @ 12:50) >  IFINDINGS:  CHEST:  LUNGS AND LARGE AIRWAYS: Patent central airways. Marked decrease in size   of the left hilar/mediastinal conglomerate mass now measuring 3.5 cm in   craniocaudal dimension (601:52), previously 9 cm. Interval reexpansion of   the left mainstem bronchus and lobar bronchi (previously narrowed by the   confluent tumor). Mildly nodular thickening of the left fissure,   particularly at its peripheral location, contiguous with the hilar mass.   A punctate right lower lobe calcified granuloma.  PLEURA: Interval resolution of the left pleural effusion.  VESSELS: Direct extension of tumorinto the left superior pulmonary vein   with tumor thrombus, likely present on prior as the vessel was near   completely obscured by tumor at that time. Main pulmonary artery is   normal in caliber. Reexpansion of the left main pulmonary artery since  prior (previously narrowed by the confluent tumor). Atherosclerotic   change of a normal caliber thoracic aorta.  HEART: Heart size is normal. No pericardial effusion. Aortic valve and   coronary artery calcifications.  MEDIASTINUM AND ADE: Left hilar mass, as above. Additional   lymphadenopathy with a reference pretracheal node measuring 0.9 x 0.7 cm   (2:82), stable. A previously referenced node between the left common   carotid artery and the left subclavian artery is not well visualized.  CHEST WALL AND LOWER NECK: Chronic fractures of the right fourth through   eighth ribs.    ABDOMEN AND PELVIS:  LIVER: Within normal limits.  BILE DUCTS: Normal caliber.  GALLBLADDER: Within normal limits.  SPLEEN: Within normal limits.  PANCREAS: A couple pancreatic masses, probable metastatic disease, as   follows:  A mass in the pancreatic body measures 1.1 x 1 cm (2:76), stable.  A dominant heterogeneous mass in the distal body/tail measures 5.5 x 5 x   5.9 cm (2:66), increased from 4.8 x 3.9 x 4.9 cm on 10/7/2022. The mass   is inseparable from the stomach and the splenic artery and vein. New   since prior, a component of the mass versus adjacent lymphadenopathy   directly invades the adjacent splenic vein (2:65). The mass also abuts   the medial margin of the spleen. No main duct dilatation.  ADRENALS: Within normal limits.  KIDNEYS/URETERS: Mild right renal pelvic fullness versus parapelvic   cysts, stable.    BLADDER: Underdistended.  REPRODUCTIVE ORGANS: Normal uterus.    BOWEL: Colonic diverticulosis without acute diverticulitis. No bowel   obstruction. Appendix is not visualized. No evidence of inflammation in   the pericecal region.  PERITONEUM: No ascites.  VESSELS: Atherosclerotic changes. Direct invasion of tumor/tumor thrombus   in the splenic vein, new since prior.  RETROPERITONEUM/LYMPH NODES: Tumor/peripancreatic lymphadenopathy at the   distal pancreas body/tail, as above.  ABDOMINAL WALL: Within normal limits.  BONES: Degenerative changes. Chronic mild compression deformity of L5. A   mild inferior endplate compression deformity of L2, new since 10/7/2022,   age indeterminate.    IMPRESSION:  Since 10/7/2022, there is a mixed response with decrease in size of the   left hilar mass, but increase in size of the pancreatic mass. The   increased distal body/tail pancreatic mass, probable metastatic disease,   and/or adjacent lymphadenopathy now directly invades the splenic vein   with tumor thrombus. The left hilar mass directly invades the left   superior pulmonary vein with tumor thrombus, which was likely present on   prior as the vessel was near completely obscured by tumor at that time.  An L2 inferior endplate compression deformity is new since 10/7/2022, age   indeterminate. Correlate clinically for point tenderness.  --- End of Report ---    < end of copied text >     77yof      pmhx of Lung cancer, no active treatment, , HTN    h/o  lung cancer. / SCLC,   left  hilar mass.  s/p  chest  tube  2022,  for  pl  effusion/ seen by onc  dr arauz       here with persistent lower back pain. pt seen 2 days ago  in  er,  for acute L2 compression fx, seen by [facility], recommended   MRI but pt did not want to stay in the hospital.      denies  numbness or tingling, no bowel or bladder issues. no   motor weakness./ now  returns   to  er  again with back pain/  denies  fevers/ cp/sob/  abd  pain      back pain,  ct  with  comp fx..  was   seen  by  spine   in er   h/o  SCLC/  metastatic  disease  CT 7/24,,  left   hilar mass, c/c  right  rib   fx/ . tumor   thrombus  in left sup Pulm  V,', old,  and  tumor  thrombus   Splenic V, old  L5,  and new  l2  deformity   onc dr jose. /   on  maintenance ,   Alemtuzumab /  pd l inhibitor    no  a/c  needed,  per  heme  HTN   c/c  anemia on dvt  ppx/  s/q  heparin  defer  w/p  to  oncology/ ?  mets  vs  second  primary   MRI.  , comp  fx l2./ mass  t12  to  l1,  displacing cord/  cyst  vs  malignancy   pt  ambulating ,   no leg  weakness   per  oncologist,  need  clarification  from  N/S,  if  mass  is  malignant  or  not   if  mangiant   then per   onc,  will  need  RT   oncologist    has discussed  palliative   chemo  with  son      awiating  N/S  f/p. /  spoke  with resident,  team will  f/p    mri head, pending         spoke  with    and son/  pt  is  full code    < from: R Thoracic Spine w/wo IV Cont (07.28.23 @ 14:39) >  IMPRESSION: Acute inferior endplate compression deformity of L2 appears   benign without bony retropulsion. Increased signal intensity on the   T1-weighted images suggesting increased fatty marrow which may be related   to previous radiation therapy. Dilatation of the central canal from T3   through the conus with focal dilatation at the T12-L1 level where there   appears to be a cystic nonenhancing mass displacing the cord posteriorly   which could represent a small intradural arachnoid cyst, cystic   schwannoma or metastasis in view of the history.    --- End of Report ---       rad< from: CT Abdomen and Pelvis w/ IV Cont (07.24.23 @ 12:50) >  IFINDINGS:  CHEST:  LUNGS AND LARGE AIRWAYS: Patent central airways. Marked decrease in size   of the left hilar/mediastinal conglomerate mass now measuring 3.5 cm in   craniocaudal dimension (601:52), previously 9 cm. Interval reexpansion of   the left mainstem bronchus and lobar bronchi (previously narrowed by the   confluent tumor). Mildly nodular thickening of the left fissure,   particularly at its peripheral location, contiguous with the hilar mass.   A punctate right lower lobe calcified granuloma.  PLEURA: Interval resolution of the left pleural effusion.  VESSELS: Direct extension of tumorinto the left superior pulmonary vein   with tumor thrombus, likely present on prior as the vessel was near   completely obscured by tumor at that time. Main pulmonary artery is   normal in caliber. Reexpansion of the left main pulmonary artery since  prior (previously narrowed by the confluent tumor). Atherosclerotic   change of a normal caliber thoracic aorta.  HEART: Heart size is normal. No pericardial effusion. Aortic valve and   coronary artery calcifications.  MEDIASTINUM AND ADE: Left hilar mass, as above. Additional   lymphadenopathy with a reference pretracheal node measuring 0.9 x 0.7 cm   (2:82), stable. A previously referenced node between the left common   carotid artery and the left subclavian artery is not well visualized.  CHEST WALL AND LOWER NECK: Chronic fractures of the right fourth through   eighth ribs.    ABDOMEN AND PELVIS:  LIVER: Within normal limits.  BILE DUCTS: Normal caliber.  GALLBLADDER: Within normal limits.  SPLEEN: Within normal limits.  PANCREAS: A couple pancreatic masses, probable metastatic disease, as   follows:  A mass in the pancreatic body measures 1.1 x 1 cm (2:76), stable.  A dominant heterogeneous mass in the distal body/tail measures 5.5 x 5 x   5.9 cm (2:66), increased from 4.8 x 3.9 x 4.9 cm on 10/7/2022. The mass   is inseparable from the stomach and the splenic artery and vein. New   since prior, a component of the mass versus adjacent lymphadenopathy   directly invades the adjacent splenic vein (2:65). The mass also abuts   the medial margin of the spleen. No main duct dilatation.  ADRENALS: Within normal limits.  KIDNEYS/URETERS: Mild right renal pelvic fullness versus parapelvic   cysts, stable.    BLADDER: Underdistended.  REPRODUCTIVE ORGANS: Normal uterus.    BOWEL: Colonic diverticulosis without acute diverticulitis. No bowel   obstruction. Appendix is not visualized. No evidence of inflammation in   the pericecal region.  PERITONEUM: No ascites.  VESSELS: Atherosclerotic changes. Direct invasion of tumor/tumor thrombus   in the splenic vein, new since prior.  RETROPERITONEUM/LYMPH NODES: Tumor/peripancreatic lymphadenopathy at the   distal pancreas body/tail, as above.  ABDOMINAL WALL: Within normal limits.  BONES: Degenerative changes. Chronic mild compression deformity of L5. A   mild inferior endplate compression deformity of L2, new since 10/7/2022,   age indeterminate.    IMPRESSION:  Since 10/7/2022, there is a mixed response with decrease in size of the   left hilar mass, but increase in size of the pancreatic mass. The   increased distal body/tail pancreatic mass, probable metastatic disease,   and/or adjacent lymphadenopathy now directly invades the splenic vein   with tumor thrombus. The left hilar mass directly invades the left   superior pulmonary vein with tumor thrombus, which was likely present on   prior as the vessel was near completely obscured by tumor at that time.  An L2 inferior endplate compression deformity is new since 10/7/2022, age   indeterminate. Correlate clinically for point tenderness.  --- End of Report ---    < end of copied text >     77yof      pmhx of Lung cancer, no active treatment, , HTN    h/o  lung cancer. / SCLC,   left  hilar mass.  s/p  chest  tube  2022,  for  pl  effusion/ seen by onc  dr arauz       here with persistent lower back pain. pt seen 2 days ago  in  er,  for acute L2 compression fx, seen by [facility], recommended   MRI but pt did not want to stay in the hospital.      denies  numbness or tingling, no bowel or bladder issues. no   motor weakness./ now  returns   to  er  again with back pain/  denies  fevers/ cp/sob/  abd  pain      back pain,  ct  with  comp fx..  was   seen  by  spine   in er   h/o  SCLC/  metastatic  disease  CT 7/24,,  left   hilar mass, c/c  right  rib   fx/ . tumor   thrombus  in left sup Pulm  V,', old,  and  tumor  thrombus   Splenic V, old  L5,  and new  l2  deformity   onc dr jose. /   on  maintenance ,   Alemtuzumab /  pd l inhibitor    no  a/c  needed,  per  heme  HTN   c/c  anemia on dvt  ppx/  s/q  heparin  defer  w/p  to  oncology/    MRI.  , comp  fx l2./ mass  t12  to  l1,  displacing cord/  cyst  vs  malignancy   pt  ambulating ,   no leg  weakness  oncologist    has discussed   with son       awiating  N/S  f/p.  as  oncologist   wants  clarification on mri       spoke  with resident,  team will  f/p    mri head, pending         spoke  with    and son/  pt  is  full code    < from: R Thoracic Spine w/wo IV Cont (07.28.23 @ 14:39) >  IMPRESSION: Acute inferior endplate compression deformity of L2 appears   benign without bony retropulsion. Increased signal intensity on the   T1-weighted images suggesting increased fatty marrow which may be related   to previous radiation therapy. Dilatation of the central canal from T3   through the conus with focal dilatation at the T12-L1 level where there   appears to be a cystic nonenhancing mass displacing the cord posteriorly   which could represent a small intradural arachnoid cyst, cystic   schwannoma or metastasis in view of the history.    --- End of Report ---       rad< from: CT Abdomen and Pelvis w/ IV Cont (07.24.23 @ 12:50) >  IFINDINGS:  CHEST:  LUNGS AND LARGE AIRWAYS: Patent central airways. Marked decrease in size   of the left hilar/mediastinal conglomerate mass now measuring 3.5 cm in   craniocaudal dimension (601:52), previously 9 cm. Interval reexpansion of   the left mainstem bronchus and lobar bronchi (previously narrowed by the   confluent tumor). Mildly nodular thickening of the left fissure,   particularly at its peripheral location, contiguous with the hilar mass.   A punctate right lower lobe calcified granuloma.  PLEURA: Interval resolution of the left pleural effusion.  VESSELS: Direct extension of tumorinto the left superior pulmonary vein   with tumor thrombus, likely present on prior as the vessel was near   completely obscured by tumor at that time. Main pulmonary artery is   normal in caliber. Reexpansion of the left main pulmonary artery since  prior (previously narrowed by the confluent tumor). Atherosclerotic   change of a normal caliber thoracic aorta.  HEART: Heart size is normal. No pericardial effusion. Aortic valve and   coronary artery calcifications.  MEDIASTINUM AND ADE: Left hilar mass, as above. Additional   lymphadenopathy with a reference pretracheal node measuring 0.9 x 0.7 cm   (2:82), stable. A previously referenced node between the left common   carotid artery and the left subclavian artery is not well visualized.  CHEST WALL AND LOWER NECK: Chronic fractures of the right fourth through   eighth ribs.    ABDOMEN AND PELVIS:  LIVER: Within normal limits.  BILE DUCTS: Normal caliber.  GALLBLADDER: Within normal limits.  SPLEEN: Within normal limits.  PANCREAS: A couple pancreatic masses, probable metastatic disease, as   follows:  A mass in the pancreatic body measures 1.1 x 1 cm (2:76), stable.  A dominant heterogeneous mass in the distal body/tail measures 5.5 x 5 x   5.9 cm (2:66), increased from 4.8 x 3.9 x 4.9 cm on 10/7/2022. The mass   is inseparable from the stomach and the splenic artery and vein. New   since prior, a component of the mass versus adjacent lymphadenopathy   directly invades the adjacent splenic vein (2:65). The mass also abuts   the medial margin of the spleen. No main duct dilatation.  ADRENALS: Within normal limits.  KIDNEYS/URETERS: Mild right renal pelvic fullness versus parapelvic   cysts, stable.    BLADDER: Underdistended.  REPRODUCTIVE ORGANS: Normal uterus.    BOWEL: Colonic diverticulosis without acute diverticulitis. No bowel   obstruction. Appendix is not visualized. No evidence of inflammation in   the pericecal region.  PERITONEUM: No ascites.  VESSELS: Atherosclerotic changes. Direct invasion of tumor/tumor thrombus   in the splenic vein, new since prior.  RETROPERITONEUM/LYMPH NODES: Tumor/peripancreatic lymphadenopathy at the   distal pancreas body/tail, as above.  ABDOMINAL WALL: Within normal limits.  BONES: Degenerative changes. Chronic mild compression deformity of L5. A   mild inferior endplate compression deformity of L2, new since 10/7/2022,   age indeterminate.    IMPRESSION:  Since 10/7/2022, there is a mixed response with decrease in size of the   left hilar mass, but increase in size of the pancreatic mass. The   increased distal body/tail pancreatic mass, probable metastatic disease,   and/or adjacent lymphadenopathy now directly invades the splenic vein   with tumor thrombus. The left hilar mass directly invades the left   superior pulmonary vein with tumor thrombus, which was likely present on   prior as the vessel was near completely obscured by tumor at that time.  An L2 inferior endplate compression deformity is new since 10/7/2022, age   indeterminate. Correlate clinically for point tenderness.  --- End of Report ---    < end of copied text >

## 2023-08-01 NOTE — PROGRESS NOTE ADULT - SUBJECTIVE AND OBJECTIVE BOX
date of service: 08-01-23 @ 09:07  [facility]  REVIEW OF SYSTEMS:  CONSTITUTIONAL: No fever,  no  weight loss  ENT:  No  tinnitus,   no   vertigo  NECK: No pain or stiffness  RESPIRATORY: No cough, wheezing, chills or hemoptysis;    No Shortness of Breath  CARDIOVASCULAR: No chest pain, palpitations, dizziness  GASTROINTESTINAL: No abdominal or epigastric pain. No nausea, vomiting, or hematemesis; No diarrhea  No melena or hematochezia.  GENITOURINARY: No dysuria, frequency, hematuria, or incontinence  NEUROLOGICAL: No headaches  SKIN: No itching,  no   rash  LYMPH Nodes: No enlarged glands  ENDOCRINE: No heat or cold intolerance  MUSCULOSKELETAL: No joint pain or swelling  PSYCHIATRIC: No depression, anxiety  HEME/LYMPH: No easy bruising, or bleeding gums  ALLERGY AND IMMUNOLOGIC: No hives or eczema	    MEDICATIONS  (STANDING):  amLODIPine   Tablet 5 milliGRAM(s) Oral daily  heparin   Injectable 5000 Unit(s) SubCutaneous every 12 hours  melatonin 3 milliGRAM(s) Oral at bedtime  metoprolol succinate ER 25 milliGRAM(s) Oral daily  oxyCODONE    IR 5 milliGRAM(s) Oral daily  senna 2 Tablet(s) Oral at bedtime    MEDICATIONS  (PRN):  HYDROmorphone  Injectable 0.5 milliGRAM(s) IV Push every 8 hours PRN Severe Pain (7 - 10)  oxyCODONE    IR 5 milliGRAM(s) Oral three times a day PRN Moderate Pain (4 - 6)      Vital Signs Last 24 Hrs  T(C): 36.6 (01 Aug 2023 05:31), Max: 36.9 (31 Jul 2023 21:14)  T(F): 97.8 (01 Aug 2023 05:31), Max: 98.5 (31 Jul 2023 21:14)  HR: 64 (01 Aug 2023 05:31) (60 - 65)  BP: 120/66 (01 Aug 2023 05:31) (105/58 - 122/58)  BP(mean): --  RR: 18 (01 Aug 2023 05:31) (18 - 18)  SpO2: 95% (01 Aug 2023 05:31) (95% - 95%)    Parameters below as of 01 Aug 2023 05:31  Patient On (Oxygen Delivery Method): room air      CAPILLARY BLOOD GLUCOSE        I&O's Summary    31 Jul 2023 07:01  -  01 Aug 2023 07:00  --------------------------------------------------------  IN: 720 mL / OUT: 300 mL / NET: 420 mL          Appearance: Normal	  HEENT:   Normal oral mucosa, PERRL, EOMI	  Lymphatic: No lymphadenopathy  Cardiovascular: Normal S1 S2, No JVD  Respiratory: Lungs clear to auscultation	  Gastrointestinal:  Soft, Non-tender, + BS	  Skin: No rash, No ecchymoses	  Extremities:     LABS:                        10.0   5.88  )-----------( 130      ( 01 Aug 2023 07:04 )             31.2     08-01    141  |  99  |  21  ----------------------------<  105<H>  4.2   |  24  |  0.56    Ca    9.4      01 Aug 2023 07:01            Urinalysis Basic - ( 01 Aug 2023 07:01 )    Color: x / Appearance: x / SG: x / pH: x  Gluc: 105 mg/dL / Ketone: x  / Bili: x / Urobili: x   Blood: x / Protein: x / Nitrite: x   Leuk Esterase: x / RBC: x / WBC x   Sq Epi: x / Non Sq Epi: x / Bacteria: x                      Consultant(s) Notes Reviewed:      Care Discussed with Consultants/Other Providers:

## 2023-08-01 NOTE — PROGRESS NOTE ADULT - SUBJECTIVE AND OBJECTIVE BOX
HPI:  77yof      pmhx of Lung cancer, no active treatment, , HTN    h/p  lung cancer.  julieta  left  hilar mass.  s/p  chest  tube  2022,  for  pl  effusion/ seen by onc  dr arauz       here with persistent lower back pain. pt seen 2 days ago for acute L2 compression fx, seen by Neurosx, recommended pain control and MRI but pt did not want to stay in the hospital. reports taking tylenol with no relief. No numbness or tingling, no bowel or bladder issues. no weakness. No trauma or injuries.   now  returns   to  er  again with back pain    denies  fevers/ cp/sob/  abd  pain (26 Jul 2023 16:34)    PAST MEDICAL & SURGICAL HISTORY:  No significant past surgical history        Allergies    No Known Allergies    Intolerances      Social History:    Medications:  amLODIPine   Tablet 5 milliGRAM(s) Oral daily  heparin   Injectable 5000 Unit(s) SubCutaneous every 12 hours  HYDROmorphone  Injectable 0.5 milliGRAM(s) IV Push every 8 hours PRN Severe Pain (7 - 10)  melatonin 3 milliGRAM(s) Oral at bedtime  metoprolol succinate ER 25 milliGRAM(s) Oral daily  oxyCODONE    IR 5 milliGRAM(s) Oral daily  oxyCODONE    IR 5 milliGRAM(s) Oral three times a day PRN Moderate Pain (4 - 6)  senna 2 Tablet(s) Oral at bedtime    Labs:  CBC Full  -  ( 01 Aug 2023 07:04 )  WBC Count : 5.88 K/uL  RBC Count : 5.13 M/uL  Hemoglobin : 10.0 g/dL  Hematocrit : 31.2 %  Platelet Count - Automated : 130 K/uL  Mean Cell Volume : 60.8 fl  Mean Cell Hemoglobin : 19.5 pg  Mean Cell Hemoglobin Concentration : 32.1 gm/dL  Auto Neutrophil # : x  Auto Lymphocyte # : x  Auto Monocyte # : x  Auto Eosinophil # : x  Auto Basophil # : x  Auto Neutrophil % : x  Auto Lymphocyte % : x  Auto Monocyte % : x  Auto Eosinophil % : x  77yrs old female with pmhx of SCLC, HTN admitted 7/26/23 with back pain. Patient was seen 7/24/23 for acute L2 compression fx, seen by Neurosx, recommended pain control and MRI but pt did not want to stay in the hospital.  Patient  reported taking tylenol with no relief. No numbness or tingling, no bowel or bladder issues. no weakness. No trauma or injuries.Auto Basophil % : x    08-01    141  |  99  |  21  ----------------------------<  105<H>  4.2   |  24  |  0.56    Ca    9.4      01 Aug 2023 07:01        Radiology:             ROS:  Patient comfortable without distress  No SOB or chest pain  No palpitation  No abdominal pain, diarrhaea or constipation  No weakness of extremities, back pain as before  No skin changes or swelling of legs  Rest of the comprehensive ROS was negative  Vital Signs Last 24 Hrs  T(C): 36.6 (01 Aug 2023 05:31), Max: 36.9 (31 Jul 2023 21:14)  T(F): 97.8 (01 Aug 2023 05:31), Max: 98.5 (31 Jul 2023 21:14)  HR: 64 (01 Aug 2023 05:31) (62 - 65)  BP: 120/66 (01 Aug 2023 05:31) (105/58 - 120/66)  BP(mean): --  RR: 18 (01 Aug 2023 05:31) (18 - 18)  SpO2: 95% (01 Aug 2023 05:31) (95% - 95%)    Parameters below as of 01 Aug 2023 05:31  Patient On (Oxygen Delivery Method): room air        Physical exam:  Patient alert and oriented  No distress  CVS: S1, S2  Chest: bilateral breath sound without rales  Abdomen: soft, not tender, no organomegaly or masses  CNS: No focal neuro deficit  Musculoskeletal:  Normal range of motion  Skin: No rash    Assessment and Plan: 77yrs old female with pmhx of SCLC, HTN admitted 7/26/23 with back pain. Patient was seen 7/24/23 for acute L2 compression fx, seen by Neurosx, recommended pain control and MRI but pt did not want to stay in the hospital.  Patient  reported taking tylenol with no relief. No numbness or tingling, no bowel or bladder issues. no weakness. No trauma or injuries.    PAST MEDICAL & SURGICAL HISTORY:  No significant past surgical history        Allergies    No Known Allergies    Intolerances      Social History:    Medications:  amLODIPine   Tablet 5 milliGRAM(s) Oral daily  heparin   Injectable 5000 Unit(s) SubCutaneous every 12 hours  HYDROmorphone  Injectable 0.5 milliGRAM(s) IV Push every 8 hours PRN Severe Pain (7 - 10)  melatonin 3 milliGRAM(s) Oral at bedtime  metoprolol succinate ER 25 milliGRAM(s) Oral daily  oxyCODONE    IR 5 milliGRAM(s) Oral daily  oxyCODONE    IR 5 milliGRAM(s) Oral three times a day PRN Moderate Pain (4 - 6)  senna 2 Tablet(s) Oral at bedtime    Labs:  CBC Full  -  ( 01 Aug 2023 07:04 )  WBC Count : 5.88 K/uL  RBC Count : 5.13 M/uL  Hemoglobin : 10.0 g/dL  Hematocrit : 31.2 %  Platelet Count - Automated : 130 K/uL  Mean Cell Volume : 60.8 fl  Mean Cell Hemoglobin : 19.5 pg  Mean Cell Hemoglobin Concentration : 32.1 gm/dL  Auto Neutrophil # : x  Auto Lymphocyte # : x  Auto Monocyte # : x  Auto Eosinophil # : x  Auto Basophil # : x  Auto Neutrophil % : x  Auto Lymphocyte % : x  Auto Monocyte % : x  Auto Eosinophil % : x  77yrs old female with pmhx of SCLC, HTN admitted 7/26/23 with back pain. Patient was seen 7/24/23 for acute L2 compression fx, seen by Neurosx, recommended pain control and MRI but pt did not want to stay in the hospital.  Patient  reported taking tylenol with no relief. No numbness or tingling, no bowel or bladder issues. no weakness. No trauma or injuries.Auto Basophil % : x    08-01    141  |  99  |  21  ----------------------------<  105<H>  4.2   |  24  |  0.56    Ca    9.4      01 Aug 2023 07:01        Radiology:             ROS:  Patient comfortable without distress  No SOB or chest pain  No palpitation  No abdominal pain, diarrhaea or constipation  No weakness of extremities, back pain as before  No skin changes or swelling of legs  Rest of the comprehensive ROS was negative  Vital Signs Last 24 Hrs  T(C): 36.6 (01 Aug 2023 05:31), Max: 36.9 (31 Jul 2023 21:14)  T(F): 97.8 (01 Aug 2023 05:31), Max: 98.5 (31 Jul 2023 21:14)  HR: 64 (01 Aug 2023 05:31) (62 - 65)  BP: 120/66 (01 Aug 2023 05:31) (105/58 - 120/66)  BP(mean): --  RR: 18 (01 Aug 2023 05:31) (18 - 18)  SpO2: 95% (01 Aug 2023 05:31) (95% - 95%)    Parameters below as of 01 Aug 2023 05:31  Patient On (Oxygen Delivery Method): room air        Physical exam:  Patient alert and oriented  No distress  CVS: S1, S2  Chest: bilateral breath sound without rales  Abdomen: soft, not tender, no organomegaly or masses  CNS: No focal neuro deficit  Musculoskeletal:  Normal range of motion  Skin: No rash    Assessment and Plan:

## 2023-08-01 NOTE — PROGRESS NOTE ADULT - ASSESSMENT
Patient was admitted in Oct 2022 with SOB, needed left chest tube for pleural effusion and Bx of left lung mass by IR and was dx with extensive stage SCLC. She was treated with Atezolizumab, Carboplatin, Etoposide for 4 cycles starting Nov 29, 2023. PET CT March 9, 2023 showed decrease in lung mass and pancreatic tail mass. She was started on maintenance Atezolizumab in March 21, 2023.  She was admitted 7/26/2023 with back pain  < from: CT Abdomen and Pelvis w/ IV Cont (07.24.23 @ 12:50) >  < from: CT Chest w/ IV Cont (07.24.23 @ 12:50) >  IMPRESSION:  Since 10/7/2022, there is a mixed response with decrease in size of the   left hilar mass, but increase in size of the pancreatic mass. The   increased distal body/tail pancreatic mass, probable metastatic disease,   and/or adjacent lymphadenopathy now directly invades the splenic vein   with tumor thrombus. The left hilar mass directly invades the left   superior pulmonary vein with tumor thrombus, which was likely present on   but prognosis is poor  prior as the vessel was near completely obscured by tumor at that time.  Patient has tumor thrombi by imaging not needing AC  MRI spine noted< from: MR Thoracic Spine w/wo IV Cont (07.28.23 @ 14:39) >  IMPRESSION: Acute inferior endplate compression deformity of L2 appears   benign without bony retropulsion. Increased signal intensity on the   T1-weighted images suggesting increased fatty marrow which may be related   to previous radiation therapy. Dilatation of the central canal from T3   through the conus with focal dilatation at the T12-L1 level where there   appears to be a cystic nonenhancing mass displacing the cord posteriorly   which could represent a small intradural arachnoid cyst, cystic   schwannoma or metastasis in view of the history.    This does not given enough evidence of metastatic disease to suggest RT. Get MRI brain as that may help in planning management. NS opinino regarding dd or spine lesion and ? possibility of tissue dx specially if MRI brain OK.     CT likely represents disease progression for which Lubrinectin etc can be considered, but prognosis will remain poor, was discussed with patient's son

## 2023-08-02 NOTE — PROGRESS NOTE ADULT - SUBJECTIVE AND OBJECTIVE BOX
· Subjective and Objective:   77yrs old female with pmhx of SCLC, HTN admitted 7/26/23 with back pain. Patient was seen 7/24/23 for acute L2 compression fx, seen by Neurosx, recommended pain control and MRI but pt did not want to stay in the hospital.  Patient  reported taking tylenol with no relief. No numbness or tingling, no bowel or bladder issues. no weakness. No trauma or injuries.    PAST MEDICAL & SURGICAL HISTORY:  No significant past surgical history        Allergies    No Known Allergies    Intolerances      Social History:    Medications:  amLODIPine   Tablet 5 milliGRAM(s) Oral daily  heparin   Injectable 5000 Unit(s) SubCutaneous every 12 hours  HYDROmorphone  Injectable 0.5 milliGRAM(s) IV Push every 8 hours PRN Severe Pain (7 - 10)  LORazepam     Tablet 1 milliGRAM(s) Oral once  melatonin 3 milliGRAM(s) Oral at bedtime  metoprolol succinate ER 25 milliGRAM(s) Oral daily  oxyCODONE    IR 5 milliGRAM(s) Oral daily  oxyCODONE    IR 5 milliGRAM(s) Oral three times a day PRN Moderate Pain (4 - 6)  senna 2 Tablet(s) Oral at bedtime    Labs:  CBC Full  -  ( 02 Aug 2023 07:38 )  WBC Count : 5.82 K/uL  RBC Count : 5.27 M/uL  Hemoglobin : 10.1 g/dL  Hematocrit : 32.3 %  Platelet Count - Automated : 142 K/uL  Mean Cell Volume : 61.3 fl  Mean Cell Hemoglobin : 19.2 pg  Mean Cell Hemoglobin Concentration : 31.3 gm/dL  Auto Neutrophil # : x  Auto Lymphocyte # : x  Auto Monocyte # : x  Auto Eosinophil # : x  Auto Basophil # : x  Auto Neutrophil % : x  Auto Lymphocyte % : x  Auto Monocyte % : x  Auto Eosinophil % : x  Auto Basophil % : x    08-02    139  |  102  |  29<H>  ----------------------------<  104<H>  4.3   |  23  |  0.68    Ca    9.7      02 Aug 2023 07:39        Radiology:             ROS:  Patient comfortable without distress  No SOB or chest pain  No palpitation  No abdominal pain, diarrhaea or constipation  No weakness of extremities, back pain as before  No skin changes or swelling of legs  Rest of the comprehensive ROS was negative  Vital Signs Last 24 Hrs  T(C): 36.7 (02 Aug 2023 04:34), Max: 36.8 (01 Aug 2023 22:08)  T(F): 98 (02 Aug 2023 04:34), Max: 98.3 (01 Aug 2023 22:08)  HR: 65 (02 Aug 2023 04:34) (60 - 65)  BP: 133/73 (02 Aug 2023 04:34) (100/57 - 133/73)  BP(mean): --  RR: 18 (02 Aug 2023 04:34) (18 - 18)  SpO2: 98% (02 Aug 2023 04:34) (93% - 98%)    Parameters below as of 02 Aug 2023 04:34  Patient On (Oxygen Delivery Method): room air        Physical exam:  Patient alert and oriented  No distress  CVS: S1, S2   Chest: bilateral breath sound without rales  Abdomen: soft, not tender, no organomegaly or masses  CNS: No focal neuro deficit  Musculoskeletal:  Normal range of motion  Skin: No rash    Assessment and Plan:

## 2023-08-02 NOTE — CHART NOTE - NSCHARTNOTEFT_GEN_A_CORE
-There is a reasonable possibility that the dural based lesion could represent metastatic disease; however, given the location of the lesion it would require an open biopsy. Would recommend pursuing a pancreatic biopsy first, if the presence of dural disease would inform treatment or prognostication of pancreatic mass we would offer biopsy, possibly next week
Advanced Care Planning:  I have had goals of care discussion, advanced directive and code status with patient/family    and  in  coordinating   patient care.     all questions answered and expressed understanding of the plan.    pt  is  full  code
MR L spine reviewed w/ T12-L1 cystic nonenhancing mass displacing cord posteriorly w/ cord edema c/f arachnoid cyst vs schwannoma vs. metastasis of primary SCLC vs. poss. new primary tumor patient is being worked up for (pancreatic mass, pending bx).     Neurosurg will follow. Dr. Dumont to [facility] inpatient on Monday or Tuesday.

## 2023-08-02 NOTE — PROGRESS NOTE ADULT - ASSESSMENT
77yof      pmhx of Lung cancer, no active treatment, , HTN    h/o  lung cancer. / SCLC,   left  hilar mass.  s/p  chest  tube  2022,  for  pl  effusion/ seen by onc  dr arauz       here with persistent lower back pain. pt seen 2 days ago  in  er,  for acute L2 compression fx, seen by Neurosx, recommended   MRI but pt did not want to stay in the hospital.      denies  numbness or tingling, no bowel or bladder issues. no   motor weakness./ now  returns   to  er  again with back pain/  denies  fevers/ cp/sob/  abd  pain      back pain,  ct  with  comp fx..  was   seen  by  spine   in er   h/o  SCLC/  metastatic  disease  CT 7/24,,  left   hilar mass, c/c  right  rib   fx/ . tumor   thrombus  in left sup Pulm  V,', old,  and  tumor  thrombus   Splenic V, old  L5,  and new  l2  deformity   onc dr jose. /   on  maintenance ,   Alemtuzumab /  pd l inhibitor    no  a/c  needed,  per  heme  HTN   c/c  anemia on dvt  ppx/  s/q  heparin  defer  w/p  to  oncology/    MRI.  , comp  fx l2./ mass  t12  to  l1,  displacing cord/  cyst  vs  malignancy   pt  ambulating ,   no leg  weakness  oncologist    has discussed   prognosis/  rx  options   with son   mri  head.  pending           per   and son/  pt  is  full code    < from: R Thoracic Spine w/wo IV Cont (07.28.23 @ 14:39) >  IMPRESSION: Acute inferior endplate compression deformity of L2 appears   benign without bony retropulsion. Increased signal intensity on the   T1-weighted images suggesting increased fatty marrow which may be related   to previous radiation therapy. Dilatation of the central canal from T3   through the conus with focal dilatation at the T12-L1 level where there   appears to be a cystic nonenhancing mass displacing the cord posteriorly   which could represent a small intradural arachnoid cyst, cystic   schwannoma or metastasis in view of the history.    --- End of Report ---       rad< from: CT Abdomen and Pelvis w/ IV Cont (07.24.23 @ 12:50) >  IFINDINGS:  CHEST:  LUNGS AND LARGE AIRWAYS: Patent central airways. Marked decrease in size   of the left hilar/mediastinal conglomerate mass now measuring 3.5 cm in   craniocaudal dimension (601:52), previously 9 cm. Interval reexpansion of   the left mainstem bronchus and lobar bronchi (previously narrowed by the   confluent tumor). Mildly nodular thickening of the left fissure,   particularly at its peripheral location, contiguous with the hilar mass.   A punctate right lower lobe calcified granuloma.  PLEURA: Interval resolution of the left pleural effusion.  VESSELS: Direct extension of tumorinto the left superior pulmonary vein   with tumor thrombus, likely present on prior as the vessel was near   completely obscured by tumor at that time. Main pulmonary artery is   normal in caliber. Reexpansion of the left main pulmonary artery since  prior (previously narrowed by the confluent tumor). Atherosclerotic   change of a normal caliber thoracic aorta.  HEART: Heart size is normal. No pericardial effusion. Aortic valve and   coronary artery calcifications.  MEDIASTINUM AND ADE: Left hilar mass, as above. Additional   lymphadenopathy with a reference pretracheal node measuring 0.9 x 0.7 cm   (2:82), stable. A previously referenced node between the left common   carotid artery and the left subclavian artery is not well visualized.  CHEST WALL AND LOWER NECK: Chronic fractures of the right fourth through   eighth ribs.    ABDOMEN AND PELVIS:  LIVER: Within normal limits.  BILE DUCTS: Normal caliber.  GALLBLADDER: Within normal limits.  SPLEEN: Within normal limits.  PANCREAS: A couple pancreatic masses, probable metastatic disease, as   follows:  A mass in the pancreatic body measures 1.1 x 1 cm (2:76), stable.  A dominant heterogeneous mass in the distal body/tail measures 5.5 x 5 x   5.9 cm (2:66), increased from 4.8 x 3.9 x 4.9 cm on 10/7/2022. The mass   is inseparable from the stomach and the splenic artery and vein. New   since prior, a component of the mass versus adjacent lymphadenopathy   directly invades the adjacent splenic vein (2:65). The mass also abuts   the medial margin of the spleen. No main duct dilatation.  ADRENALS: Within normal limits.  KIDNEYS/URETERS: Mild right renal pelvic fullness versus parapelvic   cysts, stable.    BLADDER: Underdistended.  REPRODUCTIVE ORGANS: Normal uterus.    BOWEL: Colonic diverticulosis without acute diverticulitis. No bowel   obstruction. Appendix is not visualized. No evidence of inflammation in   the pericecal region.  PERITONEUM: No ascites.  VESSELS: Atherosclerotic changes. Direct invasion of tumor/tumor thrombus   in the splenic vein, new since prior.  RETROPERITONEUM/LYMPH NODES: Tumor/peripancreatic lymphadenopathy at the   distal pancreas body/tail, as above.  ABDOMINAL WALL: Within normal limits.  BONES: Degenerative changes. Chronic mild compression deformity of L5. A   mild inferior endplate compression deformity of L2, new since 10/7/2022,   age indeterminate.    IMPRESSION:  Since 10/7/2022, there is a mixed response with decrease in size of the   left hilar mass, but increase in size of the pancreatic mass. The   increased distal body/tail pancreatic mass, probable metastatic disease,   and/or adjacent lymphadenopathy now directly invades the splenic vein   with tumor thrombus. The left hilar mass directly invades the left   superior pulmonary vein with tumor thrombus, which was likely present on   prior as the vessel was near completely obscured by tumor at that time.  An L2 inferior endplate compression deformity is new since 10/7/2022, age   indeterminate. Correlate clinically for point tenderness.  --- End of Report ---    < end of copied text >

## 2023-08-02 NOTE — PROGRESS NOTE ADULT - ASSESSMENT
Patient was admitted in Oct 2022 with SOB, needed left chest tube for pleural effusion and Bx of left lung mass by IR and was dx with extensive stage SCLC. She was treated with Atezolizumab, Carboplatin, Etoposide for 4 cycles starting Nov 29, 2023. PET CT March 9, 2023 showed decrease in lung mass and pancreatic tail mass. She was started on maintenance Atezolizumab in March 21, 2023.  She was admitted 7/26/2023 with back pain  < from: CT Abdomen and Pelvis w/ IV Cont (07.24.23 @ 12:50) >  < from: CT Chest w/ IV Cont (07.24.23 @ 12:50) >  IMPRESSION:  Since 10/7/2022, there is a mixed response with decrease in size of the   left hilar mass, but increase in size of the pancreatic mass. The   increased distal body/tail pancreatic mass, probable metastatic disease,   and/or adjacent lymphadenopathy now directly invades the splenic vein   with tumor thrombus. The left hilar mass directly invades the left   superior pulmonary vein with tumor thrombus, which was likely present on   but prognosis is poor  prior as the vessel was near completely obscured by tumor at that time.  Patient has tumor thrombi by imaging not needing AC  MRI spine noted< from: MR Thoracic Spine w/wo IV Cont (07.28.23 @ 14:39) >  IMPRESSION: Acute inferior endplate compression deformity of L2 appears   benign without bony retropulsion. Increased signal intensity on the   T1-weighted images suggesting increased fatty marrow which may be related   to previous radiation therapy. Dilatation of the central canal from T3   through the conus with focal dilatation at the T12-L1 level where there   appears to be a cystic nonenhancing mass displacing the cord posteriorly   which could represent a small intradural arachnoid cyst, cystic   schwannoma or metastasis in view of the history.    This does not given enough evidence of metastatic disease to suggest RT. Get MRI brain as that may help in planning management. Can sues sedation if needed  Spoke to patient's son and NS resident and MD  Patient's son will speak to his mother.   Both mother and son are OK with MRI brain as if brain mets present will need RT.  He does not think his mother will want chemo. However he will discuss with her. If she will not want chemo then any invasive procedure will be held as will not be acting on it for treatment.  Patient's son and mother know the prognosis

## 2023-08-02 NOTE — PROGRESS NOTE ADULT - SUBJECTIVE AND OBJECTIVE BOX
date of service: 08-02-23 @ 08:25  afberile  REVIEW OF SYSTEMS:  CONSTITUTIONAL: No fever,  no  weight loss  ENT:  No  tinnitus,   no   vertigo  NECK: No pain or stiffness  RESPIRATORY: No cough, wheezing, chills or hemoptysis;    No Shortness of Breath  CARDIOVASCULAR: No chest pain, palpitations, dizziness  GASTROINTESTINAL: No abdominal or epigastric pain. No nausea, vomiting, or hematemesis; No diarrhea  No melena or hematochezia.  GENITOURINARY: No dysuria, frequency, hematuria, or incontinence  NEUROLOGICAL: No headaches  SKIN: No itching,  no   rash  LYMPH Nodes: No enlarged glands  ENDOCRINE: No heat or cold intolerance  MUSCULOSKELETAL: No joint pain or swelling  PSYCHIATRIC: No depression, anxiety  HEME/LYMPH: No easy bruising, or bleeding gums  ALLERGY AND IMMUNOLOGIC: No hives or eczema	    MEDICATIONS  (STANDING):  amLODIPine   Tablet 5 milliGRAM(s) Oral daily  heparin   Injectable 5000 Unit(s) SubCutaneous every 12 hours  LORazepam     Tablet 1 milliGRAM(s) Oral once  melatonin 3 milliGRAM(s) Oral at bedtime  metoprolol succinate ER 25 milliGRAM(s) Oral daily  oxyCODONE    IR 5 milliGRAM(s) Oral daily  senna 2 Tablet(s) Oral at bedtime    MEDICATIONS  (PRN):  HYDROmorphone  Injectable 0.5 milliGRAM(s) IV Push every 8 hours PRN Severe Pain (7 - 10)  oxyCODONE    IR 5 milliGRAM(s) Oral three times a day PRN Moderate Pain (4 - 6)      Vital Signs Last 24 Hrs  T(C): 36.7 (02 Aug 2023 04:34), Max: 36.8 (01 Aug 2023 22:08)  T(F): 98 (02 Aug 2023 04:34), Max: 98.3 (01 Aug 2023 22:08)  HR: 65 (02 Aug 2023 04:34) (60 - 65)  BP: 133/73 (02 Aug 2023 04:34) (100/57 - 133/73)  BP(mean): --  RR: 18 (02 Aug 2023 04:34) (18 - 18)  SpO2: 98% (02 Aug 2023 04:34) (93% - 98%)    Parameters below as of 02 Aug 2023 04:34  Patient On (Oxygen Delivery Method): room air      CAPILLARY BLOOD GLUCOSE        I&O's Summary    01 Aug 2023 07:01  -  02 Aug 2023 07:00  --------------------------------------------------------  IN: 720 mL / OUT: 300 mL / NET: 420 mL          Appearance: Normal	  HEENT:   Normal oral mucosa, PERRL, EOMI	  Lymphatic: No lymphadenopathy  Cardiovascular: Normal S1 S2, No JVD  Respiratory: Lungs clear to auscultation	  Gastrointestinal:  Soft, Non-tender, + BS	  Skin: No rash, No ecchymoses	  Extremities:     LABS:                        10.1   5.82  )-----------( 142      ( 02 Aug 2023 07:38 )             32.3     08-02    139  |  102  |  29<H>  ----------------------------<  104<H>  4.3   |  23  |  0.68    Ca    9.7      02 Aug 2023 07:39            Urinalysis Basic - ( 02 Aug 2023 07:39 )    Color: x / Appearance: x / SG: x / pH: x  Gluc: 104 mg/dL / Ketone: x  / Bili: x / Urobili: x   Blood: x / Protein: x / Nitrite: x   Leuk Esterase: x / RBC: x / WBC x   Sq Epi: x / Non Sq Epi: x / Bacteria: x                      Consultant(s) Notes Reviewed:      Care Discussed with Consultants/Other Providers:

## 2023-08-03 NOTE — PROGRESS NOTE ADULT - ASSESSMENT
Patient was admitted in Oct 2022 with SOB, needed left chest tube for pleural effusion and Bx of left lung mass by IR and was dx with extensive stage SCLC. She was treated with Atezolizumab, Carboplatin, Etoposide for 4 cycles starting Nov 29, 2023. PET CT March 9, 2023 showed decrease in lung mass and pancreatic tail mass. She was started on maintenance Atezolizumab in March 21, 2023.  She was admitted 7/26/2023 with back pain  < from: CT Abdomen and Pelvis w/ IV Cont (07.24.23 @ 12:50) >  < from: CT Chest w/ IV Cont (07.24.23 @ 12:50) >  IMPRESSION:  Since 10/7/2022, there is a mixed response with decrease in size of the   left hilar mass, but increase in size of the pancreatic mass. The   increased distal body/tail pancreatic mass, probable metastatic disease,   and/or adjacent lymphadenopathy now directly invades the splenic vein   with tumor thrombus. The left hilar mass directly invades the left   superior pulmonary vein with tumor thrombus, which was likely present on   but prognosis is poor  prior as the vessel was near completely obscured by tumor at that time.  Patient has tumor thrombi by imaging not needing AC  MRI spine noted< from: MR Thoracic Spine w/wo IV Cont (07.28.23 @ 14:39) >  IMPRESSION: Acute inferior endplate compression deformity of L2 appears   benign without bony retropulsion. Increased signal intensity on the   T1-weighted images suggesting increased fatty marrow which may be related   to previous radiation therapy. Dilatation of the central canal from T3   through the conus with focal dilatation at the T12-L1 level where there   appears to be a cystic nonenhancing mass displacing the cord posteriorly   which could represent a small intradural arachnoid cyst, cystic   schwannoma or metastasis in view of the history.    This does not given enough evidence of metastatic disease to suggest RT.   < from: MR Head w/wo IV Cont (08.02.23 @ 18:57) >  IMPRESSION: NO EVIDENCE OF INTRACRANIAL HEMORRHAGE, ACUTE TERRITORIAL   INFARCT OR AREA OF ABNORMAL ENHANCEMENT. NO EVIDENCE OF INTRACRANIAL   METASTATIC DISEASE.    < end of copied text >  I tried contacting patient's son to discusse MRI brain result.  If patient does not want chemo in future or invasive procedures management will be supportive and pain control.   She will continue follow up with me    Patient's son and mother know the prognosis        Patient was admitted in Oct 2022 with SOB, needed left chest tube for pleural effusion and Bx of left lung mass by IR and was dx with extensive stage SCLC. She was treated with Atezolizumab, Carboplatin, Etoposide for 4 cycles starting Nov 29, 2023. PET CT March 9, 2023 showed decrease in lung mass and pancreatic tail mass. She was started on maintenance Atezolizumab in March 21, 2023.  She was admitted 7/26/2023 with back pain  < from: CT Abdomen and Pelvis w/ IV Cont (07.24.23 @ 12:50) >  < from: CT Chest w/ IV Cont (07.24.23 @ 12:50) >  IMPRESSION:  Since 10/7/2022, there is a mixed response with decrease in size of the   left hilar mass, but increase in size of the pancreatic mass. The   increased distal body/tail pancreatic mass, probable metastatic disease,   and/or adjacent lymphadenopathy now directly invades the splenic vein   with tumor thrombus. The left hilar mass directly invades the left   superior pulmonary vein with tumor thrombus, which was likely present on   but prognosis is poor  prior as the vessel was near completely obscured by tumor at that time.  Patient has tumor thrombi by imaging not needing AC  MRI spine noted< from: MR Thoracic Spine w/wo IV Cont (07.28.23 @ 14:39) >  IMPRESSION: Acute inferior endplate compression deformity of L2 appears   benign without bony retropulsion. Increased signal intensity on the   T1-weighted images suggesting increased fatty marrow which may be related   to previous radiation therapy. Dilatation of the central canal from T3   through the conus with focal dilatation at the T12-L1 level where there   appears to be a cystic nonenhancing mass displacing the cord posteriorly   which could represent a small intradural arachnoid cyst, cystic   schwannoma or metastasis in view of the history.    This does not given enough evidence of metastatic disease to suggest RT.   < from: MR Head w/wo IV Cont (08.02.23 @ 18:57) >  IMPRESSION: NO EVIDENCE OF INTRACRANIAL HEMORRHAGE, ACUTE TERRITORIAL   INFARCT OR AREA OF ABNORMAL ENHANCEMENT. NO EVIDENCE OF INTRACRANIAL   METASTATIC DISEASE.    < end of copied text >  I contacted patient's son to discuss MRI brain result, no mets.  He confirmed that patient does not want chemo in future or invasive procedures. Thus  management will be supportive and pain control.   She will continue follow up with me  Prognosis is known to patient and her both sons

## 2023-08-03 NOTE — PROGRESS NOTE ADULT - ASSESSMENT
77yof      pmhx of Lung cancer, no active treatment, , HTN    h/o  lung cancer. / SCLC,   left  hilar mass.  s/p  chest  tube  2022,  for  pl  effusion/ seen by onc  dr arauz       here with persistent lower back pain. pt seen 2 days ago  in  er,  for acute L2 compression fx, seen by Neurosx, recommended   MRI but pt did not want to stay in the hospital.      denies  numbness or tingling, no bowel or bladder issues. no   motor weakness./ now  returns   to  er  again with back pain/  denies  fevers/ cp/sob/  abd  pain      back pain,  ct  with  comp fx..  was   seen  by  spine   in er   h/o  SCLC/  metastatic  disease  CT 7/24,,  left   hilar mass, c/c  right  rib   fx/ . tumor   thrombus  in left sup Pulm  V,', old,  and  tumor  thrombus   Splenic V, old  L5,  and new  l2  deformity   onc dr jose. /   on  maintenance ,   Alemtuzumab /  pd l inhibitor    no  a/c  needed,  per  heme  HTN   c/c  anemia on dvt  ppx/  s/q  heparin  defer  w/p  to  oncology/    MRI.  , comp  fx l2./ mass  t12  to  l1,  displacing cord/  cyst  vs  malignancy   pt  ambulating ,   no leg  weakness  oncologist    has discussed   prognosis/  rx  options   with son   mri  head.,  no mass    w/p  pe r oncologist/ on  pain meds           per   and son/  pt  is  full code    < from: R Thoracic Spine w/wo IV Cont (07.28.23 @ 14:39) >  IMPRESSION: Acute inferior endplate compression deformity of L2 appears   benign without bony retropulsion. Increased signal intensity on the   T1-weighted images suggesting increased fatty marrow which may be related   to previous radiation therapy. Dilatation of the central canal from T3   through the conus with focal dilatation at the T12-L1 level where there   appears to be a cystic nonenhancing mass displacing the cord posteriorly   which could represent a small intradural arachnoid cyst, cystic   schwannoma or metastasis in view of the history.    --- End of Report ---       rad< from: CT Abdomen and Pelvis w/ IV Cont (07.24.23 @ 12:50) >  IFINDINGS:  CHEST:  LUNGS AND LARGE AIRWAYS: Patent central airways. Marked decrease in size   of the left hilar/mediastinal conglomerate mass now measuring 3.5 cm in   craniocaudal dimension (601:52), previously 9 cm. Interval reexpansion of   the left mainstem bronchus and lobar bronchi (previously narrowed by the   confluent tumor). Mildly nodular thickening of the left fissure,   particularly at its peripheral location, contiguous with the hilar mass.   A punctate right lower lobe calcified granuloma.  PLEURA: Interval resolution of the left pleural effusion.  VESSELS: Direct extension of tumorinto the left superior pulmonary vein   with tumor thrombus, likely present on prior as the vessel was near   completely obscured by tumor at that time. Main pulmonary artery is   normal in caliber. Reexpansion of the left main pulmonary artery since  prior (previously narrowed by the confluent tumor). Atherosclerotic   change of a normal caliber thoracic aorta.  HEART: Heart size is normal. No pericardial effusion. Aortic valve and   coronary artery calcifications.  MEDIASTINUM AND ADE: Left hilar mass, as above. Additional   lymphadenopathy with a reference pretracheal node measuring 0.9 x 0.7 cm   (2:82), stable. A previously referenced node between the left common   carotid artery and the left subclavian artery is not well visualized.  CHEST WALL AND LOWER NECK: Chronic fractures of the right fourth through   eighth ribs.    ABDOMEN AND PELVIS:  LIVER: Within normal limits.  BILE DUCTS: Normal caliber.  GALLBLADDER: Within normal limits.  SPLEEN: Within normal limits.  PANCREAS: A couple pancreatic masses, probable metastatic disease, as   follows:  A mass in the pancreatic body measures 1.1 x 1 cm (2:76), stable.  A dominant heterogeneous mass in the distal body/tail measures 5.5 x 5 x   5.9 cm (2:66), increased from 4.8 x 3.9 x 4.9 cm on 10/7/2022. The mass   is inseparable from the stomach and the splenic artery and vein. New   since prior, a component of the mass versus adjacent lymphadenopathy   directly invades the adjacent splenic vein (2:65). The mass also abuts   the medial margin of the spleen. No main duct dilatation.  ADRENALS: Within normal limits.  KIDNEYS/URETERS: Mild right renal pelvic fullness versus parapelvic   cysts, stable.    BLADDER: Underdistended.  REPRODUCTIVE ORGANS: Normal uterus.    BOWEL: Colonic diverticulosis without acute diverticulitis. No bowel   obstruction. Appendix is not visualized. No evidence of inflammation in   the pericecal region.  PERITONEUM: No ascites.  VESSELS: Atherosclerotic changes. Direct invasion of tumor/tumor thrombus   in the splenic vein, new since prior.  RETROPERITONEUM/LYMPH NODES: Tumor/peripancreatic lymphadenopathy at the   distal pancreas body/tail, as above.  ABDOMINAL WALL: Within normal limits.  BONES: Degenerative changes. Chronic mild compression deformity of L5. A   mild inferior endplate compression deformity of L2, new since 10/7/2022,   age indeterminate.    IMPRESSION:  Since 10/7/2022, there is a mixed response with decrease in size of the   left hilar mass, but increase in size of the pancreatic mass. The   increased distal body/tail pancreatic mass, probable metastatic disease,   and/or adjacent lymphadenopathy now directly invades the splenic vein   with tumor thrombus. The left hilar mass directly invades the left   superior pulmonary vein with tumor thrombus, which was likely present on   prior as the vessel was near completely obscured by tumor at that time.  An L2 inferior endplate compression deformity is new since 10/7/2022, age   indeterminate. Correlate clinically for point tenderness.  --- End of Report ---    < end of copied text >

## 2023-08-03 NOTE — PROGRESS NOTE ADULT - SUBJECTIVE AND OBJECTIVE BOX
date of service: 08-03-23 @ 08:23  afberile  REVIEW OF SYSTEMS:  CONSTITUTIONAL: No fever,  no  weight loss  ENT:  No  tinnitus,   no   vertigo  NECK: No pain or stiffness  RESPIRATORY: No cough, wheezing, chills or hemoptysis;    No Shortness of Breath  CARDIOVASCULAR: No chest pain, palpitations, dizziness  GASTROINTESTINAL: No abdominal or epigastric pain. No nausea, vomiting, or hematemesis; No diarrhea  No melena or hematochezia.  GENITOURINARY: No dysuria, frequency, hematuria, or incontinence  NEUROLOGICAL: No headaches  SKIN: No itching,  no   rash  LYMPH Nodes: No enlarged glands  ENDOCRINE: No heat or cold intolerance  MUSCULOSKELETAL: No joint pain or swelling  PSYCHIATRIC: No depression, anxiety  HEME/LYMPH: No easy bruising, or bleeding gums  ALLERGY AND IMMUNOLOGIC: No hives or eczema	    MEDICATIONS  (STANDING):  amLODIPine   Tablet 5 milliGRAM(s) Oral daily  heparin   Injectable 5000 Unit(s) SubCutaneous every 12 hours  melatonin 3 milliGRAM(s) Oral at bedtime  metoprolol succinate ER 25 milliGRAM(s) Oral daily  oxyCODONE    IR 5 milliGRAM(s) Oral daily  senna 2 Tablet(s) Oral at bedtime    MEDICATIONS  (PRN):  oxyCODONE    IR 5 milliGRAM(s) Oral three times a day PRN Moderate Pain (4 - 6)      Vital Signs Last 24 Hrs  T(C): 36.6 (03 Aug 2023 04:37), Max: 36.9 (02 Aug 2023 12:05)  T(F): 97.9 (03 Aug 2023 04:37), Max: 98.4 (02 Aug 2023 12:05)  HR: 68 (03 Aug 2023 04:37) (58 - 72)  BP: 133/66 (03 Aug 2023 04:37) (118/61 - 133/66)  BP(mean): --  RR: 18 (03 Aug 2023 04:37) (18 - 18)  SpO2: 98% (03 Aug 2023 04:37) (97% - 98%)    Parameters below as of 03 Aug 2023 04:37  Patient On (Oxygen Delivery Method): room air      CAPILLARY BLOOD GLUCOSE        I&O's Summary    02 Aug 2023 07:01  -  03 Aug 2023 07:00  --------------------------------------------------------  IN: 720 mL / OUT: 0 mL / NET: 720 mL          Appearance: Normal	  HEENT:   Normal oral mucosa, PERRL, EOMI	  Lymphatic: No lymphadenopathy  Cardiovascular: Normal S1 S2, No JVD  Respiratory: Lungs clear to auscultation	  Gastrointestinal:  Soft, Non-tender, + BS	  Skin: No rash, No ecchymoses	  Extremities:     LABS:                        10.1   5.82  )-----------( 142      ( 02 Aug 2023 07:38 )             32.3     08-02    139  |  102  |  29<H>  ----------------------------<  104<H>  4.3   |  23  |  0.68    Ca    9.7      02 Aug 2023 07:39            Urinalysis Basic - ( 02 Aug 2023 07:39 )    Color: x / Appearance: x / SG: x / pH: x  Gluc: 104 mg/dL / Ketone: x  / Bili: x / Urobili: x   Blood: x / Protein: x / Nitrite: x   Leuk Esterase: x / RBC: x / WBC x   Sq Epi: x / Non Sq Epi: x / Bacteria: x                      Consultant(s) Notes Reviewed:      Care Discussed with Consultants/Other Providers:

## 2023-08-03 NOTE — PROGRESS NOTE ADULT - SUBJECTIVE AND OBJECTIVE BOX
77yrs old female with pmhx of SCLC, HTN admitted 7/26/23 with back pain. Patient was seen 7/24/23 for acute L2 compression fx, seen by Neurosx, recommended pain control and MRI but pt did not want to stay in the hospital.  Patient  reported taking tylenol with no relief. No numbness or tingling, no bowel or bladder issues. no weakness. No trauma or injuries.  PAST MEDICAL & SURGICAL HISTORY:  No significant past surgical history        Allergies    No Known Allergies    Intolerances      Social History:    Medications:  amLODIPine   Tablet 5 milliGRAM(s) Oral daily  heparin   Injectable 5000 Unit(s) SubCutaneous every 12 hours  melatonin 3 milliGRAM(s) Oral at bedtime  metoprolol succinate ER 25 milliGRAM(s) Oral daily  oxyCODONE    IR 5 milliGRAM(s) Oral three times a day PRN Moderate Pain (4 - 6)  oxyCODONE    IR 5 milliGRAM(s) Oral daily  senna 2 Tablet(s) Oral at bedtime    Labs:  CBC Full  -  ( 02 Aug 2023 07:38 )  WBC Count : 5.82 K/uL  RBC Count : 5.27 M/uL  Hemoglobin : 10.1 g/dL  Hematocrit : 32.3 %  Platelet Count - Automated : 142 K/uL  Mean Cell Volume : 61.3 fl  Mean Cell Hemoglobin : 19.2 pg  Mean Cell Hemoglobin Concentration : 31.3 gm/dL  Auto Neutrophil # : x  Auto Lymphocyte # : x  Auto Monocyte # : x  Auto Eosinophil # : x  Auto Basophil # : x  Auto Neutrophil % : x  Auto Lymphocyte % : x  Auto Monocyte % : x  Auto Eosinophil % : x  Auto Basophil % : x    08-02    139  |  102  |  29<H>  ----------------------------<  104<H>  4.3   |  23  |  0.68    Ca    9.7      02 Aug 2023 07:39        Radiology:             ROS:  Patient comfortable without distress  No SOB or chest pain  No palpitation  No abdominal pain, diarrhaea or constipation  No weakness of extremities, back pain as before  No skin changes or swelling of legs  Rest of the comprehensive ROS was negative  Vital Signs Last 24 Hrs  T(C): 36.8 (03 Aug 2023 12:13), Max: 36.8 (03 Aug 2023 12:13)  T(F): 98.2 (03 Aug 2023 12:13), Max: 98.2 (03 Aug 2023 12:13)  HR: 66 (03 Aug 2023 12:13) (66 - 72)  BP: 104/64 (03 Aug 2023 12:13) (104/64 - 133/66)  BP(mean): --  RR: 18 (03 Aug 2023 12:13) (18 - 18)  SpO2: 95% (03 Aug 2023 12:13) (95% - 98%)    Parameters below as of 03 Aug 2023 12:13  Patient On (Oxygen Delivery Method): room air        Physical exam:  Patient alert and oriented  No distress  CVS: S1, S2  Chest: bilateral breath sound without rales  Abdomen: soft, not tender, no organomegaly or masses  CNS: No focal neuro deficit  Musculoskeletal:  Normal range of motion  Skin: No rash    Assessment and Plan:

## 2023-08-04 NOTE — DISCHARGE NOTE PROVIDER - NSDCCPCAREPLAN_GEN_ALL_CORE_FT
PRINCIPAL DISCHARGE DIAGNOSIS  Diagnosis: Compression fracture of lumbosacral spine  Assessment and Plan of Treatment: persistent lower back pain. pt seen 2 days ago  in  er,  for acute L2 compression fx, seen by Neurosx, recommended   MRI but pt did not want to stay in the hospital.      denies  numbness or tingling, no bowel or bladder issues. no   motor weakness./ now  returns   to  er  again with back pain/  denies  fevers/ cp/sob/  abd  pain    back pain,  ct  with  comp fx..  was   seen  by  spine   in er   h/o  SCLC/  metastatic  disease  CT 7/24,,  left   hilar mass, c/c  right  rib   fx/ . tumor   thrombus  in left sup Pulm  V,', old,  and  tumor  thrombus   Splenic V, old  L5,  and new  l2  deformity   onc dr butler. /   on  maintenance ,   Alemtuzumab /  pd l inhibitor    no  a/c  needed,  per  heme      SECONDARY DISCHARGE DIAGNOSES  Diagnosis: Lung cancer  Assessment and Plan of Treatment: Lung cancer, no active treatment, , HTN    h/o  lung cancer. / SCLC,   left  hilar mass.  s/p  chest  tube  2022,  for  pl  effusion/ seen by onc  dr arauz    Diagnosis: Anemia  Assessment and Plan of Treatment: anemia on dvt  ppx/  s/q  heparin  defer  w/p  to  oncology/ and [facility] see MD out - pt (Dr. Butler)

## 2023-08-04 NOTE — DISCHARGE NOTE NURSING/CASE MANAGEMENT/SOCIAL WORK - NSDCPEFALRISK_GEN_ALL_CORE
For information on Fall & Injury Prevention, visit: [email or web address] OR  [email or web address] OR  https://www.cdc.gov/steadi/patient.html

## 2023-08-04 NOTE — PROGRESS NOTE ADULT - SUBJECTIVE AND OBJECTIVE BOX
date of service: 08-04-23 @ 08:27  afberile  REVIEW OF SYSTEMS:  CONSTITUTIONAL: No fever,  no  weight loss  ENT:  No  tinnitus,   no   vertigo  NECK: No pain or stiffness  RESPIRATORY: No cough, wheezing, chills or hemoptysis;    No Shortness of Breath  CARDIOVASCULAR: No chest pain, palpitations, dizziness  GASTROINTESTINAL: No abdominal or epigastric pain. No nausea, vomiting, or hematemesis; No diarrhea  No melena or hematochezia.  GENITOURINARY: No dysuria, frequency, hematuria, or incontinence  NEUROLOGICAL: No headaches  SKIN: No itching,  no   rash  LYMPH Nodes: No enlarged glands  ENDOCRINE: No heat or cold intolerance  MUSCULOSKELETAL: No joint pain or swelling  PSYCHIATRIC: No depression, anxiety  HEME/LYMPH: No easy bruising, or bleeding gums  ALLERGY AND IMMUNOLOGIC: No hives or eczema	    MEDICATIONS  (STANDING):  amLODIPine   Tablet 5 milliGRAM(s) Oral daily  heparin   Injectable 5000 Unit(s) SubCutaneous every 12 hours  melatonin 3 milliGRAM(s) Oral at bedtime  metoprolol succinate ER 25 milliGRAM(s) Oral daily  oxyCODONE    IR 5 milliGRAM(s) Oral daily  senna 2 Tablet(s) Oral at bedtime    MEDICATIONS  (PRN):  oxyCODONE    IR 5 milliGRAM(s) Oral daily PRN Moderate Pain (4 - 6)      Vital Signs Last 24 Hrs  T(C): 36.6 (04 Aug 2023 04:16), Max: 37 (03 Aug 2023 21:07)  T(F): 97.9 (04 Aug 2023 04:16), Max: 98.6 (03 Aug 2023 21:07)  HR: 64 (04 Aug 2023 04:16) (64 - 66)  BP: 138/65 (04 Aug 2023 04:16) (104/57 - 138/65)  BP(mean): --  RR: 18 (04 Aug 2023 04:16) (18 - 18)  SpO2: 96% (04 Aug 2023 04:16) (95% - 96%)    Parameters below as of 04 Aug 2023 04:16  Patient On (Oxygen Delivery Method): room air      CAPILLARY BLOOD GLUCOSE        I&O's Summary    03 Aug 2023 07:01  -  04 Aug 2023 07:00  --------------------------------------------------------  IN: 720 mL / OUT: 0 mL / NET: 720 mL          Appearance: Normal	  HEENT:   Normal oral mucosa, PERRL, EOMI	  Lymphatic: No lymphadenopathy  Cardiovascular: Normal S1 S2, No JVD  Respiratory: Lungs clear to auscultation	  Gastrointestinal:  Soft, Non-tender, + BS	  Skin: No rash, No ecchymoses	  Extremities:     LABS:                                  Consultant(s) Notes Reviewed:      Care Discussed with Consultants/Other Providers:

## 2023-08-04 NOTE — DISCHARGE NOTE PROVIDER - PROVIDER TOKENS
PROVIDER:[TOKEN:[[id number]]],PROVIDER:[TOKEN:[[id number]]] Curettage Text: The wound bed was treated with curettage after the biopsy was performed.

## 2023-08-04 NOTE — PROGRESS NOTE ADULT - ASSESSMENT
77yof      pmhx of Lung cancer, no active treatment, , HTN    h/o  lung cancer. / SCLC,   left  hilar mass.  s/p  chest  tube  2022,  for  pl  effusion/ seen by onc  dr arauz       here with persistent lower back pain. pt seen 2 days ago  in  er,  for acute L2 compression fx, seen by [facility], recommended   MRI but pt did not want to stay in the hospital.      denies  numbness or tingling, no bowel or bladder issues. no   motor weakness./ now  returns   to  er  again with back pain/  denies  fevers/ cp/sob/  abd  pain      back pain,  ct  with  comp fx..  was   seen  by  spine   in er   h/o  SCLC/  metastatic  disease  CT 7/24,,  left   hilar mass, c/c  right  rib   fx/ . tumor   thrombus  in left sup Pulm  V,', old,  and  tumor  thrombus   Splenic V, old  L5,  and new  l2  deformity   onc dr jose. /   on  maintenance ,   Alemtuzumab /  pd l inhibitor    no  a/c  needed,  per  heme  HTN   c/c  anemia on dvt  ppx/  s/q  heparin  defer  w/p  to  oncology/    MRI.  , comp  fx l2./ mass  t12  to  l1,  displacing cord/  cyst  vs  malignancy   pt  ambulating ,   no leg  weakness  oncologist    has discussed   prognosis/  rx  options   with son   mri  head.,  no mass    w/p  pe r oncologist/ on  pain meds  p rd  rohri,  no  further   intervention    plan,   d/c  home/  f/p  with oncology           per   and son/  pt  is  full code    < from: R Thoracic Spine w/wo IV Cont (07.28.23 @ 14:39) >  IMPRESSION: Acute inferior endplate compression deformity of L2 appears   benign without bony retropulsion. Increased signal intensity on the   T1-weighted images suggesting increased fatty marrow which may be related   to previous radiation therapy. Dilatation of the central canal from T3   through the conus with focal dilatation at the T12-L1 level where there   appears to be a cystic nonenhancing mass displacing the cord posteriorly   which could represent a small intradural arachnoid cyst, cystic   schwannoma or metastasis in view of the history.    --- End of Report ---       rad< from: CT Abdomen and Pelvis w/ IV Cont (07.24.23 @ 12:50) >  IFINDINGS:  CHEST:  LUNGS AND LARGE AIRWAYS: Patent central airways. Marked decrease in size   of the left hilar/mediastinal conglomerate mass now measuring 3.5 cm in   craniocaudal dimension (601:52), previously 9 cm. Interval reexpansion of   the left mainstem bronchus and lobar bronchi (previously narrowed by the   confluent tumor). Mildly nodular thickening of the left fissure,   particularly at its peripheral location, contiguous with the hilar mass.   A punctate right lower lobe calcified granuloma.  PLEURA: Interval resolution of the left pleural effusion.  VESSELS: Direct extension of tumorinto the left superior pulmonary vein   with tumor thrombus, likely present on prior as the vessel was near   completely obscured by tumor at that time. Main pulmonary artery is   normal in caliber. Reexpansion of the left main pulmonary artery since  prior (previously narrowed by the confluent tumor). Atherosclerotic   change of a normal caliber thoracic aorta.  HEART: Heart size is normal. No pericardial effusion. Aortic valve and   coronary artery calcifications.  MEDIASTINUM AND ADE: Left hilar mass, as above. Additional   lymphadenopathy with a reference pretracheal node measuring 0.9 x 0.7 cm   (2:82), stable. A previously referenced node between the left common   carotid artery and the left subclavian artery is not well visualized.  CHEST WALL AND LOWER NECK: Chronic fractures of the right fourth through   eighth ribs.    ABDOMEN AND PELVIS:  LIVER: Within normal limits.  BILE DUCTS: Normal caliber.  GALLBLADDER: Within normal limits.  SPLEEN: Within normal limits.  PANCREAS: A couple pancreatic masses, probable metastatic disease, as   follows:  A mass in the pancreatic body measures 1.1 x 1 cm (2:76), stable.  A dominant heterogeneous mass in the distal body/tail measures 5.5 x 5 x   5.9 cm (2:66), increased from 4.8 x 3.9 x 4.9 cm on 10/7/2022. The mass   is inseparable from the stomach and the splenic artery and vein. New   since prior, a component of the mass versus adjacent lymphadenopathy   directly invades the adjacent splenic vein (2:65). The mass also abuts   the medial margin of the spleen. No main duct dilatation.  ADRENALS: Within normal limits.  KIDNEYS/URETERS: Mild right renal pelvic fullness versus parapelvic   cysts, stable.    BLADDER: Underdistended.  REPRODUCTIVE ORGANS: Normal uterus.    BOWEL: Colonic diverticulosis without acute diverticulitis. No bowel   obstruction. Appendix is not visualized. No evidence of inflammation in   the pericecal region.  PERITONEUM: No ascites.  VESSELS: Atherosclerotic changes. Direct invasion of tumor/tumor thrombus   in the splenic vein, new since prior.  RETROPERITONEUM/LYMPH NODES: Tumor/peripancreatic lymphadenopathy at the   distal pancreas body/tail, as above.  ABDOMINAL WALL: Within normal limits.  BONES: Degenerative changes. Chronic mild compression deformity of L5. A   mild inferior endplate compression deformity of L2, new since 10/7/2022,   age indeterminate.    IMPRESSION:  Since 10/7/2022, there is a mixed response with decrease in size of the   left hilar mass, but increase in size of the pancreatic mass. The   increased distal body/tail pancreatic mass, probable metastatic disease,   and/or adjacent lymphadenopathy now directly invades the splenic vein   with tumor thrombus. The left hilar mass directly invades the left   superior pulmonary vein with tumor thrombus, which was likely present on   prior as the vessel was near completely obscured by tumor at that time.  An L2 inferior endplate compression deformity is new since 10/7/2022, age   indeterminate. Correlate clinically for point tenderness.  --- End of Report ---    < end of copied text >

## 2023-08-04 NOTE — DISCHARGE NOTE PROVIDER - CARE PROVIDER_API CALL
John Constantino  Cardiovascular Disease  [address] [place] [id number]  Phone: [phone]  Fax: [phone]  Follow Up Time:     Char Butler  Hematology  [address] [place] [address] [address]  [place] [address]  Phone: [phone]  Fax: [phone]  Follow Up Time:

## 2023-08-04 NOTE — DISCHARGE NOTE PROVIDER - HOSPITAL COURSE
77yof      pmhx of Lung cancer, no active treatment, , HTN    h/o  lung cancer. / SCLC,   left  hilar mass.  s/p  chest  tube  2022,  for  pl  effusion/ seen by onc  dr arauz       here with persistent lower back pain. pt seen 2 days ago  in  er,  for acute L2 compression fx, seen by [facility], recommended   MRI but pt did not want to stay in the hospital.      denies  numbness or tingling, no bowel or bladder issues. no   motor weakness./ now  returns   to  er  again with back pain/  denies  fevers/ cp/sob/  abd  pain      back pain,  ct  with  comp fx..  was   seen  by  spine   in er   h/o  SCLC/  metastatic  disease  CT 7/24,,  left   hilar mass, c/c  right  rib   fx/ . tumor   thrombus  in left sup Pulm  V,', old,  and  tumor  thrombus   Splenic V, old  L5,  and new  l2  deformity   onc dr jose. /   on  maintenance ,   Alemtuzumab /  pd l inhibitor    no  a/c  needed,  per  heme  HTN   c/c  anemia on dvt  ppx/  s/q  heparin  defer  w/p  to  oncology/    MRI.  , comp  fx l2./ mass  t12  to  l1,  displacing cord/  cyst  vs  malignancy   pt  ambulating ,   no leg  weakness  oncologist    has discussed   prognosis/  rx  options   with son   mri  head.,  no mass    w/p  pe r oncologist/ on  pain meds  p hung juares,  no  further   intervention   plan,   d/c  home/  f/p  with oncology  per   and son/  pt  is  full code  On 8/4 pt. dc'd home  - d/w Dr. Martel    77yof pmhx of Lung cancer, no active treatment, , HTN  h/o  lung cancer. / SCLC,   left  hilar mass.  s/p  chest  tube  2022,  for  pl  effusion/ seen by onc  dr arauz     here with persistent lower back pain. pt seen 2 days ago  in  er,  for acute L2 compression fx, seen by [facility], recommended   MRI but pt did not want to stay in the hospital.    denies  numbness or tingling, no bowel or bladder issues. no   motor weakness./ now  returns   to  er  again with back pain/  denies  fevers/ cp/sob/  abd  pain    back pain,  ct  with  comp fx..  was   seen  by  spine   in er  h/o  SCLC/  metastatic  disease  CT 7/24,,  left   hilar mass, c/c  right  rib   fx/ . tumor   thrombus  in left sup Pulm  V,', old,  and  tumor  thrombus   Splenic V, old  L5,  and new  l2  deformity  onc dr jose. /   on  maintenance ,   Alemtuzumab /  pd l inhibitor  no  a/c  needed,  per  heme  HTN  c/c  anemia on dvt  ppx/  s/q  heparin  defer  w/p  to  oncology/    MRI.  , comp  fx l2./ mass  t12  to  l1,  displacing cord/  cyst  vs  malignancy   pt  ambulating ,   no leg  weakness  oncologist    has discussed   prognosis/  rx  options   with son   mri  head.,  no mass    w/p  pe r oncologist/ on  pain meds  p hung juares,  no  further   intervention   plan,   d/c  home/  f/p  with oncology  per   and son/  pt  is  full code  On 8/4 pt. dc'd home  - d/w Dr. Martel    77yof pmhx of Lung cancer, no active treatment, , HTN  h/o  lung cancer. / SCLC,   left  hilar mass.  s/p  chest  tube  2022,  for  pl  effusion/ seen by onc  dr arauz     here with persistent lower back pain. pt seen 2 days ago  in  er,  for acute L2 compression fx, seen by Neuro[facility], recommended   MRI but pt did not want to stay in the hospital.    denies  numbness or tingling, no bowel or bladder issues. no   motor weakness./ now  returns   to  er  again with back pain/  denies  fevers/ cp/sob/  abd  pain    back pain,  ct  with  comp fx..  was   seen  by  spine   in er  h/o  SCLC/  metastatic  disease  CT 7/24,,  left   hilar mass, c/c  right  rib   fx/ . tumor   thrombus  in left sup Pulm  V,', old,  and  tumor  thrombus   Splenic V, old  L5,  and new  l2  deformity  onc dr joes. /   on  maintenance ,   Alemtuzumab /  pd l inhibitor  no  a/c  needed,  per  heme  HTN  c/c  anemia on dvt  ppx/  s/q  heparin  defer  w/p  to  oncology/    MRI.  , comp  fx l2./ mass  t12  to  l1,  displacing cord/  cyst  vs  malignancy   pt  ambulating ,   no leg  weakness  oncologist    has discussed   prognosis/  rx  options   with son   mri  head.,  no mass    w/p  pe r oncologist/ on  pain meds  p hung juares,  no  further   intervention   plan,   d/c  home/  f/p  with oncology  per   and son/  pt  is  full code  On 8/4 pt. dc'd home  - d/w Dr. Martel

## 2023-08-04 NOTE — DISCHARGE NOTE NURSING/CASE MANAGEMENT/SOCIAL WORK - PATIENT PORTAL LINK FT
You can access the FollowMyHealth Patient Portal offered by [facility] by registering at the following website: [email or web address]. By joining [facility]’s FollowMyHealth portal, you will also be able to view your health information using other applications (apps) compatible with our system.

## 2023-08-04 NOTE — DISCHARGE NOTE PROVIDER - NSDCMRMEDTOKEN_GEN_ALL_CORE_FT
amLODIPine 5 mg oral tablet: 1 tab(s) orally once a day  metoprolol succinate 25 mg oral tablet, extended release: 1 tab(s) orally once a day   amLODIPine 5 mg oral tablet: 1 tab(s) orally once a day  metoprolol succinate 25 mg oral tablet, extended release: 1 tab(s) orally once a day  oxyCODONE 5 mg oral tablet: 1 tab(s) orally once a day As directed MDD: 1

## 2023-08-04 NOTE — PROGRESS NOTE ADULT - PROVIDER SPECIALTY LIST ADULT
Heme/Onc
Heme/Onc
Internal Medicine
Heme/Onc
Heme/Onc
Internal Medicine
Internal Medicine
Heme/Onc
Internal Medicine

## 2023-08-10 NOTE — ED ADULT TRIAGE NOTE - BIRTH SEX
Protocol For Nb Uva: The patient received NB UVA. Protocol For Photochemotherapy: Mineral Oil And Nbuvb: The patient received Photochemotherapy: Mineral Oil and NBUVB (mineral oil applied to all lesions prior to phototherapy). Consent: Written consent obtained.  The risks were reviewed with the patient including but not limited to: burn, pigmentary changes, pain, blistering, scabbing, redness, increased risk of skin cancers, and the remote possibility of scarring. Protocol For Photochemotherapy For Severe Photoresponsive Dermatoses: Petrolatum And Broad Band Uvb: The patient received Photochemotherapyfor severe photoresponsive dermatoses: Petrolatum and Broad Band UVB requiring at least 4 to 8 hours of care under direct physician supervision. Protocol For Protocol For Photochemotherapy For Severe Photoresponsive Dermatoses: Bath Puva: The patient received Photochemotherapy for severe photoresponsive dermatoses: Bath PUVA requiring at least 4 to 8 hours of care under direct physician supervision. Protocol For Photochemotherapy: Tar And Broad Band Uvb (Goeckerman Treatment): The patient received Photochemotherapy: Tar and Broad Band UVB (Goeckerman treatment). Protocol For Nbuvb: The patient received NBUVB. Detail Level: Generalized Protocol For Photochemotherapy: Petrolatum And Broad Band Uvb: The patient received Photochemotherapy: Petrolatum and Broad Band UVB. Protocol For Photochemotherapy: Triamcinolone Ointment And Nbuvb: The patient received Photochemotherapy: Triamcinolone and NBUVB (triamcinolone ointment applied to all lesions prior to phototherapy). Render Post-Care In The Note: no Protocol For Photochemotherapy: Tar And Nbuvb (Goeckerman Treatment): The patient received Photochemotherapy: Tar and NBUVB (Goeckerman treatment). Protocol: NBUVB Protocol For Puva: The patient received PUVA. Protocol For Uva: The patient received UVA. Protocol For Uva1: The patient received UVA1. Total Treatment Time: 3.19 Protocol For Photochemotherapy: Petrolatum And Nbuvb: The patient received Photochemotherapy: Petrolatum and NBUVB (petrolatum applied to all lesions prior to phototherapy). Protocol For Photochemotherapy For Severe Photoresponsive Dermatoses: Tar And Nbuvb (Goeckerman Treatment): The patient received Photochemotherapy for severe photoresponsive dermatoses: Tar and NBUVB (Goeckerman treatment) requiring at least 4 to 8 hours of care under direct physician supervision. Treatment Number: 281 Protocol For Bath Puva: The patient received Bath PUVA. Protocol For Photochemotherapy For Severe Photoresponsive Dermatoses: Puva: The patient received Photochemotherapy for severe photoresponsive dermatoses: PUVA requiring at least 4 to 8 hours of care under direct physician supervision. Protocol For Photochemotherapy: Mineral Oil And Broad Band Uvb: The patient received Photochemotherapy: Mineral Oil and Broad Band UVB. Protocol For Broad Band Uvb: The patient received Broad Band UVB. Skin Type: II Protocol For Photochemotherapy For Severe Photoresponsive Dermatoses: Petrolatum And Nbuvb: The patient received Photochemotherapy for severe photoresponsive dermatoses: Petrolatum and NBUVB requiring at least 4 to 8 hours of care under direct physician supervision. Protocol For Photochemotherapy: Baby Oil And Nbuvb: The patient received Photochemotherapy: Baby Oil and NBUVB (baby oil applied to all lesions prior to phototherapy). Protocol For Photochemotherapy For Severe Photoresponsive Dermatoses: Tar And Broad Band Uvb (Goeckerman Treatment): The patient received Photochemotherapy for severe photoresponsive dermatoses: Tar and Broad Band UVB (Goeckerman treatment) requiring at least 4 to 8 hours of care under direct physician supervision. Post-Care Instructions: I reviewed with the patient in detail post-care instructions. Patient is to wear sun protection. Patients may expect sunburn like redness, discomfort and scabbing. Female

## 2023-11-28 NOTE — H&P ADULT - NSHPPOASURGSITEINCISION_GEN_ALL_CORE
Physical Therapy Treatment Note     Date: 11/10/2023  Name: Abi Croft  Clinic Number: [id number]  Age: 15 m.o.    Physician: Tania Yadav MD  Physician Orders: Evaluate and Treat  Medical Diagnosis: gross motor delay    Therapy Diagnosis:   Encounter Diagnosis   Name Primary?    Gross motor delay Yes        Evaluation Date: 6/7/2023  Plan of Care Certification Period: 11/2/2023 to 2/2/2024      Insurance Authorization Period Expiration: 5/30/2024  Visit # / Visits authorized: 7 / 20  Time In: 1035  Time Out: 1100  Total Billable Time: 25 minutes    Precautions: Standard    Subjective     Mother brought Abi to therapy and remained in waiting room during treatment session.  Caregiver reported patient still mostly cruising at home but will not stand for long periods.     Pain: Child too young to understand and rate pain levels. No pain behaviors noted during session.    Objective     Abi participated in the following:     Abi received therapeutic exercises to develop strength, ROM, posture, and core stabilization for 0 minutes including:     Abi participated in dynamic functional therapeutic activities to improve functional performance for 15  minutes, including:  - standing at support surface with bilateral play   - quadruped creeping   - cruising bilaterally     Abi participated in neuromuscular re-education to improve balance, coordination, and kinesthetic sense and proprioception for 10 minutes:   - static stance with assistance at hips away from support surface  - static stance attempted with posterior support from wall       Home Exercises and Education Provided     Education provided:   Caregiver was educated on patient's current functional status, progress, and home exercise program. Caregiver verbalized understanding.    Home Exercises Provided: Yes. Exercises were reviewed and caregiver was able to demonstrate them prior to the end of the session and displayed good   understanding of the home exercise program provided.     Assessment     Session focused on: Standing balance, Coordination, Posture, Promotion of adaptive responses to environmental demands, Gross motor stimulation, Parent education/training, Initiation/progression of home exercise program , and Facilitation of transitions . Patient tolerated session much better this visit with fewer frustrated outbursts and much improved participation without caregiver present in room. Patient was able to demonstrate cruising at support surface several steps and some bilateral play at support surface today. Unable to progress to forward ambulation with or without support, or static stance without support.     Abi is progressing well towards her goals and there are no updates to goals at this time. Patient will continue to benefit from skilled outpatient physical therapy to address the deficits listed in the problem list on initial evaluation, provide patient/family education and to maximize patient's level of independence in the home and community environment.     Patient prognosis is Excellent.   Anticipated barriers to physical therapy: none at this time  Patient's spiritual, cultural and educational needs considered and agreeable to plan of care and goals.    Goals:     Goal: Patient/family will verbalize understanding of HEP and report ongoing adherence to recommendations.   Date Initiated: 6/7/2023  Duration: Ongoing through discharge   Status: Progressing  Comments: Patient caregiver verbalized good understanding today       Goal: Patient will be able to take 4 alternating steps forward with bilateral hand held assistance in 2/3 trials.   Date Initiated: 6/7/2023  Duration: 1 months  Status: Progressing  Comments: patient not yet progressed to forward ambulation with or without assistance      Goal: Patient will demonstrate pull to stand at support surface through half kneeling bilaterally in 2/3 trials.   Date  Initiated: 8/11/2023  Duration: 1 months  Status: Goal Met  Comments: Patient able to demonstrate in all trials today.       Goal: Patient will demonstrate sideways cruising at support surface 4 ft in either direction in 2/3 trials.   Date Initiated: 8/11/2023  Duration: 3 months  Status: Progressing  Comments: Patient able to demonstrate 2 feet of cruising today        Plan     Continue working on decreased support in standing and forward ambulation with or without support.     Roz Prabhakar, PT, DPT  11/10/2023      no

## 2024-01-01 ENCOUNTER — EMERGENCY (EMERGENCY)
Facility: HOSPITAL | Age: 79
LOS: 1 days | End: 2024-01-01
Attending: EMERGENCY MEDICINE
Payer: MEDICARE

## 2024-01-01 VITALS — OXYGEN SATURATION: 100 % | TEMPERATURE: 98 F | HEART RATE: 72 BPM

## 2024-01-01 LAB
ALBUMIN SERPL ELPH-MCNC: 2.5 G/DL — LOW (ref 3.3–5)
ALP SERPL-CCNC: 984 U/L — HIGH (ref 40–120)
ALT FLD-CCNC: 216 U/L — HIGH (ref 10–45)
ANION GAP SERPL CALC-SCNC: 19 MMOL/L — HIGH (ref 5–17)
ANISOCYTOSIS BLD QL: SIGNIFICANT CHANGE UP
AST SERPL-CCNC: 432 U/L — HIGH (ref 10–40)
BASOPHILS # BLD AUTO: 0 K/UL — SIGNIFICANT CHANGE UP (ref 0–0.2)
BASOPHILS NFR BLD AUTO: 0 % — SIGNIFICANT CHANGE UP (ref 0–2)
BILIRUB SERPL-MCNC: 21.3 MG/DL — HIGH (ref 0.2–1.2)
BUN SERPL-MCNC: 56 MG/DL — HIGH (ref 7–23)
CALCIUM SERPL-MCNC: 8.6 MG/DL — SIGNIFICANT CHANGE UP (ref 8.4–10.5)
CHLORIDE SERPL-SCNC: 98 MMOL/L — SIGNIFICANT CHANGE UP (ref 96–108)
CO2 SERPL-SCNC: 19 MMOL/L — LOW (ref 22–31)
CREAT SERPL-MCNC: 0.99 MG/DL — SIGNIFICANT CHANGE UP (ref 0.5–1.3)
DACRYOCYTES BLD QL SMEAR: SLIGHT — SIGNIFICANT CHANGE UP
EGFR: 58 ML/MIN/1.73M2 — LOW
EOSINOPHIL # BLD AUTO: 0 K/UL — SIGNIFICANT CHANGE UP (ref 0–0.5)
EOSINOPHIL NFR BLD AUTO: 0 % — SIGNIFICANT CHANGE UP (ref 0–6)
GLUCOSE SERPL-MCNC: 91 MG/DL — SIGNIFICANT CHANGE UP (ref 70–99)
HCT VFR BLD CALC: 26.9 % — LOW (ref 34.5–45)
HGB BLD-MCNC: 9 G/DL — LOW (ref 11.5–15.5)
HYPOCHROMIA BLD QL: SIGNIFICANT CHANGE UP
LYMPHOCYTES # BLD AUTO: 0.67 K/UL — LOW (ref 1–3.3)
LYMPHOCYTES # BLD AUTO: 2.6 % — LOW (ref 13–44)
MACROCYTES BLD QL: SLIGHT — SIGNIFICANT CHANGE UP
MANUAL SMEAR VERIFICATION: SIGNIFICANT CHANGE UP
MCHC RBC-ENTMCNC: 19.1 PG — LOW (ref 27–34)
MCHC RBC-ENTMCNC: 33.5 GM/DL — SIGNIFICANT CHANGE UP (ref 32–36)
MCV RBC AUTO: 57 FL — LOW (ref 80–100)
MICROCYTES BLD QL: SIGNIFICANT CHANGE UP
MONOCYTES # BLD AUTO: 0.88 K/UL — SIGNIFICANT CHANGE UP (ref 0–0.9)
MONOCYTES NFR BLD AUTO: 3.4 % — SIGNIFICANT CHANGE UP (ref 2–14)
NEUTROPHILS # BLD AUTO: 24.21 K/UL — HIGH (ref 1.8–7.4)
NEUTROPHILS NFR BLD AUTO: 94 % — HIGH (ref 43–77)
NRBC # BLD: 6 /100 WBCS — HIGH (ref 0–0)
OVALOCYTES BLD QL SMEAR: SLIGHT — SIGNIFICANT CHANGE UP
PLAT MORPH BLD: NORMAL — SIGNIFICANT CHANGE UP
PLATELET # BLD AUTO: 191 K/UL — SIGNIFICANT CHANGE UP (ref 150–400)
POLYCHROMASIA BLD QL SMEAR: SLIGHT — SIGNIFICANT CHANGE UP
POTASSIUM SERPL-MCNC: 6 MMOL/L — HIGH (ref 3.5–5.3)
POTASSIUM SERPL-SCNC: 6 MMOL/L — HIGH (ref 3.5–5.3)
PROT SERPL-MCNC: 6.3 G/DL — SIGNIFICANT CHANGE UP (ref 6–8.3)
RBC # BLD: 4.72 M/UL — SIGNIFICANT CHANGE UP (ref 3.8–5.2)
RBC # FLD: 23.7 % — HIGH (ref 10.3–14.5)
RBC BLD AUTO: ABNORMAL
SCHISTOCYTES BLD QL AUTO: SLIGHT — SIGNIFICANT CHANGE UP
SODIUM SERPL-SCNC: 136 MMOL/L — SIGNIFICANT CHANGE UP (ref 135–145)
TARGETS BLD QL SMEAR: SIGNIFICANT CHANGE UP
WBC # BLD: 25.75 K/UL — HIGH (ref 3.8–10.5)
WBC # FLD AUTO: 25.75 K/UL — HIGH (ref 3.8–10.5)

## 2024-01-01 PROCEDURE — 36415 COLL VENOUS BLD VENIPUNCTURE: CPT

## 2024-01-01 PROCEDURE — 92950 HEART/LUNG RESUSCITATION CPR: CPT

## 2024-01-01 PROCEDURE — 85025 COMPLETE CBC W/AUTO DIFF WBC: CPT

## 2024-01-01 PROCEDURE — 85014 HEMATOCRIT: CPT

## 2024-01-01 PROCEDURE — 82435 ASSAY OF BLOOD CHLORIDE: CPT

## 2024-01-01 PROCEDURE — 82947 ASSAY GLUCOSE BLOOD QUANT: CPT

## 2024-01-01 PROCEDURE — 99285 EMERGENCY DEPT VISIT HI MDM: CPT | Mod: 25

## 2024-01-01 PROCEDURE — 94660 CPAP INITIATION&MGMT: CPT

## 2024-01-01 PROCEDURE — 80053 COMPREHEN METABOLIC PANEL: CPT

## 2024-01-01 PROCEDURE — 82803 BLOOD GASES ANY COMBINATION: CPT

## 2024-01-01 PROCEDURE — 99291 CRITICAL CARE FIRST HOUR: CPT | Mod: 25

## 2024-01-01 PROCEDURE — 82330 ASSAY OF CALCIUM: CPT

## 2024-01-01 PROCEDURE — 84132 ASSAY OF SERUM POTASSIUM: CPT

## 2024-01-01 PROCEDURE — 85018 HEMOGLOBIN: CPT

## 2024-01-01 PROCEDURE — 84295 ASSAY OF SERUM SODIUM: CPT

## 2024-01-01 PROCEDURE — 83605 ASSAY OF LACTIC ACID: CPT

## 2024-03-13 NOTE — ED ADULT NURSE REASSESSMENT NOTE - NS ED NURSE REASSESS COMMENT FT1
Live on representative Guy was spoken to in regards to pt's expiration on 1740. Live on Confirmation number is [id number]

## 2024-03-13 NOTE — ED PROVIDER NOTE - CLINICAL SUMMARY MEDICAL DECISION MAKING FREE TEXT BOX
78 Y F presenting with severe illness, hypotensive and hypoxic, likely 2/2 severe metabolic abnormality, concern for hyperkalemia, hypercalcemia, sepsis, pulmonary embolism.

## 2024-03-13 NOTE — ED ADULT NURSE NOTE - OBJECTIVE STATEMENT
77 y/o  F with PMHx lung cancer and HTN BIBEMS from home. per EMS pt son reports pt has been deteriorating for past 4 days, nonverbal , increased altered mental status and decreased po intake, pt AOx3 and ambulatory at baseline, pt had hospice consultation tomorrow. per EMS son made call due to low oxygen saturation, pt found in 91% on room air by EMS and placed on nonrebreather. EMS reports pt systolic in 80's unable to get an access. on arrival pt awake only responsive to painful stimuli and generalized jaundice . acces to left hand 22G was obtained and 1L LR initiated. pt heart rate began to drop to low 40's and BP was 74/39 at 16:31. pt transferred from room green 27 to trauma B, 1 AMP bicarb given @16:48, pt became pea/asystole, Code ACLS initiated @16:49. refer to code ACLS sheet

## 2024-03-13 NOTE — ED PROVIDER NOTE - PROGRESS NOTE DETAILS
Abraham Nelson MD:  Patient initially responded to aggressive resuscitation with x2 epinephrine, x2 g calcium chloride, x1 sodium bicarbonate, d50 with x3 rounds of CPR. extensive discussion with family, agreeing to DNR, DNI per discussion with Dr. Pyle, Patient then developed persistent hypoxemia, bradycardia and developed CA at 1740.

## 2024-03-13 NOTE — ED ADULT NURSE REASSESSMENT NOTE - NS ED NURSE REASSESS COMMENT FT1
@17:00 ROSC was obtained, vitals stable. @ 17:16 provider in contact with pt son who expressed desire for pt to become DNI/DNR. @17:40 pt  pronounced by DR Pyle.

## 2024-03-13 NOTE — ED PROVIDER NOTE - OBJECTIVE STATEMENT
78 Y F p/w new ams, hypotension, decreased po intake, hypoxemia in setting of chronic lung cancer not on chemotherapy. Presenting to ED hypotensive, hypoxemic with minimally interactive exam, unable to respond to ROS, not responding to painful stimuli.

## 2024-03-13 NOTE — ED PROVIDER NOTE - ATTENDING CONTRIBUTION TO CARE
HX: pt with h/o lung cancer, not on treatment, presents via EMS with "gurgling" noted by family.  Pt unresponsive and unable to give history.  History initially from EMS and reviewed of last discharge summary. EMS reports pt with 4 days of inability to tolerate PO, weakness, lethargic.    PE: ill appearing, jaundiced, dry mm, respiratory distress with retractions, tachypnea initally, coarse bs b/l, ab soft, nt, no peripheral edema. Not responsive to painful stimuli.    MDM: respiratory failure, advanced oncologic process, poor prognosis.  check cbc r/o anemia or leukocytosis, check bmp to r/o metabolic derangement and lyte imbalance, vbg, cxr, ekg, fluids, consider hyperkalemia, consider sepsis, consider PE, pneumonia. HX: pt with h/o lung cancer, not on treatment, presents via EMS with "gurgling" noted by family.  Pt unresponsive and unable to give history.  History initially from EMS and reviewed of last discharge summary. EMS reports pt with 4 days of inability to tolerate PO, weakness, lethargic.    PE: ill appearing, jaundiced, dry mm, respiratory distress with retractions, tachypnea initally, coarse bs b/l, ab soft, nt, no peripheral edema. Not responsive to painful stimuli.    MDM: respiratory failure, advanced oncologic process, poor prognosis.  check cbc r/o anemia or leukocytosis, check bmp to r/o metabolic derangement and lyte imbalance, vbg, cxr, ekg, fluids, consider hyperkalemia, consider sepsis, consider PE, pneumonia.    Progress Note 20:00: summary of time-critical and very dynamic events during this patient's course.  Pt after arrival noted with downtrending HR and worsening mental status. Pt moved to critical room bay in preparation for possible cardiac event. No family available, EMS reported full code, unable to call family yet as shortly afterwards, pt went into cardiac arrest, CPR initiated. Pt given epi *1, sodium bicarb and calcium for possible hyperkalemia. Pt did not initially respond to interventions with no pulse for several minutes, then with temporary ROSC.  Family (2 sons one who's the HCP), were notified on phone, both of initial CPR and possible loss of life, then ROSC which may be temporary.  Discussion of GOC and treatment plans with shared decision making to allow for natural death, avoid heroics including no further CPR, no intubation, continue medical therapies.  Pt placed on vasopressor, fluids, and oxygen therapy, attempted bipap and 15L oxygen separately without relief of respiratory failure and shortly  thereafter. ME contacted, no case, family notified and expressed appreciation and deferred hospital visit).

## 2024-03-13 NOTE — ED ADULT NURSE REASSESSMENT NOTE - NS ED NURSE REASSESS COMMENT FT1
received report from break coverage CARMEN Leong. pt currently mentating and receiving pressors for hypotension. @1730 pt became asysole with no pulse present. DNR/DNI. time of death was called @1740 by MD Pyle at bedside.
